# Patient Record
Sex: FEMALE | Race: WHITE | NOT HISPANIC OR LATINO | URBAN - METROPOLITAN AREA
[De-identification: names, ages, dates, MRNs, and addresses within clinical notes are randomized per-mention and may not be internally consistent; named-entity substitution may affect disease eponyms.]

---

## 2017-11-23 ENCOUNTER — EMERGENCY (EMERGENCY)
Facility: HOSPITAL | Age: 62
LOS: 0 days | Discharge: HOME | End: 2017-11-23
Attending: EMERGENCY MEDICINE | Admitting: EMERGENCY MEDICINE
Payer: COMMERCIAL

## 2017-11-23 VITALS
TEMPERATURE: 98 F | OXYGEN SATURATION: 100 % | HEART RATE: 60 BPM | DIASTOLIC BLOOD PRESSURE: 50 MMHG | RESPIRATION RATE: 16 BRPM | SYSTOLIC BLOOD PRESSURE: 99 MMHG

## 2017-11-23 VITALS — WEIGHT: 160.06 LBS | HEIGHT: 67 IN

## 2017-11-23 DIAGNOSIS — R10.31 RIGHT LOWER QUADRANT PAIN: ICD-10-CM

## 2017-11-23 DIAGNOSIS — M54.9 DORSALGIA, UNSPECIFIED: ICD-10-CM

## 2017-11-23 DIAGNOSIS — R11.2 NAUSEA WITH VOMITING, UNSPECIFIED: ICD-10-CM

## 2017-11-23 LAB
ALBUMIN SERPL ELPH-MCNC: 3 G/DL — LOW (ref 3.3–5)
ALP SERPL-CCNC: 208 U/L — HIGH (ref 40–120)
ALT FLD-CCNC: 47 U/L — SIGNIFICANT CHANGE UP (ref 12–78)
ANION GAP SERPL CALC-SCNC: 7 MMOL/L — SIGNIFICANT CHANGE UP (ref 5–17)
APPEARANCE UR: CLEAR — SIGNIFICANT CHANGE UP
APTT BLD: 32.8 SEC — SIGNIFICANT CHANGE UP (ref 27.5–37.4)
AST SERPL-CCNC: 74 U/L — HIGH (ref 15–37)
BACTERIA # UR AUTO: (no result)
BASOPHILS # BLD AUTO: 0.1 K/UL — SIGNIFICANT CHANGE UP (ref 0–0.2)
BASOPHILS NFR BLD AUTO: 1.2 % — SIGNIFICANT CHANGE UP (ref 0–2)
BILIRUB SERPL-MCNC: 1.7 MG/DL — HIGH (ref 0.2–1.2)
BILIRUB UR-MCNC: (no result)
BLD GP AB SCN SERPL QL: SIGNIFICANT CHANGE UP
BUN SERPL-MCNC: 14 MG/DL — SIGNIFICANT CHANGE UP (ref 7–23)
CALCIUM SERPL-MCNC: 8.5 MG/DL — SIGNIFICANT CHANGE UP (ref 8.5–10.1)
CHLORIDE SERPL-SCNC: 105 MMOL/L — SIGNIFICANT CHANGE UP (ref 96–108)
CO2 SERPL-SCNC: 27 MMOL/L — SIGNIFICANT CHANGE UP (ref 22–31)
COLOR SPEC: (no result)
COMMENT - URINE: SIGNIFICANT CHANGE UP
CREAT SERPL-MCNC: 0.8 MG/DL — SIGNIFICANT CHANGE UP (ref 0.5–1.3)
DIFF PNL FLD: (no result)
EOSINOPHIL # BLD AUTO: 0.2 K/UL — SIGNIFICANT CHANGE UP (ref 0–0.5)
EOSINOPHIL NFR BLD AUTO: 3.9 % — SIGNIFICANT CHANGE UP (ref 0–6)
EPI CELLS # UR: SIGNIFICANT CHANGE UP
GLUCOSE SERPL-MCNC: 79 MG/DL — SIGNIFICANT CHANGE UP (ref 70–99)
GLUCOSE UR QL: NEGATIVE MG/DL — SIGNIFICANT CHANGE UP
HCT VFR BLD CALC: 41.2 % — SIGNIFICANT CHANGE UP (ref 34.5–45)
HGB BLD-MCNC: 14.1 G/DL — SIGNIFICANT CHANGE UP (ref 11.5–15.5)
INR BLD: 1.2 RATIO — HIGH (ref 0.88–1.16)
KETONES UR-MCNC: (no result)
LACTATE SERPL-SCNC: 0.6 MMOL/L — LOW (ref 0.7–2)
LEUKOCYTE ESTERASE UR-ACNC: (no result)
LIDOCAIN IGE QN: 182 U/L — SIGNIFICANT CHANGE UP (ref 73–393)
LYMPHOCYTES # BLD AUTO: 1.4 K/UL — SIGNIFICANT CHANGE UP (ref 1–3.3)
LYMPHOCYTES # BLD AUTO: 23.3 % — SIGNIFICANT CHANGE UP (ref 13–44)
MCHC RBC-ENTMCNC: 34.2 GM/DL — SIGNIFICANT CHANGE UP (ref 32–36)
MCHC RBC-ENTMCNC: 34.8 PG — HIGH (ref 27–34)
MCV RBC AUTO: 101.8 FL — HIGH (ref 80–100)
MONOCYTES # BLD AUTO: 0.7 K/UL — SIGNIFICANT CHANGE UP (ref 0–0.9)
MONOCYTES NFR BLD AUTO: 11.9 % — SIGNIFICANT CHANGE UP (ref 2–14)
NEUTROPHILS # BLD AUTO: 3.5 K/UL — SIGNIFICANT CHANGE UP (ref 1.8–7.4)
NEUTROPHILS NFR BLD AUTO: 59.7 % — SIGNIFICANT CHANGE UP (ref 43–77)
NITRITE UR-MCNC: NEGATIVE — SIGNIFICANT CHANGE UP
PH UR: 7 — SIGNIFICANT CHANGE UP (ref 5–8)
PLATELET # BLD AUTO: 108 K/UL — LOW (ref 150–400)
POTASSIUM SERPL-MCNC: 3.7 MMOL/L — SIGNIFICANT CHANGE UP (ref 3.5–5.3)
POTASSIUM SERPL-SCNC: 3.7 MMOL/L — SIGNIFICANT CHANGE UP (ref 3.5–5.3)
PROT SERPL-MCNC: 7.3 GM/DL — SIGNIFICANT CHANGE UP (ref 6–8.3)
PROT UR-MCNC: 30 MG/DL
PROTHROM AB SERPL-ACNC: 13 SEC — HIGH (ref 9.8–12.7)
RBC # BLD: 4.05 M/UL — SIGNIFICANT CHANGE UP (ref 3.8–5.2)
RBC # FLD: 13.7 % — SIGNIFICANT CHANGE UP (ref 10.3–14.5)
RBC CASTS # UR COMP ASSIST: (no result) /HPF (ref 0–4)
SODIUM SERPL-SCNC: 139 MMOL/L — SIGNIFICANT CHANGE UP (ref 135–145)
SP GR SPEC: 1.01 — SIGNIFICANT CHANGE UP (ref 1.01–1.02)
TROPONIN I SERPL-MCNC: <0.015 NG/ML — SIGNIFICANT CHANGE UP (ref 0.01–0.04)
TYPE + AB SCN PNL BLD: SIGNIFICANT CHANGE UP
UROBILINOGEN FLD QL: 8 MG/DL
WBC # BLD: 5.8 K/UL — SIGNIFICANT CHANGE UP (ref 3.8–10.5)
WBC # FLD AUTO: 5.8 K/UL — SIGNIFICANT CHANGE UP (ref 3.8–10.5)
WBC UR QL: SIGNIFICANT CHANGE UP

## 2017-11-23 PROCEDURE — 76856 US EXAM PELVIC COMPLETE: CPT | Mod: 26

## 2017-11-23 PROCEDURE — 93010 ELECTROCARDIOGRAM REPORT: CPT

## 2017-11-23 PROCEDURE — 99284 EMERGENCY DEPT VISIT MOD MDM: CPT

## 2017-11-23 PROCEDURE — 74177 CT ABD & PELVIS W/CONTRAST: CPT | Mod: 26

## 2017-11-23 RX ORDER — MORPHINE SULFATE 50 MG/1
4 CAPSULE, EXTENDED RELEASE ORAL ONCE
Qty: 0 | Refills: 0 | Status: DISCONTINUED | OUTPATIENT
Start: 2017-11-23 | End: 2017-11-23

## 2017-11-23 RX ORDER — ONDANSETRON 8 MG/1
1 TABLET, FILM COATED ORAL
Qty: 28 | Refills: 0
Start: 2017-11-23 | End: 2017-11-30

## 2017-11-23 RX ORDER — ONDANSETRON 8 MG/1
4 TABLET, FILM COATED ORAL ONCE
Qty: 0 | Refills: 0 | Status: COMPLETED | OUTPATIENT
Start: 2017-11-23 | End: 2017-11-23

## 2017-11-23 RX ORDER — SODIUM CHLORIDE 9 MG/ML
1000 INJECTION INTRAMUSCULAR; INTRAVENOUS; SUBCUTANEOUS ONCE
Qty: 0 | Refills: 0 | Status: COMPLETED | OUTPATIENT
Start: 2017-11-23 | End: 2017-11-23

## 2017-11-23 RX ADMIN — ONDANSETRON 4 MILLIGRAM(S): 8 TABLET, FILM COATED ORAL at 17:35

## 2017-11-23 RX ADMIN — MORPHINE SULFATE 4 MILLIGRAM(S): 50 CAPSULE, EXTENDED RELEASE ORAL at 18:11

## 2017-11-23 RX ADMIN — MORPHINE SULFATE 4 MILLIGRAM(S): 50 CAPSULE, EXTENDED RELEASE ORAL at 17:35

## 2017-11-23 RX ADMIN — SODIUM CHLORIDE 2000 MILLILITER(S): 9 INJECTION INTRAMUSCULAR; INTRAVENOUS; SUBCUTANEOUS at 19:30

## 2017-11-23 NOTE — ED ADULT NURSE NOTE - OBJECTIVE STATEMENT
Pt reports RLQ pain x hours that started after eating hors d'oeuvres.  Pt reports nausea and dry heaving at home and one episode of small emesis en route.  Pt denies bloody emesis, dysuria, or change in bm.  Denies hematuria or bloody stool. Denies f/c.

## 2017-11-23 NOTE — ED PROVIDER NOTE - MEDICAL DECISION MAKING DETAILS
Labs WNL, with exception of LFTs likely 2/2 hx of cirrhosis.  CT abdomen WNL with exception of cirrhosis.  Pelvic US WNL.  Pt feeling better on reexam.  Okay for d/c home.  Rx Zofran.  F/u with PCP.  Return precautions given.

## 2017-11-23 NOTE — ED STATDOCS - PROGRESS NOTE DETAILS
Elvira SP: 61 y/o female with hx of HTN, HLD, inguinal and umbilical hernia presents to the ED c/o abd pain starting today. Pt has hernias, however doesn't usually experience pain. Pt felt pain after eating two shrimps for lunch. + NV +Back pain +Chills +lightheaded. Denies SP, SOB. Pt's surgeon chose not to operate on her hernias due to her cirrhosis. Dr. Elmore - surgeon. Will send pt to main ED for further evaluation.

## 2017-11-23 NOTE — ED PROVIDER NOTE - GASTROINTESTINAL, MLM
(+) periumbilical, suprapubic, RLQ tenderness to palpating, no rebound, no guarding.  Small reducible hernia.

## 2017-11-23 NOTE — ED ADULT NURSE REASSESSMENT NOTE - NS ED NURSE REASSESS COMMENT FT1
Pt A&O x4, recvd pt from RN Dottie in bed with rails up. PT hypotensive and asymptomatic,  Dr. Nathan made aware and IV fluids administered as ordered, cardiac monitor in place. safety maintained, will monitor.

## 2017-11-25 LAB
CULTURE RESULTS: SIGNIFICANT CHANGE UP
SPECIMEN SOURCE: SIGNIFICANT CHANGE UP

## 2018-06-04 ENCOUNTER — LABORATORY RESULT (OUTPATIENT)
Age: 63
End: 2018-06-04

## 2018-06-04 ENCOUNTER — APPOINTMENT (OUTPATIENT)
Dept: GASTROENTEROLOGY | Facility: CLINIC | Age: 63
End: 2018-06-04
Payer: COMMERCIAL

## 2018-06-04 VITALS
TEMPERATURE: 98.1 F | BODY MASS INDEX: 27.32 KG/M2 | WEIGHT: 170 LBS | OXYGEN SATURATION: 97 % | SYSTOLIC BLOOD PRESSURE: 110 MMHG | HEART RATE: 55 BPM | DIASTOLIC BLOOD PRESSURE: 70 MMHG | HEIGHT: 66 IN

## 2018-06-04 DIAGNOSIS — B18.2 CHRONIC VIRAL HEPATITIS C: ICD-10-CM

## 2018-06-04 DIAGNOSIS — R13.10 DYSPHAGIA, UNSPECIFIED: ICD-10-CM

## 2018-06-04 PROCEDURE — 99203 OFFICE O/P NEW LOW 30 MIN: CPT

## 2018-06-04 RX ORDER — PROPRANOLOL HYDROCHLORIDE 60 MG/1
60 TABLET ORAL
Refills: 0 | Status: ACTIVE | COMMUNITY

## 2018-06-06 ENCOUNTER — OTHER (OUTPATIENT)
Age: 63
End: 2018-06-06

## 2018-06-06 LAB
AFP-TM SERPL-MCNC: 4.5 NG/ML
ALBUMIN SERPL ELPH-MCNC: 3.4 G/DL
ALP BLD-CCNC: 131 U/L
ALT SERPL-CCNC: 25 U/L
ANION GAP SERPL CALC-SCNC: 10 MMOL/L
AST SERPL-CCNC: 51 U/L
BASOPHILS # BLD AUTO: 0.03 K/UL
BASOPHILS NFR BLD AUTO: 0.6 %
BILIRUB SERPL-MCNC: 1.9 MG/DL
BUN SERPL-MCNC: 10 MG/DL
CALCIUM SERPL-MCNC: 8.9 MG/DL
CHLORIDE SERPL-SCNC: 108 MMOL/L
CO2 SERPL-SCNC: 23 MMOL/L
CREAT SERPL-MCNC: 0.77 MG/DL
EOSINOPHIL # BLD AUTO: 0.23 K/UL
EOSINOPHIL NFR BLD AUTO: 4.8 %
GLUCOSE SERPL-MCNC: 85 MG/DL
HCT VFR BLD CALC: 39.7 %
HCV RNA SERPL NAA DL=5-ACNC: NOT DETECTED
HCV RNA SERPL NAA+PROBE-LOG IU: NOT DETECTED LOGIU/ML
HGB BLD-MCNC: 13.1 G/DL
IMM GRANULOCYTES NFR BLD AUTO: 0.2 %
INR PPP: 1.13 RATIO
LYMPHOCYTES # BLD AUTO: 1.28 K/UL
LYMPHOCYTES NFR BLD AUTO: 26.7 %
MAN DIFF?: NORMAL
MCHC RBC-ENTMCNC: 33 GM/DL
MCHC RBC-ENTMCNC: 34 PG
MCV RBC AUTO: 103.1 FL
MONOCYTES # BLD AUTO: 0.66 K/UL
MONOCYTES NFR BLD AUTO: 13.8 %
NEUTROPHILS # BLD AUTO: 2.59 K/UL
NEUTROPHILS NFR BLD AUTO: 53.9 %
PLATELET # BLD AUTO: 95 K/UL
POTASSIUM SERPL-SCNC: 4.4 MMOL/L
PROT SERPL-MCNC: 6.7 G/DL
PT BLD: 12.8 SEC
RBC # BLD: 3.85 M/UL
RBC # FLD: 15 %
SODIUM SERPL-SCNC: 141 MMOL/L
WBC # FLD AUTO: 4.8 K/UL

## 2018-07-11 ENCOUNTER — RX RENEWAL (OUTPATIENT)
Age: 63
End: 2018-07-11

## 2018-07-11 RX ORDER — POLYETHYLENE GLYCOL-3350 AND ELECTROLYTES 236; 6.74; 5.86; 2.97; 22.74 G/274.31G; G/274.31G; G/274.31G; G/274.31G; G/274.31G
236 POWDER, FOR SOLUTION ORAL
Qty: 4000 | Refills: 0 | Status: ACTIVE | COMMUNITY
Start: 2018-06-04 | End: 1900-01-01

## 2018-07-27 ENCOUNTER — OTHER (OUTPATIENT)
Age: 63
End: 2018-07-27

## 2018-07-27 DIAGNOSIS — R10.2 PELVIC AND PERINEAL PAIN: ICD-10-CM

## 2018-08-06 PROBLEM — K40.90 UNILATERAL INGUINAL HERNIA, WITHOUT OBSTRUCTION OR GANGRENE, NOT SPECIFIED AS RECURRENT: Chronic | Status: ACTIVE | Noted: 2017-11-23

## 2018-08-06 PROBLEM — E78.5 HYPERLIPIDEMIA, UNSPECIFIED: Chronic | Status: ACTIVE | Noted: 2017-11-23

## 2018-08-06 PROBLEM — K42.9 UMBILICAL HERNIA WITHOUT OBSTRUCTION OR GANGRENE: Chronic | Status: ACTIVE | Noted: 2017-11-23

## 2018-08-06 PROBLEM — I10 ESSENTIAL (PRIMARY) HYPERTENSION: Chronic | Status: ACTIVE | Noted: 2017-11-23

## 2018-08-20 ENCOUNTER — FORM ENCOUNTER (OUTPATIENT)
Age: 63
End: 2018-08-20

## 2018-08-20 ENCOUNTER — OTHER (OUTPATIENT)
Age: 63
End: 2018-08-20

## 2018-08-21 ENCOUNTER — APPOINTMENT (OUTPATIENT)
Dept: CT IMAGING | Facility: CLINIC | Age: 63
End: 2018-08-21
Payer: COMMERCIAL

## 2018-08-21 ENCOUNTER — OUTPATIENT (OUTPATIENT)
Dept: OUTPATIENT SERVICES | Facility: HOSPITAL | Age: 63
LOS: 1 days | End: 2018-08-21
Payer: COMMERCIAL

## 2018-08-21 DIAGNOSIS — Z00.8 ENCOUNTER FOR OTHER GENERAL EXAMINATION: ICD-10-CM

## 2018-08-21 PROCEDURE — 72193 CT PELVIS W/DYE: CPT | Mod: 26

## 2018-08-21 PROCEDURE — 82565 ASSAY OF CREATININE: CPT

## 2018-08-21 PROCEDURE — 72193 CT PELVIS W/DYE: CPT

## 2018-08-22 ENCOUNTER — OTHER (OUTPATIENT)
Age: 63
End: 2018-08-22

## 2018-08-29 ENCOUNTER — OTHER (OUTPATIENT)
Age: 63
End: 2018-08-29

## 2018-09-24 ENCOUNTER — OTHER (OUTPATIENT)
Age: 63
End: 2018-09-24

## 2018-09-24 DIAGNOSIS — B18.2 CHRONIC VIRAL HEPATITIS C: ICD-10-CM

## 2018-10-16 ENCOUNTER — OTHER (OUTPATIENT)
Age: 63
End: 2018-10-16

## 2018-10-16 ENCOUNTER — APPOINTMENT (OUTPATIENT)
Dept: GASTROENTEROLOGY | Facility: HOSPITAL | Age: 63
End: 2018-10-16

## 2018-10-29 ENCOUNTER — RESULT REVIEW (OUTPATIENT)
Age: 63
End: 2018-10-29

## 2018-12-03 ENCOUNTER — APPOINTMENT (OUTPATIENT)
Dept: GASTROENTEROLOGY | Facility: CLINIC | Age: 63
End: 2018-12-03
Payer: COMMERCIAL

## 2018-12-03 ENCOUNTER — OTHER (OUTPATIENT)
Age: 63
End: 2018-12-03

## 2018-12-03 VITALS
WEIGHT: 161 LBS | HEART RATE: 72 BPM | SYSTOLIC BLOOD PRESSURE: 134 MMHG | BODY MASS INDEX: 25.88 KG/M2 | DIASTOLIC BLOOD PRESSURE: 90 MMHG | HEIGHT: 66 IN | TEMPERATURE: 98.5 F | OXYGEN SATURATION: 98 %

## 2018-12-03 DIAGNOSIS — K42.9 UMBILICAL HERNIA W/OUT OBSTRUCTION OR GANGRENE: ICD-10-CM

## 2018-12-03 PROCEDURE — 99215 OFFICE O/P EST HI 40 MIN: CPT

## 2018-12-03 RX ORDER — FUROSEMIDE 20 MG/1
20 TABLET ORAL DAILY
Qty: 30 | Refills: 1 | Status: ACTIVE | COMMUNITY
Start: 2018-12-03 | End: 1900-01-01

## 2018-12-03 RX ORDER — SPIRONOLACTONE 25 MG/1
25 TABLET ORAL TWICE DAILY
Qty: 60 | Refills: 1 | Status: ACTIVE | COMMUNITY
Start: 2018-12-03 | End: 1900-01-01

## 2019-01-04 ENCOUNTER — APPOINTMENT (OUTPATIENT)
Dept: GASTROENTEROLOGY | Facility: CLINIC | Age: 64
End: 2019-01-04
Payer: COMMERCIAL

## 2019-01-04 DIAGNOSIS — Z86.19 PERSONAL HISTORY OF OTHER INFECTIOUS AND PARASITIC DISEASES: ICD-10-CM

## 2019-01-04 DIAGNOSIS — Z12.11 ENCOUNTER FOR SCREENING FOR MALIGNANT NEOPLASM OF COLON: ICD-10-CM

## 2019-01-04 DIAGNOSIS — K74.60 UNSPECIFIED CIRRHOSIS OF LIVER: ICD-10-CM

## 2019-01-04 DIAGNOSIS — K40.90 UNILATERAL INGUINAL HERNIA, W/OUT OBSTRUCTION OR GANGRENE, NOT SPECIFIED AS RECURRENT: ICD-10-CM

## 2019-01-04 DIAGNOSIS — K76.6 PORTAL HYPERTENSION: ICD-10-CM

## 2019-01-04 DIAGNOSIS — Z86.010 PERSONAL HISTORY OF COLONIC POLYPS: ICD-10-CM

## 2019-01-04 DIAGNOSIS — R60.0 LOCALIZED EDEMA: ICD-10-CM

## 2019-01-04 PROCEDURE — 99215 OFFICE O/P EST HI 40 MIN: CPT

## 2019-01-04 RX ORDER — POLYETHYLENE GLYCOL-3350 AND ELECTROLYTES 236; 6.74; 5.86; 2.97; 22.74 G/274.31G; G/274.31G; G/274.31G; G/274.31G; G/274.31G
236 POWDER, FOR SOLUTION ORAL
Qty: 1 | Refills: 0 | Status: ACTIVE | COMMUNITY
Start: 2019-01-04 | End: 1900-01-01

## 2019-01-04 NOTE — REASON FOR VISIT
[FreeTextEntry1] : History of chronic hepatitis C that was treated and resolved, cirrhosis of the liver, portal hypertension, small esophageal varices, improving bilateral leg edema, request for upper endoscopy for evaluation of portal hypertension and esophageal varices and colonoscopy for colon cancer screening and possible history of colon polyp

## 2019-01-04 NOTE — REVIEW OF SYSTEMS
[Feeling Poorly] : feeling poorly [Feeling Tired] : feeling tired [As Noted in HPI] : as noted in HPI [Negative] : Heme/Lymph

## 2019-01-04 NOTE — ASSESSMENT
[FreeTextEntry1] : Impression\par \par History of chronic hepatitis C treated in remission\par \par Cirrhosis of the liver with portal hypertension\par \par Prior history of small esophageal varices without bleeding and doing well on propanolol\par \par Bilateral mild ankle edema improved with Aldactone for a somewhat\par \par Possible history of colon polyp\par \par Request for colon cancer screening and repeat upper endoscopy to reevaluate esophageal varices and portal hypertension although pathology\par \par Suggest\par \par Continue furosemide 20 mg a day together with Aldactone 25 mg p.o. b.i.d.\par \par Blood work here today to look electrolytes, CBC, platelet count, PT INR\par \par Low-sodium diet\par \par No alcohol or hepatotoxic drugs\par \par Follow up at [facility] for routine annual evaluation for possible liver transplant\par \par Upper endoscopy to reevaluate esophageal varices and other pathology\par \par Colonoscopy to look for polyps or other pathology\par \par Both upper endoscopy and colonoscopy to be done in a hospital setting\par \par Gavlyte\par \par Risks/benefits:\par The procedure, the risks and benefits and alternatives have been reviewed in great detail with the patient.  Risks including, but not limited to sedation such as cardiac and pulmonary compromise, the procedure itself such as bleeding requiring hospitalization, transfusion, surgery, temporary or permanent colostomy.  Perforation or puncture of the requiring hospitalization, surgery, temporary colostomy.\par It has been explained to the patient that though colonoscopy is thought to be the best screening exam for colon cancer and polyps, no screening exam can find all colon polyps or cancers.  \par The patient expresses understanding of the procedure and consents to undergoing the procedure.\par \par

## 2019-01-04 NOTE — HISTORY OF PRESENT ILLNESS
[de-identified] : Dr. Luna Takes care of this very pleasant 63-year-old female is here with her . She was treated and did well with viral hepatitis C number of years ago. Hepatitis C RNA has been undetectable. Unfortunately she developed cirrhosis of the liver. She is followed at [facility] program. She has a follow up this spring. No evidence of hepatoma on scans. She has portal hypertension based on CAT scan a previous upper endoscopy. She's had some mild bilateral leg edema this responded to furosemide 40 mg a day and Aldactone 25 mg p.o. b.i.d. Is no hematemesis or coffee-ground emesis. Weight is stable. Appetite is good. No change in bowel movements. No blood per rectum. Possible history of colon polyps in the past and she is due for routine screening colonoscopy.

## 2019-01-04 NOTE — CONSULT LETTER
[Dear  ___] : Dear  [unfilled], [Consult Letter:] : I had the pleasure of evaluating your patient, [unfilled]. [Please see my note below.] : Please see my note below. [Consult Closing:] : Thank you very much for allowing me to participate in the care of this patient.  If you have any questions, please do not hesitate to contact me. [Sincerely,] : Sincerely, [FreeTextEntry2] : Brook Luna MD\par [address] [facility]\[place] [address]\par  [FreeTextEntry3] : Octavio Otto MD\par

## 2019-01-04 NOTE — PHYSICAL EXAM
[General Appearance - Alert] : alert [General Appearance - In No Acute Distress] : in no acute distress [General Appearance - Well Nourished] : well nourished [General Appearance - Well Developed] : well developed [General Appearance - Well-Appearing] : healthy appearing [Sclera] : the sclera and conjunctiva were normal [Neck Appearance] : the appearance of the neck was normal [Neck Cervical Mass (___cm)] : no neck mass was observed [Jugular Venous Distention Increased] : there was no jugular-venous distention [Auscultation Breath Sounds / Voice Sounds] : lungs were clear to auscultation bilaterally [Heart Rate And Rhythm] : heart rate was normal and rhythm regular [Heart Sounds] : normal S1 and S2 [Heart Sounds Gallop] : no gallops [Murmurs] : no murmurs [Heart Sounds Pericardial Friction Rub] : no pericardial rub [Full Pulse] : the pedal pulses are present [Edema] : there was no peripheral edema [Bowel Sounds] : normal bowel sounds [Abdomen Soft] : soft [Abdomen Tenderness] : non-tender [Abdomen Mass (___ Cm)] : no abdominal mass palpated [Cervical Lymph Nodes Enlarged Posterior Bilaterally] : posterior cervical [Cervical Lymph Nodes Enlarged Anterior Bilaterally] : anterior cervical [Supraclavicular Lymph Nodes Enlarged Bilaterally] : supraclavicular [Axillary Lymph Nodes Enlarged Bilaterally] : axillary [Femoral Lymph Nodes Enlarged Bilaterally] : femoral [Inguinal Lymph Nodes Enlarged Bilaterally] : inguinal [Skin Color & Pigmentation] : normal skin color and pigmentation [Skin Turgor] : normal skin turgor [] : no rash [Deep Tendon Reflexes (DTR)] : deep tendon reflexes were 2+ and symmetric [Sensation] : the sensory exam was normal to light touch and pinprick [No Focal Deficits] : no focal deficits [Oriented To Time, Place, And Person] : oriented to person, place, and time [Impaired Insight] : insight and judgment were intact [Affect] : the affect was normal [FreeTextEntry1] : Improve bilateral ankle edema, 1+ pitting at the

## 2019-01-05 ENCOUNTER — OTHER (OUTPATIENT)
Age: 64
End: 2019-01-05

## 2019-01-05 LAB
ALBUMIN SERPL ELPH-MCNC: 3.5 G/DL
ALP BLD-CCNC: 150 U/L
ALT SERPL-CCNC: 42 U/L
ANION GAP SERPL CALC-SCNC: 11 MMOL/L
AST SERPL-CCNC: 74 U/L
BASOPHILS # BLD AUTO: 0.01 K/UL
BASOPHILS NFR BLD AUTO: 0.3 %
BILIRUB SERPL-MCNC: 1.6 MG/DL
BUN SERPL-MCNC: 12 MG/DL
CALCIUM SERPL-MCNC: 9.2 MG/DL
CHLORIDE SERPL-SCNC: 106 MMOL/L
CO2 SERPL-SCNC: 25 MMOL/L
CREAT SERPL-MCNC: 0.48 MG/DL
EOSINOPHIL # BLD AUTO: 0.16 K/UL
EOSINOPHIL NFR BLD AUTO: 4.3 %
GLUCOSE SERPL-MCNC: 89 MG/DL
HCT VFR BLD CALC: 41.2 %
HGB BLD-MCNC: 13.5 G/DL
IMM GRANULOCYTES NFR BLD AUTO: 0 %
INR PPP: 1.16 RATIO
LYMPHOCYTES # BLD AUTO: 0.89 K/UL
LYMPHOCYTES NFR BLD AUTO: 23.9 %
MAN DIFF?: NORMAL
MCHC RBC-ENTMCNC: 32.8 GM/DL
MCHC RBC-ENTMCNC: 33.6 PG
MCV RBC AUTO: 102.5 FL
MONOCYTES # BLD AUTO: 0.38 K/UL
MONOCYTES NFR BLD AUTO: 10.2 %
NEUTROPHILS # BLD AUTO: 2.29 K/UL
NEUTROPHILS NFR BLD AUTO: 61.3 %
PLATELET # BLD AUTO: 87 K/UL
POTASSIUM SERPL-SCNC: 4.3 MMOL/L
PROT SERPL-MCNC: 7 G/DL
PT BLD: 13.1 SEC
RBC # BLD: 4.02 M/UL
RBC # FLD: 15.5 %
SODIUM SERPL-SCNC: 142 MMOL/L
WBC # FLD AUTO: 3.73 K/UL

## 2019-02-18 ENCOUNTER — TRANSCRIPTION ENCOUNTER (OUTPATIENT)
Age: 64
End: 2019-02-18

## 2019-02-18 ENCOUNTER — OTHER (OUTPATIENT)
Age: 64
End: 2019-02-18

## 2019-02-19 ENCOUNTER — OUTPATIENT (OUTPATIENT)
Dept: OUTPATIENT SERVICES | Facility: HOSPITAL | Age: 64
LOS: 1 days | End: 2019-02-19
Payer: COMMERCIAL

## 2019-02-19 ENCOUNTER — APPOINTMENT (OUTPATIENT)
Dept: GASTROENTEROLOGY | Facility: HOSPITAL | Age: 64
End: 2019-02-19
Payer: COMMERCIAL

## 2019-02-19 ENCOUNTER — RESULT REVIEW (OUTPATIENT)
Age: 64
End: 2019-02-19

## 2019-02-19 DIAGNOSIS — K29.70 GASTRITIS, UNSPECIFIED, W/OUT BLEEDING: ICD-10-CM

## 2019-02-19 DIAGNOSIS — R13.10 DYSPHAGIA, UNSPECIFIED: ICD-10-CM

## 2019-02-19 DIAGNOSIS — Z86.010 PERSONAL HISTORY OF COLONIC POLYPS: ICD-10-CM

## 2019-02-19 DIAGNOSIS — Z12.11 ENCOUNTER FOR SCREENING FOR MALIGNANT NEOPLASM OF COLON: ICD-10-CM

## 2019-02-19 DIAGNOSIS — I85.00 ESOPHAGEAL VARICES W/OUT BLEEDING: ICD-10-CM

## 2019-02-19 PROCEDURE — 43235 EGD DIAGNOSTIC BRUSH WASH: CPT

## 2019-02-19 PROCEDURE — 45380 COLONOSCOPY AND BIOPSY: CPT

## 2019-02-19 PROCEDURE — 43239 EGD BIOPSY SINGLE/MULTIPLE: CPT

## 2019-02-19 PROCEDURE — 88305 TISSUE EXAM BY PATHOLOGIST: CPT | Mod: 26

## 2019-02-19 PROCEDURE — 88305 TISSUE EXAM BY PATHOLOGIST: CPT

## 2019-02-19 PROCEDURE — 45380 COLONOSCOPY AND BIOPSY: CPT | Mod: 74,PT

## 2019-02-19 RX ORDER — RANITIDINE 300 MG/1
300 TABLET ORAL
Qty: 90 | Refills: 0 | Status: ACTIVE | COMMUNITY
Start: 2019-02-19 | End: 1900-01-01

## 2019-02-21 ENCOUNTER — OTHER (OUTPATIENT)
Age: 64
End: 2019-02-21

## 2019-02-21 LAB — SURGICAL PATHOLOGY STUDY: SIGNIFICANT CHANGE UP

## 2020-01-21 ENCOUNTER — INPATIENT (INPATIENT)
Facility: HOSPITAL | Age: 65
LOS: 1 days | Discharge: HOME | DRG: 378 | End: 2020-01-23
Attending: INTERNAL MEDICINE | Admitting: INTERNAL MEDICINE
Payer: COMMERCIAL

## 2020-01-21 ENCOUNTER — TRANSCRIPTION ENCOUNTER (OUTPATIENT)
Age: 65
End: 2020-01-21

## 2020-01-21 VITALS
SYSTOLIC BLOOD PRESSURE: 130 MMHG | WEIGHT: 160.06 LBS | RESPIRATION RATE: 18 BRPM | OXYGEN SATURATION: 96 % | HEART RATE: 85 BPM | DIASTOLIC BLOOD PRESSURE: 81 MMHG | TEMPERATURE: 98 F

## 2020-01-21 DIAGNOSIS — K40.90 UNILATERAL INGUINAL HERNIA, WITHOUT OBSTRUCTION OR GANGRENE, NOT SPECIFIED AS RECURRENT: ICD-10-CM

## 2020-01-21 DIAGNOSIS — K92.0 HEMATEMESIS: ICD-10-CM

## 2020-01-21 DIAGNOSIS — K42.9 UMBILICAL HERNIA WITHOUT OBSTRUCTION OR GANGRENE: ICD-10-CM

## 2020-01-21 DIAGNOSIS — Z29.9 ENCOUNTER FOR PROPHYLACTIC MEASURES, UNSPECIFIED: ICD-10-CM

## 2020-01-21 DIAGNOSIS — I10 ESSENTIAL (PRIMARY) HYPERTENSION: ICD-10-CM

## 2020-01-21 DIAGNOSIS — R94.31 ABNORMAL ELECTROCARDIOGRAM [ECG] [EKG]: ICD-10-CM

## 2020-01-21 DIAGNOSIS — K74.60 UNSPECIFIED CIRRHOSIS OF LIVER: ICD-10-CM

## 2020-01-21 DIAGNOSIS — D69.6 THROMBOCYTOPENIA, UNSPECIFIED: ICD-10-CM

## 2020-01-21 DIAGNOSIS — K92.2 GASTROINTESTINAL HEMORRHAGE, UNSPECIFIED: ICD-10-CM

## 2020-01-21 DIAGNOSIS — E78.5 HYPERLIPIDEMIA, UNSPECIFIED: ICD-10-CM

## 2020-01-21 LAB
ALBUMIN SERPL ELPH-MCNC: 2.6 G/DL — LOW (ref 3.3–5)
ALP SERPL-CCNC: 161 U/L — HIGH (ref 40–120)
ALT FLD-CCNC: 36 U/L — SIGNIFICANT CHANGE UP (ref 12–78)
ANION GAP SERPL CALC-SCNC: 8 MMOL/L — SIGNIFICANT CHANGE UP (ref 5–17)
APPEARANCE UR: CLEAR — SIGNIFICANT CHANGE UP
APTT BLD: 30.2 SEC — SIGNIFICANT CHANGE UP (ref 28.5–37)
AST SERPL-CCNC: 59 U/L — HIGH (ref 15–37)
BASOPHILS # BLD AUTO: 0 K/UL — SIGNIFICANT CHANGE UP (ref 0–0.2)
BASOPHILS NFR BLD AUTO: 0 % — SIGNIFICANT CHANGE UP (ref 0–2)
BILIRUB SERPL-MCNC: 4.1 MG/DL — HIGH (ref 0.2–1.2)
BILIRUB UR-MCNC: ABNORMAL
BUN SERPL-MCNC: 21 MG/DL — SIGNIFICANT CHANGE UP (ref 7–23)
CALCIUM SERPL-MCNC: 8.2 MG/DL — LOW (ref 8.5–10.1)
CHLORIDE SERPL-SCNC: 104 MMOL/L — SIGNIFICANT CHANGE UP (ref 96–108)
CO2 SERPL-SCNC: 26 MMOL/L — SIGNIFICANT CHANGE UP (ref 22–31)
COLOR SPEC: ABNORMAL
CREAT SERPL-MCNC: 0.9 MG/DL — SIGNIFICANT CHANGE UP (ref 0.5–1.3)
DIFF PNL FLD: ABNORMAL
EOSINOPHIL # BLD AUTO: 0.1 K/UL — SIGNIFICANT CHANGE UP (ref 0–0.5)
EOSINOPHIL NFR BLD AUTO: 1 % — SIGNIFICANT CHANGE UP (ref 0–6)
GLUCOSE SERPL-MCNC: 112 MG/DL — HIGH (ref 70–99)
GLUCOSE UR QL: NEGATIVE — SIGNIFICANT CHANGE UP
HCT VFR BLD CALC: 42.5 % — SIGNIFICANT CHANGE UP (ref 34.5–45)
HGB BLD-MCNC: 15.1 G/DL — SIGNIFICANT CHANGE UP (ref 11.5–15.5)
INR BLD: 1.44 RATIO — HIGH (ref 0.88–1.16)
KETONES UR-MCNC: ABNORMAL
LEUKOCYTE ESTERASE UR-ACNC: NEGATIVE — SIGNIFICANT CHANGE UP
LIDOCAIN IGE QN: 142 U/L — SIGNIFICANT CHANGE UP (ref 73–393)
LYMPHOCYTES # BLD AUTO: 0.7 K/UL — LOW (ref 1–3.3)
LYMPHOCYTES # BLD AUTO: 7 % — LOW (ref 13–44)
MCHC RBC-ENTMCNC: 35.5 GM/DL — SIGNIFICANT CHANGE UP (ref 32–36)
MCHC RBC-ENTMCNC: 35.8 PG — HIGH (ref 27–34)
MCV RBC AUTO: 100.7 FL — HIGH (ref 80–100)
MONOCYTES # BLD AUTO: 0.9 K/UL — SIGNIFICANT CHANGE UP (ref 0–0.9)
MONOCYTES NFR BLD AUTO: 9 % — SIGNIFICANT CHANGE UP (ref 2–14)
NEUTROPHILS # BLD AUTO: 7.86 K/UL — HIGH (ref 1.8–7.4)
NEUTROPHILS NFR BLD AUTO: 72 % — SIGNIFICANT CHANGE UP (ref 43–77)
NITRITE UR-MCNC: NEGATIVE — SIGNIFICANT CHANGE UP
NRBC # BLD: SIGNIFICANT CHANGE UP /100 WBCS (ref 0–0)
OB PNL STL: NEGATIVE — SIGNIFICANT CHANGE UP
PH UR: 5 — SIGNIFICANT CHANGE UP (ref 5–8)
PLATELET # BLD AUTO: 99 K/UL — LOW (ref 150–400)
POTASSIUM SERPL-MCNC: 3.5 MMOL/L — SIGNIFICANT CHANGE UP (ref 3.5–5.3)
POTASSIUM SERPL-SCNC: 3.5 MMOL/L — SIGNIFICANT CHANGE UP (ref 3.5–5.3)
PROT SERPL-MCNC: 7.1 G/DL — SIGNIFICANT CHANGE UP (ref 6–8.3)
PROT UR-MCNC: 30 MG/DL
PROTHROM AB SERPL-ACNC: 16.5 SEC — HIGH (ref 10–12.9)
RBC # BLD: 4.22 M/UL — SIGNIFICANT CHANGE UP (ref 3.8–5.2)
RBC # FLD: 15.1 % — HIGH (ref 10.3–14.5)
SODIUM SERPL-SCNC: 138 MMOL/L — SIGNIFICANT CHANGE UP (ref 135–145)
SP GR SPEC: 1.02 — SIGNIFICANT CHANGE UP (ref 1.01–1.02)
UROBILINOGEN FLD QL: 8
WBC # BLD: 9.95 K/UL — SIGNIFICANT CHANGE UP (ref 3.8–10.5)
WBC # FLD AUTO: 9.95 K/UL — SIGNIFICANT CHANGE UP (ref 3.8–10.5)

## 2020-01-21 PROCEDURE — 99285 EMERGENCY DEPT VISIT HI MDM: CPT

## 2020-01-21 PROCEDURE — 99223 1ST HOSP IP/OBS HIGH 75: CPT

## 2020-01-21 PROCEDURE — 74177 CT ABD & PELVIS W/CONTRAST: CPT | Mod: 26

## 2020-01-21 PROCEDURE — 93010 ELECTROCARDIOGRAM REPORT: CPT

## 2020-01-21 PROCEDURE — 99223 1ST HOSP IP/OBS HIGH 75: CPT | Mod: GC

## 2020-01-21 RX ORDER — NICOTINE POLACRILEX 2 MG
1 GUM BUCCAL DAILY
Refills: 0 | Status: DISCONTINUED | OUTPATIENT
Start: 2020-01-21 | End: 2020-01-23

## 2020-01-21 RX ORDER — MORPHINE SULFATE 50 MG/1
2 CAPSULE, EXTENDED RELEASE ORAL ONCE
Refills: 0 | Status: DISCONTINUED | OUTPATIENT
Start: 2020-01-21 | End: 2020-01-21

## 2020-01-21 RX ORDER — ONDANSETRON 8 MG/1
4 TABLET, FILM COATED ORAL ONCE
Refills: 0 | Status: COMPLETED | OUTPATIENT
Start: 2020-01-21 | End: 2020-01-21

## 2020-01-21 RX ORDER — MORPHINE SULFATE 50 MG/1
4 CAPSULE, EXTENDED RELEASE ORAL ONCE
Refills: 0 | Status: DISCONTINUED | OUTPATIENT
Start: 2020-01-21 | End: 2020-01-21

## 2020-01-21 RX ORDER — TRAMADOL HYDROCHLORIDE 50 MG/1
75 TABLET ORAL EVERY 6 HOURS
Refills: 0 | Status: DISCONTINUED | OUTPATIENT
Start: 2020-01-21 | End: 2020-01-23

## 2020-01-21 RX ORDER — IOHEXOL 300 MG/ML
30 INJECTION, SOLUTION INTRAVENOUS ONCE
Refills: 0 | Status: COMPLETED | OUTPATIENT
Start: 2020-01-21 | End: 2020-01-21

## 2020-01-21 RX ORDER — PANTOPRAZOLE SODIUM 20 MG/1
40 TABLET, DELAYED RELEASE ORAL
Refills: 0 | Status: DISCONTINUED | OUTPATIENT
Start: 2020-01-21 | End: 2020-01-22

## 2020-01-21 RX ORDER — PANTOPRAZOLE SODIUM 20 MG/1
40 TABLET, DELAYED RELEASE ORAL ONCE
Refills: 0 | Status: COMPLETED | OUTPATIENT
Start: 2020-01-21 | End: 2020-01-21

## 2020-01-21 RX ORDER — ZOLPIDEM TARTRATE 10 MG/1
5 TABLET ORAL AT BEDTIME
Refills: 0 | Status: DISCONTINUED | OUTPATIENT
Start: 2020-01-21 | End: 2020-01-23

## 2020-01-21 RX ORDER — SODIUM CHLORIDE 9 MG/ML
1000 INJECTION INTRAMUSCULAR; INTRAVENOUS; SUBCUTANEOUS ONCE
Refills: 0 | Status: COMPLETED | OUTPATIENT
Start: 2020-01-21 | End: 2020-01-21

## 2020-01-21 RX ORDER — TRAMADOL HYDROCHLORIDE 50 MG/1
50 TABLET ORAL EVERY 6 HOURS
Refills: 0 | Status: DISCONTINUED | OUTPATIENT
Start: 2020-01-21 | End: 2020-01-23

## 2020-01-21 RX ORDER — FUROSEMIDE 40 MG
40 TABLET ORAL ONCE
Refills: 0 | Status: DISCONTINUED | OUTPATIENT
Start: 2020-01-21 | End: 2020-01-21

## 2020-01-21 RX ORDER — PROPRANOLOL HCL 160 MG
60 CAPSULE, EXTENDED RELEASE 24HR ORAL DAILY
Refills: 0 | Status: DISCONTINUED | OUTPATIENT
Start: 2020-01-21 | End: 2020-01-23

## 2020-01-21 RX ADMIN — SODIUM CHLORIDE 1000 MILLILITER(S): 9 INJECTION INTRAMUSCULAR; INTRAVENOUS; SUBCUTANEOUS at 11:50

## 2020-01-21 RX ADMIN — MORPHINE SULFATE 4 MILLIGRAM(S): 50 CAPSULE, EXTENDED RELEASE ORAL at 11:49

## 2020-01-21 RX ADMIN — MORPHINE SULFATE 2 MILLIGRAM(S): 50 CAPSULE, EXTENDED RELEASE ORAL at 16:10

## 2020-01-21 RX ADMIN — MORPHINE SULFATE 4 MILLIGRAM(S): 50 CAPSULE, EXTENDED RELEASE ORAL at 12:15

## 2020-01-21 RX ADMIN — ONDANSETRON 4 MILLIGRAM(S): 8 TABLET, FILM COATED ORAL at 11:49

## 2020-01-21 RX ADMIN — Medication 100 MILLIGRAM(S): at 21:35

## 2020-01-21 RX ADMIN — PANTOPRAZOLE SODIUM 40 MILLIGRAM(S): 20 TABLET, DELAYED RELEASE ORAL at 13:36

## 2020-01-21 RX ADMIN — IOHEXOL 30 MILLILITER(S): 300 INJECTION, SOLUTION INTRAVENOUS at 11:50

## 2020-01-21 RX ADMIN — MORPHINE SULFATE 2 MILLIGRAM(S): 50 CAPSULE, EXTENDED RELEASE ORAL at 15:19

## 2020-01-21 NOTE — H&P ADULT - PROBLEM SELECTOR PLAN 4
chronic, stable  - seen on CT scan, continue to monitor, is not causing significant distress at this time

## 2020-01-21 NOTE — CONSULT NOTE ADULT - SUBJECTIVE AND OBJECTIVE BOX
HPI:  CHARTING IN PROGRESS  64 year old female with PMHx cirrhosis presenting to [facility] ED on 20 with coffee ground emesis.     In the Ed, VS: T 97.9, HR 85, /81, RR 18, SpO2 96 on RA.  Labs significant for WBC 9.96, Hgb 15.5, Hct 42.5, Plt 99, BMP wnl, Tbili 4.1, Alp 161, AST 59, ALT 36. Imaging:  CT abd/pelvis: Cirrhosis with splenomegaly ascites and varices as described. Mild diffuse small and large bowel thickening likely a portal enterocolopathy.  Diverticulosis coli, no diverticulitis. Periumbilical hernia containing venous collaterals. Right inguinal hernia containing a knuckle of nonobstructive small bowel. (2020 15:03)      PAST MEDICAL & SURGICAL HISTORY:  Cirrhosis  HLD (hyperlipidemia)  HTN (hypertension)  Umbilical hernia  Inguinal hernia  No significant past surgical history    Home Medications:  furosemide 20 mg oral tablet:  (2020 15:06)    Allergies:    No Known Allergies    SOCIAL HISTORY:  Denies ETOH    FAMILY HISTORY:  Non contributory      Vital Signs Last 24 Hrs  T(C): 37.1 (2020 15:20), Max: 37.2 (2020 11:53)  T(F): 98.7 (2020 15:20), Max: 98.9 (2020 11:53)  HR: 88 (2020 15:20) (83 - 88)  BP: 134/78 (2020 15:20) (118/76 - 134/78)  BP(mean): --  RR: 16 (2020 15:20) (16 - 18)  SpO2: 98% (2020 15:20) (96% - 98%)    LABS:                        15.1   9.95  )-----------( 99       ( 2020 11:41 )             42.5     -    138  |  104  |  21  ----------------------------<  112<H>  3.5   |  26  |  0.90    Ca    8.2<L>      2020 11:41    TPro  7.1  /  Alb  2.6<L>  /  TBili  4.1<H>  /  DBili  x   /  AST  59<H>  /  ALT  36  /  AlkPhos  161<H>        Urinalysis Basic - ( 2020 11:41 )    Color: Milad[place] / Appearance: Clear / S.020 / pH: x  Gluc: x / Ketone: Trace  / Bili: Moderate / Urobili: 8   Blood: x / Protein: 30 mg/dL / Nitrite: Negative   Leuk Esterase: Negative / RBC: 0-2 /HPF / WBC Negative   Sq Epi: x / Non Sq Epi: Moderate / Bacteria: Many        RADIOLOGY & ADDITIONAL STUDIES:  EXAM:  CT ABDOMEN AND PELVIS OC IC                            PROCEDURE DATE:  2020          INTERPRETATION:  CLINICAL INFORMATION: Epigastric pain    COMPARISON: 2017    PROCEDURE:   CT of the Abdomen and Pelvis was performed with intravenous contrast.   Intravenous contrast: 90 ml Omnipaque 350. 10 ml discarded.  Oral contrast: positive contrast was administered.  Sagittal and coronal reformats were performed.    FINDINGS:    LOWER CHEST: Moderate hiatal hernia. Prominent periesophageal varices.    LIVER: Advanced cirrhotic hepatic morphology similar to prior. Recommend MRI to exclude occult hepatoma  BILE DUCTS: Normal caliber.  GALLBLADDER: Within normal limits.  SPLEEN: Moderately enlarged  PANCREAS: Within normal limits.  ADRENALS: Within normal limits.  KIDNEYS/URETERS: Within normal limits.    BLADDER: Within normal limits.  REPRODUCTIVE ORGANS: No gynecologic mass    BOWEL: No bowel obstruction. Diffuse large and small bowel bowel thickening, nonspecific in this setting likely related reflects a portal enterocolopathy. Appendix no appendicitis  PERITONEUM: Small ascites  VESSELS: Nonaneurysmal.  RETROPERITONEUM/LYMPH NODES: No lymphadenopathy.    ABDOMINAL WALL: Venous collaterals. Periumbilical hernia containing venous collaterals. Right inguinal hernia containing a knuckle of nonobstructive small bowel.  BONES: Stable.    IMPRESSION:     Cirrhosis with splenomegaly ascites and varices as described. Mild diffuse small and large bowel thickening likely a portal enterocolopathy  Diverticulosis coli, no diverticulitis.  Additional findings as discussed                    SAMANTHA WHITMAN M.D., ATTENDING RADIOLOGIST  This document has been electronically signed. 2020  2:28PM

## 2020-01-21 NOTE — H&P ADULT - PROBLEM SELECTOR PLAN 6
chronic, stable  - continue propanolol ER 60mg daily chronic, stable  - not on any statin at home, at this time. Thrombocytopenia, 99 on admission  - continue to monitor  - not on any antiplatelets, SCDS ordered.

## 2020-01-21 NOTE — H&P ADULT - NSHPPHYSICALEXAM_GEN_ALL_CORE
T(C): 37.1 (01-21-20 @ 15:20), Max: 37.2 (01-21-20 @ 11:53)  HR: 88 (01-21-20 @ 15:20) (83 - 88)  BP: 134/78 (01-21-20 @ 15:20) (118/76 - 134/78)  RR: 16 (01-21-20 @ 15:20) (16 - 18)  SpO2: 98% (01-21-20 @ 15:20) (96% - 98%)    GENERAL: patient appears well, no acute distress, appropriate, pleasant  EYES: sclera clear, no exudates, nonicteric   ENMT: oropharynx clear without erythema, no exudates, dry mucous membranes  NECK: supple, soft  LUNGS: good air entry bilaterally, clear to auscultation, symmetric breath sounds, no wheezing or rhonchi appreciated  HEART: soft S1/S2, regular rate and rhythm, no murmurs noted, no lower extremity edema  GASTROINTESTINAL: abdomen is soft, tender to palpation suprapubic, distended, umbilical hernia visible, inguinal hernia palpated very tender to palpation.  INTEGUMENT: good skin turgor, no lesions noted, mildly jaundiced skin  MUSCULOSKELETAL: no clubbing or cyanosis, no obvious deformity  NEUROLOGIC: awake, alert, oriented x3, good muscle tone in 4 extremities, no obvious sensory deficits  HEME/LYMPH: no palpable supraclavicular nodules, no obvious ecchymosis or petechiae T(C): 37.1 (01-21-20 @ 15:20), Max: 37.2 (01-21-20 @ 11:53)  HR: 88 (01-21-20 @ 15:20) (83 - 88)  BP: 134/78 (01-21-20 @ 15:20) (118/76 - 134/78)  RR: 16 (01-21-20 @ 15:20) (16 - 18)  SpO2: 98% (01-21-20 @ 15:20) (96% - 98%)    GENERAL: patient appears well, no acute distress, appropriate, pleasant  EYES: sclera clear, no exudates, nonicteric   ENMT: oropharynx clear without erythema, no exudates, dry mucous membranes  NECK: supple, soft  LUNGS: good air entry bilaterally, clear to auscultation, symmetric breath sounds, no wheezing or rhonchi appreciated  HEART: soft S1/S2, regular rate and rhythm, no murmurs noted, LE non pitting edema b/l  GASTROINTESTINAL: abdomen is soft, tender to palpation suprapubic, distended, umbilical hernia visible, inguinal hernia palpated very tender to palpation.  INTEGUMENT: good skin turgor, no lesions noted, mildly jaundiced skin  MUSCULOSKELETAL: no clubbing or cyanosis, no obvious deformity  NEUROLOGIC: awake, alert, oriented x3, good muscle tone in 4 extremities, no obvious sensory deficits  HEME/LYMPH: no palpable supraclavicular nodules, no obvious ecchymosis or petechiae

## 2020-01-21 NOTE — H&P ADULT - PROBLEM SELECTOR PLAN 7
DVT ppx:  will treat with mechanical DVT ppx - SCDs, patient is actively bleeding at this time. chronic, stable  - continue propanolol ER 60mg daily chronic, stable  - not on any statin at home, at this time.

## 2020-01-21 NOTE — H&P ADULT - NSHPSOCIALHISTORY_GEN_ALL_CORE
Lives at home with .  Currently uses 7mg nicotine patch.  Last cigarette 1 week ago.  Roughly 25ppy history smoking.  Denies any IVDA, previous drug use.  Denies any etoh use since diagnosis of cirrhosis.

## 2020-01-21 NOTE — H&P ADULT - NSICDXPASTMEDICALHX_GEN_ALL_CORE_FT
PAST MEDICAL HISTORY:  Cirrhosis     HLD (hyperlipidemia)     HTN (hypertension)     Inguinal hernia     Umbilical hernia

## 2020-01-21 NOTE — H&P ADULT - PROBLEM SELECTOR PLAN 5
chronic, stable  - not on any statin at home, at this time. Thrombocytopenia, 99 on admission  - continue to monitor  - not on any antiplatelets, SCDS ordered. EKG with flipped t waves in anterior leads no prior ekg available for comparison  - no acute chest pain, asymptomatic  - QTC prolonged, will not order zofran/ QTC prolonging agents.

## 2020-01-21 NOTE — ED PROVIDER NOTE - CLINICAL SUMMARY MEDICAL DECISION MAKING FREE TEXT BOX
pt with abd pain, n/v coffee pt with abd pain, n/v coffee ground emesis. labs and ct reviewed. gi consulted dr hartman who advised admit for gi evaluation due to hx of varices, dr menjivar consulted to eval hernias. will admit to medicine.

## 2020-01-21 NOTE — H&P ADULT - PROBLEM SELECTOR PLAN 3
chronic, however causing significant pain with cough  - surgery, Dr. Freeman consulted, not recommending surgery at this time  - pain control s/p morphine IV in the ED.  Will continue with oral pain medications, antinausea, cough suppressant. chronic, however causing significant pain with cough  - surgery, Dr. Freeman consulted, not recommending surgery at this time  - pain control s/p morphine IV in the ED.  Will continue with oral pain medications, antinausea, cough suppressant. Will avoid tylenol and NSAIds due to her cirrhosis and GIB

## 2020-01-21 NOTE — ED PROVIDER NOTE - ABDOMINAL TENDER
suprapubic/left lower quadrant/periumbilical/umbilical/+reducible umbilical hernia. nonreducible right inguinal hernia/epigastric/right lower quadrant

## 2020-01-21 NOTE — ED ADULT NURSE NOTE - OBJECTIVE STATEMENT
patient came in ED with c/o generalized abdominal pain radiating to the back X 3 days with feeling of being bloated, coffee ground vomitus and liquidy diarrhea. afebrile. alert and oriented x 4. non-labored respiration noted. abdomen tender and distended with protrusion of umbilical hernia and inguinal hernia. ANA Tucker s/e patient at the bedside.

## 2020-01-21 NOTE — H&P ADULT - PROBLEM SELECTOR PLAN 2
Cirrhosis with portal HTN 2/2 HCV with esophageal varices  - CT abd pelvis with esophageal varices, ascites, will diuresis with 40mg IV furosemide.   - home medication furosemide 20mg daily, patient admits to being noncompliant with medication and having about 15 lb weight gain in past 2 weeks.   - daily weights Cirrhosis with portal HTN 2/2 HCV with esophageal varices  - CT abd pelvis with esophageal varices, ascites, will diuresis with 40mg IV furosemide.   - home medication furosemide 20mg daily, patient admits to being noncompliant with medication and having about 15 lb weight gain in past 2 weeks.   - daily weights  -have increased lasix to 40mg daily given her weight gain Cirrhosis with portal HTN 2/2 HCV with esophageal varices  - CT abd pelvis with esophageal varices, ascites, will hold off on diuresis until after NPO  - home medication furosemide 20mg daily, patient admits to being noncompliant with medication and having about 15 lb weight gain in past 2 weeks.   - daily weights  -have increased lasix to 40mg daily given her weight gain Cirrhosis with portal HTN 2/2 HCV with esophageal varices  - CT abd pelvis with esophageal varices, ascites, will hold off on diuresis until after NPO  - home medication furosemide 20mg daily, patient admits to being noncompliant with medication and having about 15 lb weight gain in past 2 weeks.   - daily weights, NPO would restart furosemide after procedure.

## 2020-01-21 NOTE — H&P ADULT - ATTENDING COMMENTS
Agree with the above.     T(C): 37.1 (01-21-20 @ 15:20), Max: 37.2 (01-21-20 @ 11:53)  HR: 88 (01-21-20 @ 15:20) (83 - 88)  BP: 134/78 (01-21-20 @ 15:20) (118/76 - 134/78)  RR: 16 (01-21-20 @ 15:20) (16 - 18)  SpO2: 98% (01-21-20 @ 15:20) (96% - 98%)  Wt(kg): --    Physical Exam:   GENERAL: well-groomed, well-developed, NAD  HEENT: head NC/AT; EOM intact, conjunctiva & sclera clear; hearing grossly intact, moist mucous membranes  NECK: supple, no JVD  RESPIRATORY: CTA B/L, no wheezing, rales, rhonchi or rubs  CARDIOVASCULAR: S1&S2, RRR, no murmurs or gallops  ABDOMEN: soft, non-tender, non-distended, + Bowel sounds x4 quadrants, no guarding, rebound or rigidity  MUSCULOSKELETAL:  b/l LE non pitting edema  LYMPH: no cervical lymphadenopathy  VASCULAR: Radial pulses 2+ bilaterally, no varicose veins   SKIN: warm and dry, color normal  NEUROLOGIC: AA&O X3, CN2-12 intact w/ no focal deficits, no sensory loss, motor Strength 5/5 in UE & LE B/L  Psych: Normal mood and affect, normal behavior    Plan:   GI consult, NPO, trend Hgb, monitor lytes in setting of diuresis.   Will need outpatient MRI to further evaluate her liver.   Thrombocytopenia: monitor platelet count, this is low likely due to her cirrhosis.     Remainder as above.

## 2020-01-21 NOTE — ED PROVIDER NOTE - ATTENDING CONTRIBUTION TO CARE
65yo F with portal HTN, cirrhosis sec Hep C which was treated successfully c/o vomiting  dark material and abdominal pain and diarrhea. pt st abd distended. pt st improved this morning while in ED. pt denies fever, chills, cp, sob, trauma, back pain, ha, numbness, weakness, tingling, bladder changes.  pmd= dr evelin anna [facility] care  GI= Dr roach  pt may be receiving TIPS at [facility] in near future.  pt st has known umbilical hernia and left inguinal hernia which was large yesterday but improved now.    Gen: Awake, Alert, WD, WN, NAD  Head:  NC/AT  Eyes:  PERRL, EOMI, Conjunctiva pink, lids normal, no scleral icterus  ENT: OP clear, no exudates, no erythema, uvula midline, TMs clear bilaterally, moist mucus membranes  Neck: supple, nontender, no meningismus, no JVD, trachea midline  Cardiac/CV:  S1 S2, RRR, no M/G/R  Respiratory/Pulm:  CTAB, good air movement, normal resp effort, no wheezes/stridor/retractions/rales/rhonchi  Gastrointestinal/Abdomen:  Soft, nontender, nondistended, +BS, no rebound/guarding, + umbilical hernia soft non tender, no inguinal hernia palpated.  Back:  no CVAT, no MLT  Ext:  warm, well perfused, moving all extremities spontaneously, no peripheral edema, distal pulses intact  Skin: intact, no rash  Neuro:  AAOx3, sensation intact, motor 5/5 x 4 extremities, speech clear   labs, protonix, guaiac, CT Abd/Pelvis, suspect gastric ulcer.

## 2020-01-21 NOTE — H&P ADULT - NSHPREVIEWOFSYSTEMS_GEN_ALL_CORE
CONSTITUTIONAL: admits to chills, denies fever, fatigue, weakness  HEENT: denies blurred vision, sore throat  SKIN: denies new lesions, rash  CARDIOVASCULAR: denies chest pain, chest pressure, palpitations  RESPIRATORY: denies shortness of breath, sputum production  GASTROINTESTINAL: denies nausea, vomiting, diarrhea, abdominal pain  GENITOURINARY: denies dysuria, discharge  NEUROLOGICAL: denies numbness, headache, focal weakness  MUSCULOSKELETAL: denies new joint pain, muscle aches  HEMATOLOGIC: denies gross bleeding, bruising  LYMPHATICS: denies enlarged lymph nodes, extremity swelling  PSYCHIATRIC: denies recent changes in anxiety, depression  ENDOCRINOLOGIC: denies sweating, cold or heat intolerance CONSTITUTIONAL: admits to chills, denies fever, fatigue, weakness  HEENT: denies blurred vision, sore throat  SKIN: denies new lesions, rash  CARDIOVASCULAR: admits to palpitations, denies chest pain, chest pressure  RESPIRATORY: denies shortness of breath, sputum production  GASTROINTESTINAL: admits to nausea, vomiting, diarrhea, abdominal pain  GENITOURINARY: denies dysuria, discharge  NEUROLOGICAL: denies numbness, headache, focal weakness  MUSCULOSKELETAL: denies new joint pain, muscle aches  HEMATOLOGIC: denies gross bleeding, bruising  LYMPHATICS: denies enlarged lymph nodes, extremity swelling  ENDOCRINOLOGIC: denies sweating, cold or heat intolerance

## 2020-01-21 NOTE — CONSULT NOTE ADULT - GASTROINTESTINAL COMMENTS
Umbilical hernia soft and reducible.  Right inguinal hernia positive cough impulse.  Soft and reducible

## 2020-01-21 NOTE — H&P ADULT - PROBLEM SELECTOR PLAN 1
acute GIB with coffee ground emesis, abdominal pain, dark tarry stool  - hemodynamically stable, H/H stable  - patient medical history cirrhosis (last scope within 1 year ago neg) with prominent esophageal varices seen on CT scan, recent URI treated with amoxicillin, cefdinir and methylprednisolone dose pack, patient also with inguinal and umbilical hernia seen on CT scan unlikely the cause of the bleeding.  - UGIB could be 2/2 esophageal varices vs recent steroid use, patient denies any NSAID other OTC medications.  - admit to [facility] with remote tele  - clear liquid, NPO after midnight, IV PPI BID  - GI consulted, Dr. Mcdowell acute GIB with coffee ground emesis, abdominal pain, dark tarry stool  - hemodynamically stable, H/H stable  - patient medical history cirrhosis (last scope within 1 year ago neg) with prominent esophageal varices seen on CT scan, recent URI treated with amoxicillin, cefdinir and methylprednisolone dose pack, patient also with inguinal and umbilical hernia seen on CT scan unlikely the cause of the bleeding.  - UGIB unlikely to be variceal, other etiology such as esophagitis can be contributing, has had recent steroid use, patient denies any NSAID other OTC medications.  - admit to [facility] with remote tele  - clear liquid, NPO after midnight, IV PPI BID  - GI consulted, Dr. Mcdowell acute GIB with coffee ground emesis, abdominal pain, dark tarry stool  - hemodynamically stable, H/H stable  - patient medical history cirrhosis (last scope within 1 year ago neg) with prominent esophageal varices seen on CT scan, recent URI treated with amoxicillin, cefdinir and methylprednisolone dose pack, patient also with inguinal and umbilical hernia seen on CT scan unlikely the cause of the bleeding.  - UGIB unlikely to be variceal, other etiology such as esophagitis can be contributing, has had recent steroid use, patient denies any NSAID other OTC medications.  - admit to [facility] with remote tele  - NPO , IV PPI BID  - GI consulted, Dr. Mcdowell acute GIB with coffee ground emesis, abdominal pain, dark tarry stool  - hemodynamically stable, H/H stable  - patient medical history cirrhosis (last scope within 1 year ago neg) with prominent esophageal varices seen on CT scan, recent URI treated with amoxicillin, cefdinir and methylprednisolone dose pack, patient also with inguinal and umbilical hernia seen on CT scan unlikely the cause of the bleeding.  - UGIB unlikely to be variceal, other etiology such as esophagitis can be contributing, has had recent steroid use, patient denies any NSAID other OTC medications.  - admit to [facility] with remote tele  - NPO , IV PPI BID, will hold diuresis until after NPO  - GI consulted, Dr. Mcdowell

## 2020-01-21 NOTE — H&P ADULT - HISTORY OF PRESENT ILLNESS
CHARTING IN PROGRESS  64 year old female with PMHx cirrhosis presenting to [facility] ED on 1/21/20 with coffee ground emesis.     In the Ed, VS: T 97.9, HR 85, /81, RR 18, SpO2 96 on RA.  Labs significant for WBC 9.96, Hgb 15.5, Hct 42.5, Plt 99, BMP wnl, Tbili 4.1, Alp 161, AST 59, ALT 36. Imaging:  CT abd/pelvis: Cirrhosis with splenomegaly ascites and varices as described. Mild diffuse small and large bowel thickening likely a portal enterocolopathy.  Diverticulosis coli, no diverticulitis. Periumbilical hernia containing venous collaterals. Right inguinal hernia containing a knuckle of nonobstructive small bowel. 64 year old female with PMHx cirrhosis presenting to [facility] ED on 1/21/20 with coffee ground emesis.     In the Ed, VS: T 97.9, HR 85, /81, RR 18, SpO2 96 on RA.  Labs significant for WBC 9.96, Hgb 15.5, Hct 42.5, Plt 99, BMP wnl, Tbili 4.1, Alp 161, AST 59, ALT 36. Received 1L NS bolus, IV morphine, IV Zofran   Imaging:  CT abd/pelvis: Cirrhosis with splenomegaly ascites and varices as described. Mild diffuse small and large bowel thickening likely a portal enterocolopathy.  Diverticulosis coli, no diverticulitis. Periumbilical hernia containing venous collaterals. Right inguinal hernia containing a knuckle of nonobstructive small bowel.  EKG: 64 year old female with PMHx cirrhosis with portal HTN 2/2 HCV, HTN, HLD, umbilical hernia, inguinal hernia presenting to [facility] ED on 1/21/20 with coffee ground emesis for 3 days.  Patient reports 3-4 episodes of coffee ground emesis per day for 3 days associated with nausea, chills, epigastric pain, abdominal pain, dark tarry stools, dark urine.  Patient reports generalized abdominal pain, suprapubic pain and R flank pain 10/10 increased with cough.  Patient has 2 known hernias (umbilical and inguinal) which she was deemed not a surgical candidate due to her portal hypertension 2/2 HCV (cured) followed by hepatologist at [facility] Dr. Chung.  Denies any bright red blood, any history of esophageal varices (last colonoscopy/endoscopy within 1 year negative).  Patient denies taking any NSAIDS only admits to taking Tylenol supplement.  States she does not always take her furosemide (last dose about a week ago).  Of note, patient reports she has had a cough since the middle of December.  Reports having had the "flu" which was treated outpatient with two separate courses of antibiotics (Amoxicillin for 10 days and Cefdinir for 10 days) which was associated with a cough.  Patient was also given Promethazine and solumedrol dose pack 12 days prior to admission.     Patient reports HCV diagnosis >10 years ago and treated and cured.  Patient denies any previous IVDA, patient and  both with diagnosis but unknown etiology.      In the Ed, VS: T 97.9, HR 85, /81, RR 18, SpO2 96 on RA.  Labs significant for WBC 9.96, Hgb 15.5, Hct 42.5, Plt 99, BMP wnl, Tbili 4.1, Alp 161, AST 59, ALT 36. Received 1L NS bolus, IV morphine, IV Zofran   Imaging:  CT abd/pelvis: Prominent periesophageal varices. Cirrhosis with splenomegaly ascites and varices as described. Mild diffuse small and large bowel thickening likely a portal enterocolopathy.  Diverticulosis coli, no diverticulitis. Periumbilical hernia containing venous collaterals. Right inguinal hernia containing a knuckle of nonobstructive small bowel.  EKG: 64 year old female with PMHx cirrhosis with portal HTN 2/2 HCV, HTN, HLD, umbilical hernia, inguinal hernia presenting to [facility] ED on 1/21/20 with coffee ground emesis for 3 days.  Patient reports 3-4 episodes of coffee ground emesis per day for 3 days associated with nausea, chills, epigastric pain, abdominal pain, dark tarry stools, dark urine.  Patient reports generalized abdominal pain, suprapubic pain and R flank pain 10/10 increased with cough.  Patient has 2 known hernias (umbilical and inguinal) which she was deemed not a surgical candidate due to her portal hypertension 2/2 HCV (cured) followed by hepatologist at [facility] Dr. Chung.  Denies any bright red blood, any history of esophageal varices (last colonoscopy/endoscopy within 1 year negative).  Patient denies taking any NSAIDS only admits to taking Tylenol supplement.  States she does not always take her furosemide (last dose about a week ago). has gained 15lbs in 2 weeks she states.  Of note, patient reports she has had a cough since the middle of December.  Reports having had the "flu" which was treated outpatient with two separate courses of antibiotics (Amoxicillin for 10 days and Cefdinir for 10 days) which was associated with a cough.  Patient was also given Promethazine and solumedrol dose pack 12 days prior to admission.     Patient reports HCV diagnosis >10 years ago and treated and cured.  Patient denies any previous IVDA, patient and  both with diagnosis but unknown etiology.      In the Ed, VS: T 97.9, HR 85, /81, RR 18, SpO2 96 on RA.  Labs significant for WBC 9.96, Hgb 15.5, Hct 42.5, Plt 99, BMP wnl, Tbili 4.1, Alp 161, AST 59, ALT 36. Received 1L NS bolus, IV morphine, IV Zofran   Imaging:  CT abd/pelvis: Prominent periesophageal varices. Cirrhosis with splenomegaly ascites and varices as described. Mild diffuse small and large bowel thickening likely a portal enterocolopathy.  Diverticulosis coli, no diverticulitis. Periumbilical hernia containing venous collaterals. Right inguinal hernia containing a knuckle of nonobstructive small bowel.  EKG: 64 year old female with PMHx cirrhosis with portal HTN 2/2 HCV, HTN, HLD, umbilical hernia, inguinal hernia presenting to [facility] ED on 1/21/20 with coffee ground emesis for 3 days.  Patient reports 3-4 episodes of coffee ground emesis per day for 3 days associated with nausea, chills, epigastric pain, abdominal pain, dark tarry stools, dark urine.  Patient reports generalized abdominal pain, suprapubic pain and R flank pain 10/10 increased with cough.  Patient has 2 known hernias (umbilical and inguinal) which she was deemed not a surgical candidate due to her portal hypertension 2/2 HCV (cured) followed by hepatologist at [facility] Dr. Chung.  Denies any bright red blood, any history of esophageal varices (last colonoscopy/endoscopy within 1 year negative).  Patient denies taking any NSAIDS only admits to taking Tylenol supplement.  States she does not always take her furosemide (last dose about a week ago). has gained 15lbs in 2 weeks she states.  Of note, patient reports she has had a cough since the middle of December.  Reports having had the "flu" which was treated outpatient with two separate courses of antibiotics (Amoxicillin for 10 days and Cefdinir for 10 days) which was associated with a cough.  Patient was also given Promethazine and solumedrol dose pack 12 days prior to admission.     Patient reports HCV diagnosis >10 years ago and treated and cured.  Patient denies any previous IVDA, patient and  both with diagnosis but unknown etiology.      In the Ed, VS: T 97.9, HR 85, /81, RR 18, SpO2 96 on RA.  Labs significant for WBC 9.96, Hgb 15.5, Hct 42.5, Plt 99, BMP wnl, Tbili 4.1, Alp 161, AST 59, ALT 36. Received 1L NS bolus, IV morphine, IV Zofran   Imaging:  CT abd/pelvis: Prominent periesophageal varices. Cirrhosis with splenomegaly ascites and varices as described. Mild diffuse small and large bowel thickening likely a portal enterocolopathy.  Diverticulosis coli, no diverticulitis. Periumbilical hernia containing venous collaterals. Right inguinal hernia containing a knuckle of nonobstructive small bowel.  EKG:  NSR VR 75, flipped t waves in anterior leads, qtc 472

## 2020-01-21 NOTE — ED PROVIDER NOTE - OBJECTIVE STATEMENT
pt is a 63yo female with pt is a 65yo female with pmhx of hcv, cirrhosis, portal htn presents with abd painx  3 days. pt reports diffuse abd pain with n/v and diarrhea. pt reports vomit consists of coffee ground emesis, last time pta. pt denies having symptoms in the past. pt did not take anything for symptoms.

## 2020-01-21 NOTE — H&P ADULT - ASSESSMENT
64 year old female with PMHx cirrhosis with portal HTN 2/2 HCV, HTN, HLD, umbilical hernia, inguinal hernia admitted for evaluation of GIB.

## 2020-01-22 DIAGNOSIS — K92.0 HEMATEMESIS: ICD-10-CM

## 2020-01-22 LAB
ALBUMIN SERPL ELPH-MCNC: 2.2 G/DL — LOW (ref 3.3–5)
ALP SERPL-CCNC: 118 U/L — SIGNIFICANT CHANGE UP (ref 40–120)
ALT FLD-CCNC: 29 U/L — SIGNIFICANT CHANGE UP (ref 12–78)
ANION GAP SERPL CALC-SCNC: 6 MMOL/L — SIGNIFICANT CHANGE UP (ref 5–17)
APTT BLD: 30.4 SEC — SIGNIFICANT CHANGE UP (ref 28.5–37)
AST SERPL-CCNC: 42 U/L — HIGH (ref 15–37)
BASOPHILS # BLD AUTO: 0.03 K/UL — SIGNIFICANT CHANGE UP (ref 0–0.2)
BASOPHILS NFR BLD AUTO: 0.5 % — SIGNIFICANT CHANGE UP (ref 0–2)
BILIRUB SERPL-MCNC: 2.4 MG/DL — HIGH (ref 0.2–1.2)
BUN SERPL-MCNC: 13 MG/DL — SIGNIFICANT CHANGE UP (ref 7–23)
CALCIUM SERPL-MCNC: 7.9 MG/DL — LOW (ref 8.5–10.1)
CHLORIDE SERPL-SCNC: 107 MMOL/L — SIGNIFICANT CHANGE UP (ref 96–108)
CO2 SERPL-SCNC: 26 MMOL/L — SIGNIFICANT CHANGE UP (ref 22–31)
CREAT SERPL-MCNC: 0.51 MG/DL — SIGNIFICANT CHANGE UP (ref 0.5–1.3)
EOSINOPHIL # BLD AUTO: 0.18 K/UL — SIGNIFICANT CHANGE UP (ref 0–0.5)
EOSINOPHIL NFR BLD AUTO: 2.9 % — SIGNIFICANT CHANGE UP (ref 0–6)
GLUCOSE SERPL-MCNC: 85 MG/DL — SIGNIFICANT CHANGE UP (ref 70–99)
HCT VFR BLD CALC: 34.1 % — LOW (ref 34.5–45)
HCT VFR BLD CALC: 35 % — SIGNIFICANT CHANGE UP (ref 34.5–45)
HCV AB S/CO SERPL IA: 12.86 S/CO — HIGH (ref 0–0.99)
HCV AB SERPL-IMP: REACTIVE
HGB BLD-MCNC: 11.8 G/DL — SIGNIFICANT CHANGE UP (ref 11.5–15.5)
HGB BLD-MCNC: 12.2 G/DL — SIGNIFICANT CHANGE UP (ref 11.5–15.5)
IMM GRANULOCYTES NFR BLD AUTO: 0.3 % — SIGNIFICANT CHANGE UP (ref 0–1.5)
INR BLD: 1.46 RATIO — HIGH (ref 0.88–1.16)
LYMPHOCYTES # BLD AUTO: 1.06 K/UL — SIGNIFICANT CHANGE UP (ref 1–3.3)
LYMPHOCYTES # BLD AUTO: 17 % — SIGNIFICANT CHANGE UP (ref 13–44)
MCHC RBC-ENTMCNC: 34.4 PG — HIGH (ref 27–34)
MCHC RBC-ENTMCNC: 34.6 GM/DL — SIGNIFICANT CHANGE UP (ref 32–36)
MCHC RBC-ENTMCNC: 34.9 GM/DL — SIGNIFICANT CHANGE UP (ref 32–36)
MCHC RBC-ENTMCNC: 35.5 PG — HIGH (ref 27–34)
MCV RBC AUTO: 101.7 FL — HIGH (ref 80–100)
MCV RBC AUTO: 99.4 FL — SIGNIFICANT CHANGE UP (ref 80–100)
MONOCYTES # BLD AUTO: 0.85 K/UL — SIGNIFICANT CHANGE UP (ref 0–0.9)
MONOCYTES NFR BLD AUTO: 13.7 % — SIGNIFICANT CHANGE UP (ref 2–14)
NEUTROPHILS # BLD AUTO: 4.08 K/UL — SIGNIFICANT CHANGE UP (ref 1.8–7.4)
NEUTROPHILS NFR BLD AUTO: 65.6 % — SIGNIFICANT CHANGE UP (ref 43–77)
NRBC # BLD: 0 /100 WBCS — SIGNIFICANT CHANGE UP (ref 0–0)
NRBC # BLD: 0 /100 WBCS — SIGNIFICANT CHANGE UP (ref 0–0)
PLATELET # BLD AUTO: 68 K/UL — LOW (ref 150–400)
PLATELET # BLD AUTO: 73 K/UL — LOW (ref 150–400)
POTASSIUM SERPL-MCNC: 3.5 MMOL/L — SIGNIFICANT CHANGE UP (ref 3.5–5.3)
POTASSIUM SERPL-SCNC: 3.5 MMOL/L — SIGNIFICANT CHANGE UP (ref 3.5–5.3)
PROT SERPL-MCNC: 5.6 G/DL — LOW (ref 6–8.3)
PROTHROM AB SERPL-ACNC: 16.8 SEC — HIGH (ref 10–12.9)
RBC # BLD: 3.43 M/UL — LOW (ref 3.8–5.2)
RBC # BLD: 3.44 M/UL — LOW (ref 3.8–5.2)
RBC # FLD: 15.2 % — HIGH (ref 10.3–14.5)
RBC # FLD: 15.2 % — HIGH (ref 10.3–14.5)
SODIUM SERPL-SCNC: 139 MMOL/L — SIGNIFICANT CHANGE UP (ref 135–145)
WBC # BLD: 6.22 K/UL — SIGNIFICANT CHANGE UP (ref 3.8–10.5)
WBC # BLD: 6.34 K/UL — SIGNIFICANT CHANGE UP (ref 3.8–10.5)
WBC # FLD AUTO: 6.22 K/UL — SIGNIFICANT CHANGE UP (ref 3.8–10.5)
WBC # FLD AUTO: 6.34 K/UL — SIGNIFICANT CHANGE UP (ref 3.8–10.5)

## 2020-01-22 PROCEDURE — 99233 SBSQ HOSP IP/OBS HIGH 50: CPT

## 2020-01-22 RX ORDER — FUROSEMIDE 40 MG
20 TABLET ORAL DAILY
Refills: 0 | Status: DISCONTINUED | OUTPATIENT
Start: 2020-01-23 | End: 2020-01-23

## 2020-01-22 RX ORDER — POTASSIUM CHLORIDE 20 MEQ
40 PACKET (EA) ORAL ONCE
Refills: 0 | Status: COMPLETED | OUTPATIENT
Start: 2020-01-22 | End: 2020-01-22

## 2020-01-22 RX ORDER — PANTOPRAZOLE SODIUM 20 MG/1
40 TABLET, DELAYED RELEASE ORAL
Refills: 0 | Status: DISCONTINUED | OUTPATIENT
Start: 2020-01-23 | End: 2020-01-23

## 2020-01-22 RX ORDER — DEXTROSE MONOHYDRATE, SODIUM CHLORIDE, AND POTASSIUM CHLORIDE 50; .745; 4.5 G/1000ML; G/1000ML; G/1000ML
1000 INJECTION, SOLUTION INTRAVENOUS
Refills: 0 | Status: DISCONTINUED | OUTPATIENT
Start: 2020-01-22 | End: 2020-01-22

## 2020-01-22 RX ADMIN — ZOLPIDEM TARTRATE 5 MILLIGRAM(S): 10 TABLET ORAL at 21:16

## 2020-01-22 RX ADMIN — Medication 1 PATCH: at 14:35

## 2020-01-22 RX ADMIN — Medication 40 MILLIEQUIVALENT(S): at 21:16

## 2020-01-22 RX ADMIN — Medication 1 PATCH: at 19:18

## 2020-01-22 RX ADMIN — DEXTROSE MONOHYDRATE, SODIUM CHLORIDE, AND POTASSIUM CHLORIDE 60 MILLILITER(S): 50; .745; 4.5 INJECTION, SOLUTION INTRAVENOUS at 14:35

## 2020-01-22 RX ADMIN — PANTOPRAZOLE SODIUM 40 MILLIGRAM(S): 20 TABLET, DELAYED RELEASE ORAL at 05:35

## 2020-01-22 NOTE — PROGRESS NOTE ADULT - ASSESSMENT
64 year old female with PMH of cirrhosis with portal HTN 2/2 HCV, HTN, HLD, umbilical hernia, R inguinal hernia p/w abdominal pain, nausea, coffee ground emesis, admitted with suspicion of upper GI bleed.

## 2020-01-22 NOTE — PROGRESS NOTE ADULT - PROBLEM SELECTOR PLAN 5
EKG with TWI in anterior leads that was compared to a EKG in 2008 that had less pronounced TWI  - no acute chest pain, no SOB, symptomatic  - QTC prolonged, will not order zofran/ QTC prolonging agents.  - recheck EKG

## 2020-01-22 NOTE — PROGRESS NOTE ADULT - PROBLEM SELECTOR PLAN 9
DVT ppx:  will treat with mechanical DVT ppx - SCDs, pt may have had bleeding and has low platelets and coagulopathy

## 2020-01-22 NOTE — PROGRESS NOTE ADULT - PROBLEM SELECTOR PLAN 6
- down to ~70  - suspect due to cirrhosis / splenomegaly  - continue to monitor  - not on any antiplatelets, SCDS ordered.

## 2020-01-22 NOTE — PROGRESS NOTE ADULT - PROBLEM SELECTOR PLAN 1
-pt reportedly had 2-3 days of coffee ground emesis and subsequent dark stool, which resolved on day of admission.   -pt had a drop in Hgb from 15 to ~12 in last 24 hours, but suspect pt's initial value of 15 was hemoconcentrated as pt reports having poor po intake with multiple bouts of vomiting for few days.   - FOBT was negative and pt admitted on my interview today that she had taken Pepto Bismol at home to help with GI symptoms.   - GI (Mcdowell) recs appreciated -- performed EGD today which has only mild esophageal varices and mild portal hypertensive gastropathy without signs of bleeding  -will give protonix 40mg po daily and c/w propranolol as per GI recs  -pt may have had some mild blood loss at some point but doubt significant blood loss related to upper GI bleed.   -suspect pt's symptoms may have been due to a gastroenteritis or possibly due to a period of partial SBO related to inguinal hernia that resolved spontaneously prior to admission CT scan. And that the dark appearance was potentially due to pt's ingestion of Pepto Bismol.   -surgery (Lydia) recs appreciated -- no sign of hernia incarceration at this point.   -pt had plans for potential TIPS as outpatient   -hemodynamically stable, H/H stable when rechecked this afternoon   -restarted pt on a diet per GI recs this evening.   -if pt tolerating diet well, will discharge tomorrow morning

## 2020-01-22 NOTE — PROVIDER CONTACT NOTE (CRITICAL VALUE NOTIFICATION) - SITUATION
Message left for MD hemoglobin 19 hours ago 15.1 current hemoglobin 11.8 MD made aware 19 hours ago hemoglobin 15.1 current hemoglobin 11.8. No BM/vomiting

## 2020-01-22 NOTE — PROGRESS NOTE ADULT - PROBLEM SELECTOR PLAN 3
- had been more painful in past couple of days prior to admission as per pt  - surgery, Dr. Freeman consulted, not recommending surgery at this time as hernia is easily reducible and not incarcerated on imaging  - pain had improved significantly and pt did not report tenderness on my exam today  - had followed with surgery as outpt but her surgeon did not wish to operate given her cirrhosis, varices and comorbidities. -- Pt had been recommended to pursue TIPS which she is following up for with her outpt physicians.

## 2020-01-22 NOTE — CONSULT NOTE ADULT - ASSESSMENT
cirrhosis  varices  hematemesis  elevated lfts    npo  cont IV proton pump inhibitor  ct demonstrates varices  may need octreotide pending EGD  if variceal bleed, will need IV antibiotics  prognosis gaurded
64y woman with known history of Cirrhosis, portal hypertension (follows at [facility]) and is awaiting TIPS procedure.  Known that she has umbilical as well as inguinal hernia which have not been repaired by her primary surgeons given the liver failure and ascites.  She had abdominal pain radiating to the back earlier today with what was described by ED to be coffee ground emesis.  Physical exam reveals small reducible chronic umbilical hernia, neck approximately 1.5 cm.  Right groin also with positive cough impulse but easily reducible upon lying supine.  no obstructive symptoms.    No role for acute surgical intervention.  H/H stable.  Continue diuresis and maintenance of fluid status for management of ascites .  F/U with Hepatologist.    Reconsult as needed.      Apparently she is being admitted for GI evaluation and EGD given coffee ground emesis.  Pt clearly has varices on CT indicative of portal HTN.

## 2020-01-22 NOTE — CONSULT NOTE ADULT - SUBJECTIVE AND OBJECTIVE BOX
[facility]  Montez Michaels PA-C  [address] [place] [id number]  [phone]      Chief Complaint:  Patient is a 64y old  Female who presents with a chief complaint of coffee ground emesis (2020 15:22)      HPI: 64 year old female with PMHx cirrhosis with portal HTN 2/2 HCV, HTN, HLD, umbilical hernia, inguinal hernia presenting to [facility] ED on 20 with coffee ground emesis for 3 days.  Patient reports 3-4 episodes of coffee ground emesis per day for 3 days associated with nausea, chills, epigastric pain, abdominal pain, dark tarry stools, dark urine.  Patient reports generalized abdominal pain, suprapubic pain and R flank pain 10/10 increased with cough.  Patient has 2 known hernias (umbilical and inguinal) which she was deemed not a surgical candidate due to her portal hypertension 2/2 HCV (cured) followed by hepatologist at [facility] Dr. Chung.  Denies any bright red blood, any history of esophageal varices (last colonoscopy/endoscopy within 1 year negative).  Patient denies taking any NSAIDS only admits to taking Tylenol supplement.  States she does not always take her furosemide (last dose about a week ago). has gained 15lbs in 2 weeks she states.  Of note, patient reports she has had a cough since the middle of December.  Reports having had the "flu" which was treated outpatient with two separate courses of antibiotics (Amoxicillin for 10 days and Cefdinir for 10 days) which was associated with a cough.  Patient was also given Promethazine and solumedrol dose pack 12 days prior to admission.     Allergies:  No Known Allergies      Medications:  benzonatate 100 milliGRAM(s) Oral three times a day  dextrose 5% + sodium chloride 0.45% with potassium chloride 20 mEq/L 1000 milliLiter(s) IV Continuous <Continuous>  nicotine -   7 mG/24Hr(s) Patch 1 patch Transdermal daily  pantoprazole  Injectable 40 milliGRAM(s) IV Push two times a day  propranolol LA 60 milliGRAM(s) Oral daily  traMADol 50 milliGRAM(s) Oral every 6 hours PRN  traMADol 75 milliGRAM(s) Oral every 6 hours PRN  zolpidem 5 milliGRAM(s) Oral at bedtime PRN      PMHX/PSHX:  Cirrhosis  HLD (hyperlipidemia)  HTN (hypertension)  Umbilical hernia  Inguinal hernia  No significant past surgical history      Family history:  FH: diabetes mellitus      Social History:     ROS:     General:  No wt loss, fevers, chills, night sweats, + fatigue,   Eyes:  Good vision, no reported pain  ENT:  No sore throat, pain, runny nose, dysphagia  CV:  No pain, palpitations, hypo/hypertension  Resp:  No dyspnea, cough, tachypnea, wheezing  GI:  No pain, No nausea, + vomiting, No diarrhea, No constipation, No weight loss, No fever, No pruritis, No rectal bleeding, No tarry stools, No dysphagia,  :  No pain, bleeding, incontinence, nocturia  Muscle:  No pain, weakness  Neuro:  No weakness, tingling, memory problems  Psych:  No fatigue, insomnia, mood problems, depression  Endocrine:  No polyuria, polydipsia, cold/heat intolerance  Heme:  No petechiae, ecchymosis, easy bruisability  Skin:  No rash, tattoos, scars, edema      PHYSICAL EXAM:   Vital Signs:  Vital Signs Last 24 Hrs  T(C): 36.5 (2020 12:10), Max: 37.1 (2020 15:20)  T(F): 97.7 (2020 12:10), Max: 98.7 (2020 15:20)  HR: 73 (2020 12:10) (73 - 88)  BP: 107/63 (2020 12:10) (101/62 - 134/78)  BP(mean): --  RR: 14 (2020 12:10) (14 - 18)  SpO2: 93% (2020 12:10) (93% - 98%)  Daily     Daily     GENERAL:  Appears stated age, well-groomed, well-nourished, no distress  HEENT:  NC/AT,  conjunctivae clear and pink, no thyromegaly, nodules, adenopathy, no JVD, sclera -anicteric  CHEST:  Full & symmetric excursion, no increased effort, breath sounds clear  HEART:  Regular rhythm, S1, S2, no murmur/rub/S3/S4, no abdominal bruit, no edema  ABDOMEN:  Soft, non-tender, non-distended, normoactive bowel sounds,   EXTEREMITIES:  no cyanosis,clubbing or edema  SKIN:  No rash/erythema/ecchymoses/petechiae/wounds/abscess/warm/dry  NEURO:  Alert, oriented, no asterixis, no tremor, no encephalopathy    LABS:                        11.8   6.22  )-----------( 68       ( 2020 07:15 )             34.1         139  |  107  |  13  ----------------------------<  85  3.5   |  26  |  0.51    Ca    7.9<L>      2020 07:15  Phos  2.7       Mg     1.9         TPro  5.6<L>  /  Alb  2.2<L>  /  TBili  2.4<H>  /  DBili  1.00<H>  /  AST  42<H>  /  ALT  29  /  AlkPhos  118      LIVER FUNCTIONS - ( 2020 07:15 )  Alb: 2.2 g/dL / Pro: 5.6 g/dL / ALK PHOS: 118 U/L / ALT: 29 U/L / AST: 42 U/L / GGT: x           PT/INR - ( 2020 07:15 )   PT: 16.8 sec;   INR: 1.46 ratio         PTT - ( 2020 07:15 )  PTT:30.4 sec  Urinalysis Basic - ( 2020 11:41 )    Color: Miladys / Appearance: Clear / S.020 / pH: x  Gluc: x / Ketone: Trace  / Bili: Moderate / Urobili: 8   Blood: x / Protein: 30 mg/dL / Nitrite: Negative   Leuk Esterase: Negative / RBC: 0-2 /HPF / WBC Negative   Sq Epi: x / Non Sq Epi: Moderate / Bacteria: Many          Imaging:

## 2020-01-22 NOTE — PROGRESS NOTE ADULT - SUBJECTIVE AND OBJECTIVE BOX
s/p  upper gastrointestinal endoscopy    grade I varices, flattened by insufflation  mild portal htn gastropathy  small hiatal hernia  no gastric varices    rec:   reg diet  proton pump inhibitor daily  cont inderal  dc planning with outpatient follow up with her hepatologist

## 2020-01-22 NOTE — PROGRESS NOTE ADULT - SUBJECTIVE AND OBJECTIVE BOX
Patient is a 64y old  Female who presents with a chief complaint of coffee ground emesis, abd pain.      INTERVAL HPI/OVERNIGHT EVENTS: Pt states nausea and vomiting resolved yesterday. Abdominal pain has improved significantly, as well. Pt had EGD today which revealed Grade 1 esophageal varices, mild portal hypertensive gastropathy and no sign of bleeding. Pt denies fever, chills, CP, SOB.     MEDICATIONS  (STANDING):  benzonatate 100 milliGRAM(s) Oral three times a day  nicotine -   7 mG/24Hr(s) Patch 1 patch Transdermal daily  propranolol LA 60 milliGRAM(s) Oral daily    MEDICATIONS  (PRN):  traMADol 50 milliGRAM(s) Oral every 6 hours PRN Moderate Pain (4 - 6)  traMADol 75 milliGRAM(s) Oral every 6 hours PRN Severe Pain (7 - 10)  zolpidem 5 milliGRAM(s) Oral at bedtime PRN Insomnia      Allergies    No Known Allergies    Intolerances        REVIEW OF SYSTEMS:  CONSTITUTIONAL: No fever or chills  HEENT:  No headache, no sore throat  RESPIRATORY: No cough, wheezing, or shortness of breath  CARDIOVASCULAR: No chest pain, palpitations  GASTROINTESTINAL: much improved abd pain, Resolved nausea/vomiting  GENITOURINARY: No dysuria, frequency, or hematuria  NEUROLOGICAL: no focal weakness or dizziness  MUSCULOSKELETAL: no myalgias     Vital Signs Last 24 Hrs  T(C): 36.7 (2020 16:15), Max: 36.8 (2020 23:06)  T(F): 98 (2020 16:15), Max: 98.2 (2020 23:06)  HR: 78 (2020 16:15) (73 - 81)  BP: 111/70 (2020 16:15) (101/62 - 116/71)  BP(mean): --  RR: 16 (2020 16:15) (14 - 18)  SpO2: 94% (2020 16:15) (93% - 94%)    PHYSICAL EXAM:  GENERAL: NAD  HEENT:  anicteric, moist mucous membranes  CHEST/LUNG:  CTA b/l, no rales, wheezes, or rhonchi  HEART:  RRR, S1, S2  ABDOMEN:  BS+, soft, nontender, nondistended, reducible and nontender umbilical and right inguinal hernias.   EXTREMITIES: no edema, cyanosis, or calf tenderness  NERVOUS SYSTEM: answers questions and follows commands appropriately    LABS:                        12.2   6.34  )-----------( 73       ( 2020 16:15 )             35.0     CBC Full  -  ( 2020 16:15 )  WBC Count : 6.34 K/uL  Hemoglobin : 12.2 g/dL  Hematocrit : 35.0 %  Platelet Count - Automated : 73 K/uL  Mean Cell Volume : 101.7 fl  Mean Cell Hemoglobin : 35.5 pg  Mean Cell Hemoglobin Concentration : 34.9 gm/dL  Auto Neutrophil # : x  Auto Lymphocyte # : x  Auto Monocyte # : x  Auto Eosinophil # : x  Auto Basophil # : x  Auto Neutrophil % : x  Auto Lymphocyte % : x  Auto Monocyte % : x  Auto Eosinophil % : x  Auto Basophil % : x    2020 07:15    139    |  107    |  13     ----------------------------<  85     3.5     |  26     |  0.51     Ca    7.9        2020 07:15  Phos  2.7       2020 07:15  Mg     1.9       2020 07:15    TPro  5.6    /  Alb  2.2    /  TBili  2.4    /  DBili  1.00   /  AST  42     /  ALT  29     /  AlkPhos  118    2020 07:15    PT/INR - ( 2020 07:15 )   PT: 16.8 sec;   INR: 1.46 ratio         PTT - ( 2020 07:15 )  PTT:30.4 sec  Urinalysis Basic - ( 2020 11:41 )    Color: Miladys / Appearance: Clear / S.020 / pH: x  Gluc: x / Ketone: Trace  / Bili: Moderate / Urobili: 8   Blood: x / Protein: 30 mg/dL / Nitrite: Negative   Leuk Esterase: Negative / RBC: 0-2 /HPF / WBC Negative   Sq Epi: x / Non Sq Epi: Moderate / Bacteria: Many      CAPILLARY BLOOD GLUCOSE              RADIOLOGY & ADDITIONAL TESTS:    Personally reviewed.     Consultant(s) Notes Reviewed:  [x] YES  [ ] NO Patient is a 64y old  Female who presents with a chief complaint of coffee ground emesis, abd pain.      INTERVAL HPI/OVERNIGHT EVENTS: Pt states nausea and vomiting resolved yesterday. Abdominal pain has improved significantly, as well. Pt had EGD today which revealed Grade 1 esophageal varices, mild portal hypertensive gastropathy and no sign of bleeding. Pt denies fever, chills, CP, SOB.     MEDICATIONS  (STANDING):  benzonatate 100 milliGRAM(s) Oral three times a day  nicotine -   7 mG/24Hr(s) Patch 1 patch Transdermal daily  propranolol LA 60 milliGRAM(s) Oral daily    MEDICATIONS  (PRN):  traMADol 50 milliGRAM(s) Oral every 6 hours PRN Moderate Pain (4 - 6)  traMADol 75 milliGRAM(s) Oral every 6 hours PRN Severe Pain (7 - 10)  zolpidem 5 milliGRAM(s) Oral at bedtime PRN Insomnia      Allergies    No Known Allergies    Intolerances      REVIEW OF SYSTEMS:  CONSTITUTIONAL: No fever or chills  HEENT:  No headache, no sore throat  RESPIRATORY: No cough, wheezing, or shortness of breath  CARDIOVASCULAR: No chest pain, palpitations  GASTROINTESTINAL: much improved abd pain, Resolved nausea/vomiting  GENITOURINARY: No dysuria, frequency, or hematuria  NEUROLOGICAL: no focal weakness or dizziness  MUSCULOSKELETAL: no myalgias     Vital Signs Last 24 Hrs  T(C): 36.7 (2020 16:15), Max: 36.8 (2020 23:06)  T(F): 98 (2020 16:15), Max: 98.2 (2020 23:06)  HR: 78 (2020 16:15) (73 - 81)  BP: 111/70 (2020 16:15) (101/62 - 116/71)  BP(mean): --  RR: 16 (2020 16:15) (14 - 18)  SpO2: 94% (2020 16:15) (93% - 94%)    PHYSICAL EXAM:  GENERAL: NAD  HEENT:  anicteric, moist mucous membranes  CHEST/LUNG:  CTA b/l, no rales, wheezes, or rhonchi  HEART:  RRR, S1, S2  ABDOMEN:  BS+, soft, nontender, nondistended, reducible and nontender umbilical and right inguinal hernias.   EXTREMITIES: no edema, cyanosis, or calf tenderness  NERVOUS SYSTEM: answers questions and follows commands appropriately    LABS:                        12.2   6.34  )-----------( 73       ( 2020 16:15 )             35.0     CBC Full  -  ( 2020 16:15 )  WBC Count : 6.34 K/uL  Hemoglobin : 12.2 g/dL  Hematocrit : 35.0 %  Platelet Count - Automated : 73 K/uL  Mean Cell Volume : 101.7 fl  Mean Cell Hemoglobin : 35.5 pg  Mean Cell Hemoglobin Concentration : 34.9 gm/dL  Auto Neutrophil # : x  Auto Lymphocyte # : x  Auto Monocyte # : x  Auto Eosinophil # : x  Auto Basophil # : x  Auto Neutrophil % : x  Auto Lymphocyte % : x  Auto Monocyte % : x  Auto Eosinophil % : x  Auto Basophil % : x    2020 07:15    139    |  107    |  13     ----------------------------<  85     3.5     |  26     |  0.51     Ca    7.9        2020 07:15  Phos  2.7       2020 07:15  Mg     1.9       2020 07:15    TPro  5.6    /  Alb  2.2    /  TBili  2.4    /  DBili  1.00   /  AST  42     /  ALT  29     /  AlkPhos  118    2020 07:15    PT/INR - ( 2020 07:15 )   PT: 16.8 sec;   INR: 1.46 ratio         PTT - ( 2020 07:15 )  PTT:30.4 sec  Urinalysis Basic - ( 2020 11:41 )    Color: Miladys / Appearance: Clear / S.020 / pH: x  Gluc: x / Ketone: Trace  / Bili: Moderate / Urobili: 8   Blood: x / Protein: 30 mg/dL / Nitrite: Negative   Leuk Esterase: Negative / RBC: 0-2 /HPF / WBC Negative   Sq Epi: x / Non Sq Epi: Moderate / Bacteria: Many      CAPILLARY BLOOD GLUCOSE              RADIOLOGY & ADDITIONAL TESTS:    Personally reviewed.     Consultant(s) Notes Reviewed:  [x] YES  [ ] NO

## 2020-01-22 NOTE — PROGRESS NOTE ADULT - PROBLEM SELECTOR PLAN 2
-Cirrhosis with portal HTN 2/2 HCV with esophageal varices  - restart home furosemide 20mg po daily, patient admits to being noncompliant with medication and reportedly had about 15 lb weight gain in few weeks.   - only mild ascites on admission CT Abd/P   - no need for paracentesis at this time  - pt being evaluated as outpt for possible TIPS  - pt will f/up with her hepatologist as outpt.

## 2020-01-22 NOTE — PROGRESS NOTE ADULT - ATTENDING COMMENTS
Note written by attending, see above.  Time spent: 40min. More than 50% of the visit was spent counseling the patient on her condition - vomiting, abd pain, hernias, esophageal varices.

## 2020-01-23 ENCOUNTER — TRANSCRIPTION ENCOUNTER (OUTPATIENT)
Age: 65
End: 2020-01-23

## 2020-01-23 VITALS
RESPIRATION RATE: 16 BRPM | DIASTOLIC BLOOD PRESSURE: 75 MMHG | OXYGEN SATURATION: 96 % | HEART RATE: 98 BPM | TEMPERATURE: 98 F | SYSTOLIC BLOOD PRESSURE: 115 MMHG

## 2020-01-23 LAB
ALBUMIN SERPL ELPH-MCNC: 2.2 G/DL — LOW (ref 3.3–5)
ALP SERPL-CCNC: 129 U/L — HIGH (ref 40–120)
ALT FLD-CCNC: 29 U/L — SIGNIFICANT CHANGE UP (ref 12–78)
ANION GAP SERPL CALC-SCNC: 6 MMOL/L — SIGNIFICANT CHANGE UP (ref 5–17)
AST SERPL-CCNC: 45 U/L — HIGH (ref 15–37)
BILIRUB SERPL-MCNC: 1.7 MG/DL — HIGH (ref 0.2–1.2)
BUN SERPL-MCNC: 11 MG/DL — SIGNIFICANT CHANGE UP (ref 7–23)
CALCIUM SERPL-MCNC: 8 MG/DL — LOW (ref 8.5–10.1)
CHLORIDE SERPL-SCNC: 107 MMOL/L — SIGNIFICANT CHANGE UP (ref 96–108)
CO2 SERPL-SCNC: 26 MMOL/L — SIGNIFICANT CHANGE UP (ref 22–31)
CREAT SERPL-MCNC: 0.48 MG/DL — LOW (ref 0.5–1.3)
GLUCOSE SERPL-MCNC: 101 MG/DL — HIGH (ref 70–99)
HCT VFR BLD CALC: 34.2 % — LOW (ref 34.5–45)
HGB BLD-MCNC: 12 G/DL — SIGNIFICANT CHANGE UP (ref 11.5–15.5)
MCHC RBC-ENTMCNC: 35.1 GM/DL — SIGNIFICANT CHANGE UP (ref 32–36)
MCHC RBC-ENTMCNC: 35.1 PG — HIGH (ref 27–34)
MCV RBC AUTO: 100 FL — SIGNIFICANT CHANGE UP (ref 80–100)
NRBC # BLD: 0 /100 WBCS — SIGNIFICANT CHANGE UP (ref 0–0)
PLATELET # BLD AUTO: 71 K/UL — LOW (ref 150–400)
POTASSIUM SERPL-MCNC: 3.9 MMOL/L — SIGNIFICANT CHANGE UP (ref 3.5–5.3)
POTASSIUM SERPL-SCNC: 3.9 MMOL/L — SIGNIFICANT CHANGE UP (ref 3.5–5.3)
PROT SERPL-MCNC: 5.7 G/DL — LOW (ref 6–8.3)
RBC # BLD: 3.42 M/UL — LOW (ref 3.8–5.2)
RBC # FLD: 14.9 % — HIGH (ref 10.3–14.5)
SODIUM SERPL-SCNC: 139 MMOL/L — SIGNIFICANT CHANGE UP (ref 135–145)
WBC # BLD: 6.49 K/UL — SIGNIFICANT CHANGE UP (ref 3.8–10.5)
WBC # FLD AUTO: 6.49 K/UL — SIGNIFICANT CHANGE UP (ref 3.8–10.5)

## 2020-01-23 PROCEDURE — 96374 THER/PROPH/DIAG INJ IV PUSH: CPT | Mod: XU

## 2020-01-23 PROCEDURE — 80053 COMPREHEN METABOLIC PANEL: CPT

## 2020-01-23 PROCEDURE — 87521 HEPATITIS C PROBE&RVRS TRNSC: CPT

## 2020-01-23 PROCEDURE — 74177 CT ABD & PELVIS W/CONTRAST: CPT

## 2020-01-23 PROCEDURE — 85610 PROTHROMBIN TIME: CPT

## 2020-01-23 PROCEDURE — 83690 ASSAY OF LIPASE: CPT

## 2020-01-23 PROCEDURE — 85027 COMPLETE CBC AUTOMATED: CPT

## 2020-01-23 PROCEDURE — 86901 BLOOD TYPING SEROLOGIC RH(D): CPT

## 2020-01-23 PROCEDURE — 84100 ASSAY OF PHOSPHORUS: CPT

## 2020-01-23 PROCEDURE — 85730 THROMBOPLASTIN TIME PARTIAL: CPT

## 2020-01-23 PROCEDURE — 82272 OCCULT BLD FECES 1-3 TESTS: CPT

## 2020-01-23 PROCEDURE — 83010 ASSAY OF HAPTOGLOBIN QUANT: CPT

## 2020-01-23 PROCEDURE — 83735 ASSAY OF MAGNESIUM: CPT

## 2020-01-23 PROCEDURE — 81001 URINALYSIS AUTO W/SCOPE: CPT

## 2020-01-23 PROCEDURE — 82248 BILIRUBIN DIRECT: CPT

## 2020-01-23 PROCEDURE — 99239 HOSP IP/OBS DSCHRG MGMT >30: CPT

## 2020-01-23 PROCEDURE — 96375 TX/PRO/DX INJ NEW DRUG ADDON: CPT

## 2020-01-23 PROCEDURE — 99285 EMERGENCY DEPT VISIT HI MDM: CPT | Mod: 25

## 2020-01-23 PROCEDURE — 86850 RBC ANTIBODY SCREEN: CPT

## 2020-01-23 PROCEDURE — 86900 BLOOD TYPING SEROLOGIC ABO: CPT

## 2020-01-23 PROCEDURE — 93005 ELECTROCARDIOGRAM TRACING: CPT

## 2020-01-23 PROCEDURE — 86803 HEPATITIS C AB TEST: CPT

## 2020-01-23 PROCEDURE — 36415 COLL VENOUS BLD VENIPUNCTURE: CPT

## 2020-01-23 PROCEDURE — 93010 ELECTROCARDIOGRAM REPORT: CPT

## 2020-01-23 RX ORDER — PANTOPRAZOLE SODIUM 20 MG/1
1 TABLET, DELAYED RELEASE ORAL
Qty: 30 | Refills: 0
Start: 2020-01-23 | End: 2020-02-21

## 2020-01-23 RX ORDER — NICOTINE POLACRILEX 2 MG
1 GUM BUCCAL
Qty: 30 | Refills: 0
Start: 2020-01-23

## 2020-01-23 RX ADMIN — Medication 1 PATCH: at 12:21

## 2020-01-23 RX ADMIN — PANTOPRAZOLE SODIUM 40 MILLIGRAM(S): 20 TABLET, DELAYED RELEASE ORAL at 05:42

## 2020-01-23 RX ADMIN — Medication 20 MILLIGRAM(S): at 05:42

## 2020-01-23 RX ADMIN — Medication 1 PATCH: at 07:48

## 2020-01-23 NOTE — PROGRESS NOTE ADULT - ASSESSMENT
hematemesis  EV  cirrhosis  ascites  elevated lfts  hx hcv    sp egd- findings of grade 1 varices flattened by insufflation, mild phg, and small hh (no gastric varices)  hgb stable, no further hematemesis  f/u hvc pcr  cont ppi daily  cont inderal as hr/bps tolerate  diuretics per primary  trend lfts, avoid hepatotoxins  diet as tolerated  dc planning w op f/u w private hepatologist        Advanced care planning was discussed with patient and family.  Advanced care planning forms were reviewed and discussed.  Risks, benefits and alternatives of gastroenterologic procedures were discussed in detail and all questions were answered.    30 minutes spent. hematemesis  EV  cirrhosis  ascites  elevated lfts  hx hcv    sp egd- findings of grade 1 varices flattened by insufflation, mild phg, and small hh (no gastric varices)  hgb stable, no further hematemesis  f/u hcv pcr  cont ppi daily  cont inderal as hr/bps tolerate  diuretics per primary  trend lfts, avoid hepatotoxins  diet as tolerated  dc planning w op f/u w private hepatologist        Advanced care planning was discussed with patient and family.  Advanced care planning forms were reviewed and discussed.  Risks, benefits and alternatives of gastroenterologic procedures were discussed in detail and all questions were answered.    30 minutes spent.

## 2020-01-23 NOTE — DISCHARGE NOTE NURSING/CASE MANAGEMENT/SOCIAL WORK - PATIENT PORTAL LINK FT
You can access the FollowMyHealth Patient Portal offered by [facility] by registering at the following website: [email or web address]. By joining [employer]’s FollowMyHealth portal, you will also be able to view your health information using other applications (apps) compatible with our system.

## 2020-01-23 NOTE — DISCHARGE NOTE PROVIDER - NSDCMRMEDTOKEN_GEN_ALL_CORE_FT
furosemide 20 mg oral tablet:   nicotine 7 mg/24 hr transdermal film, extended release: 1 film(s) transdermal once a day  pantoprazole 40 mg oral delayed release tablet: 1 tab(s) orally once a day (before a meal)  propranolol 60 mg oral capsule, extended release: 1 cap(s) orally once a day  zolpidem 5 mg oral tablet: 1 tab(s) orally once a day (at bedtime), As Needed

## 2020-01-23 NOTE — DISCHARGE NOTE PROVIDER - NSDCCPCAREPLAN_GEN_ALL_CORE_FT
PRINCIPAL DISCHARGE DIAGNOSIS  Diagnosis: Coffee ground emesis  Assessment and Plan of Treatment: You had a EGD which did not reveal any sign of bleeding. You had very small esophageal varices that did not seem suspicious for bleeding. You also have mild portal hypertensive gastropathy due to the cirrhosis but that had no evidence of bleeding either. Please take the prescribed pantoprazole once daily. Your vomiting and stool may have been dark also bacause of your use of Pepto Bismol which causes such discoloration. Also, continue your propranolol. Follow up with your gastroenterologist in 1-2 weeks.   It's possible that you had a temporary partial obstruction in your small bowel due to the inguinal hernia that was hurting you prior to admission, but on CT in the ER there was no longer sign of obstruction. You were evaluated by a surgeon who did not recommend any surgical procedure urgently and recommends following up with your own hepatologist and surgeon and continue to pursue TIPS if they feel that is best for you.      SECONDARY DISCHARGE DIAGNOSES  Diagnosis: Inguinal hernia  Assessment and Plan of Treatment: Your inguinal hernia that was painful prior to admission has improved. You were evaluated by a surgeon who did not recommend any surgical procedure urgently and recommends following up with your own hepatologist and surgeon and continue to pursue TIPS if they feel that is best for you.    Diagnosis: Cirrhosis  Assessment and Plan of Treatment: Follow up with your hepatologist.    Diagnosis: Thrombocytopenia  Assessment and Plan of Treatment: Your platelet count is low likely due to cirrhosis and splenomegaly. Your count is 71,000. Follow up with your PMD or a hematologist.    Diagnosis: Abnormal EKG  Assessment and Plan of Treatment: You were found to have small T-wave inversions on your EKG. Please follow up with your PMD regarding a cardiology f/up as they may consider a stress test especially if you will have surgery in the future. If you develop chest pain or shortness of breath or other new concerning symptoms, come to the ER.    Diagnosis: Nicotine dependence  Assessment and Plan of Treatment: Please use the nicotine patch to help you quit smoking.

## 2020-01-23 NOTE — PROGRESS NOTE ADULT - SUBJECTIVE AND OBJECTIVE BOX
INTERVAL HPI/OVERNIGHT EVENTS:  pt seen and examined  sp egd yesterday  denies n/v/abd pain  reports dark  bm late yesterday  tolerating po    MEDICATIONS  (STANDING):  benzonatate 100 milliGRAM(s) Oral three times a day  furosemide    Tablet 20 milliGRAM(s) Oral daily  nicotine -   7 mG/24Hr(s) Patch 1 patch Transdermal daily  pantoprazole    Tablet 40 milliGRAM(s) Oral before breakfast  propranolol LA 60 milliGRAM(s) Oral daily    MEDICATIONS  (PRN):  traMADol 50 milliGRAM(s) Oral every 6 hours PRN Moderate Pain (4 - 6)  traMADol 75 milliGRAM(s) Oral every 6 hours PRN Severe Pain (7 - 10)  zolpidem 5 milliGRAM(s) Oral at bedtime PRN Insomnia      Allergies    No Known Allergies    Intolerances        Review of Systems:    General:  No wt loss, fevers, chills, night sweats, fatigue   Eyes:  Good vision, no reported pain  ENT:  No sore throat, pain, runny nose, dysphagia  CV:  No pain, palpitations, hypo/hypertension  Resp:  No dyspnea, cough, tachypnea, wheezing  GI:  No pain, No nausea, No vomiting, No diarrhea, No constipation, No weight loss, No fever, No pruritis, No rectal bleeding, +melena, No dysphagia  :  No pain, bleeding, incontinence, nocturia  Muscle:  No pain, weakness  Neuro:  No weakness, tingling, memory problems  Psych:  No fatigue, insomnia, mood problems, depression  Endocrine:  No polyuria, polydypsia, cold/heat intolerance  Heme:  No petechiae, ecchymosis, easy bruisability  Skin:  No rash, tattoos, scars, edema      Vital Signs Last 24 Hrs  T(C): 36.7 (2020 07:30), Max: 36.8 (2020 23:47)  T(F): 98.1 (2020 07:30), Max: 98.3 (2020 23:47)  HR: 98 (2020 07:30) (73 - 98)  BP: 115/75 (2020 07:30) (103/51 - 116/71)  BP(mean): --  RR: 16 (2020 07:30) (14 - 16)  SpO2: 96% (2020 07:30) (92% - 96%)    PHYSICAL EXAM:    Constitutional: NAD  HEENT: ncat  Neck: No LAD  Gastrointestinal: softly dt  nt  Extremities: No peripheral edema  Neurological: A/O x 3      LABS:                        12.0   6.49  )-----------( 71       ( 2020 06:14 )             34.2     0123    139  |  107  |  11  ----------------------------<  101<H>  3.9   |  26  |  0.48<L>    Ca    8.0<L>      2020 06:14  Phos  2.7       Mg     1.9         TPro  5.7<L>  /  Alb  2.2<L>  /  TBili  1.7<H>  /  DBili  x   /  AST  45<H>  /  ALT  29  /  AlkPhos  129<H>      PT/INR - ( 2020 07:15 )   PT: 16.8 sec;   INR: 1.46 ratio         PTT - ( 2020 07:15 )  PTT:30.4 sec  Urinalysis Basic - ( 2020 11:41 )    Color: Miladys / Appearance: Clear / S.020 / pH: x  Gluc: x / Ketone: Trace  / Bili: Moderate / Urobili: 8   Blood: x / Protein: 30 mg/dL / Nitrite: Negative   Leuk Esterase: Negative / RBC: 0-2 /HPF / WBC Negative   Sq Epi: x / Non Sq Epi: Moderate / Bacteria: Many        RADIOLOGY & ADDITIONAL TESTS:

## 2020-01-23 NOTE — DISCHARGE NOTE PROVIDER - CARE PROVIDER_API CALL
Octavio Otto)  Gastroenterology  [address]  [facility], [address]  [place] [address]  Phone: [phone]  Fax: [phone]  Follow Up Time:

## 2020-01-23 NOTE — DISCHARGE NOTE PROVIDER - HOSPITAL COURSE
64 year old female with PMH of cirrhosis with portal HTN 2/2 HCV, HTN, HLD, umbilical hernia, R inguinal hernia p/w abdominal pain, nausea, coffee ground emesis, admitted with suspicion of upper GI bleed.     Pt had a drop in Hgb from 15 to ~12 in last 24 hours, but suspect pt's initial value of 15 was hemoconcentrated as pt reports having poor po intake with multiple bouts of vomiting for few days. FOBT was negative and pt admitted that she had taken Pepto Bismol at home to help with GI symptoms. GI () performed EGD, which showed only mild esophageal varices and mild portal hypertensive gastropathy without signs of bleeding. GI recommended protonix 40mg po daily and to c/w home propranolol. Pt may have had some mild blood loss at some point, but doubt significant blood loss related to upper GI bleed. Suspect pt's symptoms may have been due to a gastroenteritis or possibly due to a period of partial SBO related to inguinal hernia that resolved spontaneously prior to admission CT scan. And that the dark appearance was potentially due to pt's ingestion of Pepto Bismol. Surgery (Lydia) evaluated the pt and deemed no sign of hernia incarceration at this point and no need for any surgical intervention on this admission. Pt had plans for potential TIPS as outpatient     -hemodynamically stable, H/H stable     Pt tolerated diet well and was discharged to home. 64 year old female with PMH of cirrhosis with portal HTN 2/2 HCV, HTN, HLD, umbilical hernia, R inguinal hernia p/w abdominal pain, nausea, coffee ground emesis, admitted with suspicion of upper GI bleed.     CT Abd/P with po and IV contrast showed: No bowel obstruction. Diffuse large and small bowel bowel thickening, nonspecific in this setting likely related reflects a portal enterocolopathy. Cirrhosis with splenomegaly ascites and esophageal varices. Periumbilical hernia containing venous collaterals. Right inguinal hernia containing a knuckle of nonobstructive small bowel. Diverticulosis coli, no diverticulitis. Normal GB, bile ducts, pancreas, Kidneys/ureters wnl. Small ascites.    Pt had a drop in Hgb from 15 to ~12 in first 24 hours of admission, but suspect pt's initial value of 15 was hemoconcentrated as pt reported having poor po intake with multiple bouts of vomiting for few days PTA. FOBT was negative and pt admitted that she had taken Pepto Bismol at home to help with GI symptoms, which may have caused the dark color of emesis/stool. GI () performed EGD, which showed only mild esophageal varices and mild portal hypertensive gastropathy without signs of bleeding. Hgb remained stable ~12 during rest of admission. GI recommended protonix 40mg po daily and to c/w home propranolol. Pt may have had some mild blood loss at some point, but doubt significant blood loss related to upper GI bleed. Suspect pt's symptoms may have been due to a gastroenteritis or possibly due to a period of partial SBO related to inguinal hernia that resolved spontaneously prior to admission CT scan. Surgery (Lydia) evaluated the pt and deemed no sign of hernia incarceration at this point and no need for any surgical intervention on this admission. Pt has plans for potential TIPS as outpatient.    Diet was restarted and pt tolerated well. Abd pain, nausea, vomiting had all resolved and pt felt well. Pt discharged to home.    Of note, on admission EKG, pt was noted to have TWI in anterior leads, repeat EKG later in admission, demonstrated small TWI in anterior leads. Pt denies chest pain, shortness of breath, palpitations and had been ambulating the hallways without SOB or chest pain. Pt will f/up as outpatient for cardiology eval with cardiologist she had seen in the past, prior to any potential surgery. EKG provided to the pt.     Pt also noted to have thrombocytopenia with platelet count ~70K, which is likely due to cirrhosis/splenomegaly.         On day of discharge:     ROS: denies abd pain, nausea, vomiting, fever, chills, CP, SOB.    Vital Signs Last 24 Hrs    T(C): 36.7 (23 Jan 2020 07:30), Max: 36.8 (22 Jan 2020 23:47)    T(F): 98.1 (23 Jan 2020 07:30), Max: 98.3 (22 Jan 2020 23:47)    HR: 98 (23 Jan 2020 07:30) (78 - 98)    BP: 115/75 (23 Jan 2020 07:30) (103/51 - 115/75)    BP(mean): --    RR: 16 (23 Jan 2020 07:30) (15 - 16)    SpO2: 96% (23 Jan 2020 07:30) (92% - 96%)        PHYSICAL EXAM:    GENERAL: NAD    HEENT:  anicteric, moist mucous membranes    CHEST/LUNG:  CTA b/l, no rales, wheezes, or rhonchi    HEART:  RRR, S1, S2    ABDOMEN:  BS+, soft, nontender, nondistended, reducible and nontender umbilical and right inguinal hernias.     EXTREMITIES: no edema, cyanosis, or calf tenderness    NERVOUS SYSTEM: answers questions and follows commands appropriately        Time spent: 40min

## 2021-02-09 NOTE — PATIENT PROFILE ADULT - NSTOBACCO QUIT READY_GEN_A_CORE_SD
Testing  His ECG on 21 showed sinus rhythm with baseline artifact and was otherwise normal.  His echocardiogram showed:  Normal ventricular systolic performance and dimensions.   No atrial shunt was identified.   Normal Doppler across the proximal branch PA's.    No outflow tract obstruction.        Diagnosis      History of PFO and PPS--resolved  History of  UTI (pelvic kidney)      Discussion  The above shows that Keegan’s overall cardiac status is excellent and that his PFO and peripheral pulmonary artery stenosis resolved. He does not require medication for this and there is no indication for SBE prophylaxis. There is no cardiac contraindication for sedation or anesthesia. There should be no restriction placed on his activity from a cardiac standpoint.     No further outpatient cardiology follow-up is indicated unless new findings present at a later date or other questions arise.  
not motivated to quit

## 2021-05-01 NOTE — CONSULT NOTE ADULT - LYMPH NODES
Dr Metcalf Grow in room, discussing plan of care with pt. New order for Valtrex 500mg BID.  NST from day shift reviewed with md. No lymphadedenopathy

## 2021-05-27 ENCOUNTER — EMERGENCY (EMERGENCY)
Facility: HOSPITAL | Age: 66
LOS: 1 days | Discharge: HOME | End: 2021-05-27
Attending: EMERGENCY MEDICINE | Admitting: EMERGENCY MEDICINE
Payer: COMMERCIAL

## 2021-05-27 VITALS
OXYGEN SATURATION: 100 % | RESPIRATION RATE: 16 BRPM | HEIGHT: 67 IN | HEART RATE: 58 BPM | DIASTOLIC BLOOD PRESSURE: 48 MMHG | SYSTOLIC BLOOD PRESSURE: 108 MMHG | TEMPERATURE: 98 F

## 2021-05-27 DIAGNOSIS — Z95.828 PRESENCE OF OTHER VASCULAR IMPLANTS AND GRAFTS: Chronic | ICD-10-CM

## 2021-05-27 PROBLEM — K74.60 UNSPECIFIED CIRRHOSIS OF LIVER: Chronic | Status: ACTIVE | Noted: 2020-01-21

## 2021-05-27 LAB
ALBUMIN SERPL ELPH-MCNC: 2.6 G/DL — LOW (ref 3.3–5)
ALP SERPL-CCNC: 197 U/L — HIGH (ref 40–120)
ALT FLD-CCNC: 45 U/L — SIGNIFICANT CHANGE UP (ref 12–78)
ANION GAP SERPL CALC-SCNC: 6 MMOL/L — SIGNIFICANT CHANGE UP (ref 5–17)
APTT BLD: 30.9 SEC — SIGNIFICANT CHANGE UP (ref 27.5–35.5)
AST SERPL-CCNC: 106 U/L — HIGH (ref 15–37)
BASOPHILS # BLD AUTO: 0.04 K/UL — SIGNIFICANT CHANGE UP (ref 0–0.2)
BASOPHILS NFR BLD AUTO: 0.6 % — SIGNIFICANT CHANGE UP (ref 0–2)
BILIRUB SERPL-MCNC: 5.2 MG/DL — HIGH (ref 0.2–1.2)
BUN SERPL-MCNC: 12 MG/DL — SIGNIFICANT CHANGE UP (ref 7–23)
CALCIUM SERPL-MCNC: 7.9 MG/DL — LOW (ref 8.5–10.1)
CHLORIDE SERPL-SCNC: 107 MMOL/L — SIGNIFICANT CHANGE UP (ref 96–108)
CO2 SERPL-SCNC: 29 MMOL/L — SIGNIFICANT CHANGE UP (ref 22–31)
CREAT SERPL-MCNC: 0.65 MG/DL — SIGNIFICANT CHANGE UP (ref 0.5–1.3)
EOSINOPHIL # BLD AUTO: 0.27 K/UL — SIGNIFICANT CHANGE UP (ref 0–0.5)
EOSINOPHIL NFR BLD AUTO: 4.4 % — SIGNIFICANT CHANGE UP (ref 0–6)
GLUCOSE SERPL-MCNC: 93 MG/DL — SIGNIFICANT CHANGE UP (ref 70–99)
HCT VFR BLD CALC: 36.1 % — SIGNIFICANT CHANGE UP (ref 34.5–45)
HGB BLD-MCNC: 12.3 G/DL — SIGNIFICANT CHANGE UP (ref 11.5–15.5)
IMM GRANULOCYTES NFR BLD AUTO: 0.3 % — SIGNIFICANT CHANGE UP (ref 0–1.5)
INR BLD: 1.52 RATIO — HIGH (ref 0.88–1.16)
LYMPHOCYTES # BLD AUTO: 1.59 K/UL — SIGNIFICANT CHANGE UP (ref 1–3.3)
LYMPHOCYTES # BLD AUTO: 25.6 % — SIGNIFICANT CHANGE UP (ref 13–44)
MCHC RBC-ENTMCNC: 34.1 GM/DL — SIGNIFICANT CHANGE UP (ref 32–36)
MCHC RBC-ENTMCNC: 35 PG — HIGH (ref 27–34)
MCV RBC AUTO: 102.8 FL — HIGH (ref 80–100)
MONOCYTES # BLD AUTO: 0.75 K/UL — SIGNIFICANT CHANGE UP (ref 0–0.9)
MONOCYTES NFR BLD AUTO: 12.1 % — SIGNIFICANT CHANGE UP (ref 2–14)
NEUTROPHILS # BLD AUTO: 3.53 K/UL — SIGNIFICANT CHANGE UP (ref 1.8–7.4)
NEUTROPHILS NFR BLD AUTO: 57 % — SIGNIFICANT CHANGE UP (ref 43–77)
NRBC # BLD: 0 /100 WBCS — SIGNIFICANT CHANGE UP (ref 0–0)
PLATELET # BLD AUTO: 101 K/UL — LOW (ref 150–400)
POTASSIUM SERPL-MCNC: 3.3 MMOL/L — LOW (ref 3.5–5.3)
POTASSIUM SERPL-SCNC: 3.3 MMOL/L — LOW (ref 3.5–5.3)
PROT SERPL-MCNC: 6.3 G/DL — SIGNIFICANT CHANGE UP (ref 6–8.3)
PROTHROM AB SERPL-ACNC: 17.4 SEC — HIGH (ref 10.6–13.6)
RBC # BLD: 3.51 M/UL — LOW (ref 3.8–5.2)
RBC # FLD: 16.6 % — HIGH (ref 10.3–14.5)
SODIUM SERPL-SCNC: 142 MMOL/L — SIGNIFICANT CHANGE UP (ref 135–145)
WBC # BLD: 6.2 K/UL — SIGNIFICANT CHANGE UP (ref 3.8–10.5)
WBC # FLD AUTO: 6.2 K/UL — SIGNIFICANT CHANGE UP (ref 3.8–10.5)

## 2021-05-27 PROCEDURE — 85730 THROMBOPLASTIN TIME PARTIAL: CPT

## 2021-05-27 PROCEDURE — 36415 COLL VENOUS BLD VENIPUNCTURE: CPT

## 2021-05-27 PROCEDURE — 85025 COMPLETE CBC W/AUTO DIFF WBC: CPT

## 2021-05-27 PROCEDURE — 99284 EMERGENCY DEPT VISIT MOD MDM: CPT | Mod: 25

## 2021-05-27 PROCEDURE — 99284 EMERGENCY DEPT VISIT MOD MDM: CPT

## 2021-05-27 PROCEDURE — 80053 COMPREHEN METABOLIC PANEL: CPT

## 2021-05-27 PROCEDURE — 85610 PROTHROMBIN TIME: CPT

## 2021-05-27 RX ORDER — SODIUM CHLORIDE 9 MG/ML
500 INJECTION INTRAMUSCULAR; INTRAVENOUS; SUBCUTANEOUS ONCE
Refills: 0 | Status: COMPLETED | OUTPATIENT
Start: 2021-05-27 | End: 2021-05-27

## 2021-05-27 RX ADMIN — SODIUM CHLORIDE 1000 MILLILITER(S): 9 INJECTION INTRAMUSCULAR; INTRAVENOUS; SUBCUTANEOUS at 17:07

## 2021-05-27 RX ADMIN — SODIUM CHLORIDE 500 MILLILITER(S): 9 INJECTION INTRAMUSCULAR; INTRAVENOUS; SUBCUTANEOUS at 18:00

## 2021-05-27 NOTE — ED PROVIDER NOTE - PROGRESS NOTE DETAILS
pt examined by dr jack, no incarcerated hernias, pt tolerating pos.  may be d/c to fu with liver doctors, return for recurrent severe pain or swelling

## 2021-05-27 NOTE — ED ADULT NURSE REASSESSMENT NOTE - NS ED NURSE REASSESS COMMENT FT1
Dr Grover instructed pt to drink clear liquids then if tolerated she will be able to leave.......pt tolerating clear liquids

## 2021-05-27 NOTE — CONSULT NOTE ADULT - ASSESSMENT
Symptomatic right inguinal hernia.  Incidental/ non-symptomatic umbilical hernia.  Pain now fully resolved.  GI complaints now fully resolved with patient already taking liquids in ER.  Exam reassuring with reduced/ reducible hernia(s).  Clearly Child's Class B and more likely compensated C (s/p TIPS.)  At this time, no immediate indication for emergency interventions.  Rather, patient is already linked to a Hepatology team and Surgeon at [facility].  She has been encouraged to return to this ER or nearest ER for deterioration in her condition.  She has been welcomed to transition care to [facility], in a tertiary care setting if more convenient.  Her preference is to return to [facility], which is certainly reasonable.  She and her  agree to call that office tomorrow to coordinate timely outpatient follow-up.  There is no indication now of incarceration presently or intestinal obstruction clinically.  I have no objection to discharge if tolerating clears in ER.  I remain available as needed.  Mrs. Abbott and her  are pleased, agree and are able to verbalize the issues and instructions as outlined above.

## 2021-05-27 NOTE — ED PROVIDER NOTE - NOTES
called to discuss what type of ct to obtain, states will see pt first to determine if imaging necessary

## 2021-05-27 NOTE — CONSULT NOTE ADULT - SUBJECTIVE AND OBJECTIVE BOX
HPI:  Chief Complaint: inguinal hernia.    · Chief Complaint: The patient is a 65y Female complaining of  · HPI Objective Statement: Pt is a 64 yo female hx hep c, cirrhosis, sp tips. patient with hx right inguinal hernia that comes in and out with no pain, this am with 2 hours of swelling, severe pain, and n/v x 2. sx now relieved and no swelling or pain, no f/c.      	pmd: nicole [facility]    liver; [facility]    · Presenting Symptoms: NAUSEA, PAIN, TENDERNESS, swelling  · Negative Findings: no abdominal distension, no blood in stool, no burning urination, no chills, no diarrhea, no dysuria, no fever, no hematuria  · Location: abdominal region   · Laterality: right   · Area: lower   · Radiation: no radiation   · Timing: sudden onset  · Duration: today   · Quality: throbbing  · Severity: SEVERE   · Context: hernia buldging  · Recent Exposure To: none known  · Aggravated Factors: none  · Relieving Factors: time      PAST MEDICAL & SURGICAL HISTORY:  Inguinal hernia    Umbilical hernia    HTN (hypertension)    HLD (hyperlipidemia)    Cirrhosis    Hepatitis C    S/P TIPS (transjugular intrahepatic portosystemic shunt)      REVIEW OF SYSTEMS  General: Denies fever or chills	    Skin/Breast: Denies visible/ palpable lesions  	  Ophthalmologic: Denies vision changes  	  ENMT: Denies naso or oropharyngeal discharge    Respiratory and Thorax: Denies SOB  	  Cardiovascular:	Denies CP    Gastrointestinal:	See HPI    Genitourinary: Denies dysuria    Musculoskeletal:	 See HPI    Neurological: Denies deficits    Psychiatric: Denies anxiety or depression    Hematology/Lymphatics:	 Denies easy bruising or bleeding    Endocrine: Denies heat or cold intolerance    Allergic/Immunologic: Denies anaphylaxis      Allergies  No Known Allergies      SOCIAL HISTORY: , ex-smoker, denies illicit drug use or EtOH abuse      FAMILY HISTORY:  FH: diabetes mellitus (Father)      Vital Signs Last 24 Hrs  T(C): 36.6 (27 May 2021 15:27), Max: 36.6 (27 May 2021 15:27)  T(F): 97.9 (27 May 2021 15:27), Max: 97.9 (27 May 2021 15:27)  HR: 58 (27 May 2021 15:27) (58 - 58)  BP: 108/48 (27 May 2021 15:27) (108/48 - 108/48)  RR: 16 (27 May 2021 15:27) (16 - 16)  SpO2: 100% (27 May 2021 15:27) (100% - 100%)      PHYSICAL EXAM:  Constitutional: appears in NAD, though mildly jaundice    Eyes: Scleral icterus    ENMT: Moist mucosal membranes    Neck: Supple with full ROM    Breasts: Deferred    Back: No CVAT    Respiratory: Equal expansion bilaterally    Cardiovascular: Regular rate and rhythm    Gastrointestinal: Soft and non tense and non tender.  Umbilical hernia soft, fat containing and fully reducible.  Right inguinal hernia present, though fully reduced and only returns on full Valsalva.    Genitourinary: Normal external genitalia.    Rectal: Deferred.    Extremities: Grossly symmetric.    Vascular: Brisk capillary refill.    Neurological: Alert and oriented times three.    Skin: No visible lesions.    Lymph Nodes: No cervical, supraclavicular, axillary or inguinal adenopathy.    Musculoskeletal: No casts, splints or braces.    Psychiatric: Appropriate, pleasant, interactive.      LABS:                        12.3   6.20  )-----------( 101      ( 27 May 2021 17:10 )             36.1     05-27    142  |  107  |  12  ----------------------------<  93  3.3<L>   |  29  |  0.65    Ca    7.9<L>      27 May 2021 17:10    TPro  6.3  /  Alb  2.6<L>  /  TBili  5.2<H>  /  DBili  x   /  AST  106<H>  /  ALT  45  /  AlkPhos  197<H>  05-27    PT/INR - ( 27 May 2021 17:10 )   PT: 17.4 sec;   INR: 1.52 ratio    PTT - ( 27 May 2021 17:10 )  PTT:30.9 sec      RADIOLOGY & ADDITIONAL STUDIES: None pending

## 2021-05-27 NOTE — ED PROVIDER NOTE - PATIENT PORTAL LINK FT
You can access the FollowMyHealth Patient Portal offered by [facility] by registering at the following website: [email or web address]. By joining [facility]’s FollowMyHealth portal, you will also be able to view your health information using other applications (apps) compatible with our system.

## 2021-05-27 NOTE — ED PROVIDER NOTE - CLINICAL SUMMARY MEDICAL DECISION MAKING FREE TEXT BOX
pt with hx cirrhosis and ascites with rlq piain and hernia swollen and painful for 2 hours earlier, n/v. pt now asymptomatic, exam benign abd,  check labs, surg consulted and no need for imaging. tolerating pos. ok for d/c and fu mt. [facility]

## 2021-05-27 NOTE — ED ADULT NURSE NOTE - PMH
Cirrhosis    HLD (hyperlipidemia)    HTN (hypertension)    Inguinal hernia    Umbilical hernia     Cirrhosis    Hepatitis C    HLD (hyperlipidemia)    HTN (hypertension)    Inguinal hernia    Umbilical hernia

## 2021-05-27 NOTE — ED PROVIDER NOTE - OBJECTIVE STATEMENT
Pt is a 66 yo female hx hep c, cirrhosis, sp tips. pt with hx right inguinal hernia that comes in and out with no pain, this am with 2 hours of swelling, severe pain, and n/v x 2. sx now relieved and no swelling or painl, no f/c.      pmd: nicole jorge  liver; [facility]

## 2021-05-27 NOTE — ED ADULT NURSE NOTE - OBJECTIVE STATEMENT
received pt in bed #3b Pt A&O c/o RLQ pain w/ n/v since last night "it might be my hernia". Pt also states her legs have been swollen x 2 years

## 2021-05-27 NOTE — ED PROVIDER NOTE - CARE PLAN
Principal Discharge DX:	Inguinal hernia  Secondary Diagnosis:	Vomiting  Secondary Diagnosis:	Cirrhosis

## 2021-05-27 NOTE — ED PROVIDER NOTE - ENMT, MLM
none
Airway patent, Nasal mucosa clear. Mm dry. Throat has no vesicles, no oropharyngeal exudates and uvula is midline.

## 2021-05-27 NOTE — ED PROVIDER NOTE - PMH
Cirrhosis    Hepatitis C    HLD (hyperlipidemia)    HTN (hypertension)    Inguinal hernia    Umbilical hernia

## 2021-05-27 NOTE — ED PROVIDER NOTE - PSYCHIATRIC, MLM
Alert and oriented to person, place, time/situation. normal mood and affect. no apparent risk to self or others. no asterixis

## 2021-09-05 ENCOUNTER — EMERGENCY (EMERGENCY)
Facility: HOSPITAL | Age: 66
LOS: 1 days | Discharge: ANOTHER HOSPITAL | End: 2021-09-05
Attending: EMERGENCY MEDICINE | Admitting: STUDENT IN AN ORGANIZED HEALTH CARE EDUCATION/TRAINING PROGRAM
Payer: COMMERCIAL

## 2021-09-05 ENCOUNTER — INPATIENT (INPATIENT)
Facility: HOSPITAL | Age: 66
LOS: 0 days | Discharge: HOME | DRG: 395 | End: 2021-09-06
Attending: SURGERY | Admitting: SURGERY
Payer: COMMERCIAL

## 2021-09-05 VITALS
HEART RATE: 61 BPM | RESPIRATION RATE: 15 BRPM | WEIGHT: 164.91 LBS | DIASTOLIC BLOOD PRESSURE: 52 MMHG | TEMPERATURE: 98 F | SYSTOLIC BLOOD PRESSURE: 96 MMHG | OXYGEN SATURATION: 99 % | HEIGHT: 67 IN

## 2021-09-05 VITALS
OXYGEN SATURATION: 96 % | SYSTOLIC BLOOD PRESSURE: 96 MMHG | RESPIRATION RATE: 16 BRPM | TEMPERATURE: 97 F | HEART RATE: 54 BPM | HEIGHT: 67 IN | DIASTOLIC BLOOD PRESSURE: 60 MMHG

## 2021-09-05 DIAGNOSIS — K40.30 UNILATERAL INGUINAL HERNIA, WITH OBSTRUCTION, WITHOUT GANGRENE, NOT SPECIFIED AS RECURRENT: ICD-10-CM

## 2021-09-05 DIAGNOSIS — Z95.828 PRESENCE OF OTHER VASCULAR IMPLANTS AND GRAFTS: Chronic | ICD-10-CM

## 2021-09-05 DIAGNOSIS — K46.0 UNSPECIFIED ABDOMINAL HERNIA WITH OBSTRUCTION, WITHOUT GANGRENE: ICD-10-CM

## 2021-09-05 PROBLEM — B19.20 UNSPECIFIED VIRAL HEPATITIS C WITHOUT HEPATIC COMA: Chronic | Status: ACTIVE | Noted: 2021-05-27

## 2021-09-05 LAB
ALBUMIN SERPL ELPH-MCNC: 2.2 G/DL — LOW (ref 3.3–5)
ALP SERPL-CCNC: 232 U/L — HIGH (ref 40–120)
ALT FLD-CCNC: 40 U/L — SIGNIFICANT CHANGE UP (ref 12–78)
ANION GAP SERPL CALC-SCNC: 6 MMOL/L — SIGNIFICANT CHANGE UP (ref 5–17)
APTT BLD: 26.3 SEC — LOW (ref 27.5–35.5)
AST SERPL-CCNC: 77 U/L — HIGH (ref 15–37)
BASOPHILS # BLD AUTO: 0 K/UL — SIGNIFICANT CHANGE UP (ref 0–0.2)
BASOPHILS NFR BLD AUTO: 0 % — SIGNIFICANT CHANGE UP (ref 0–2)
BILIRUB SERPL-MCNC: 4.1 MG/DL — HIGH (ref 0.2–1.2)
BLD GP AB SCN SERPL QL: NEGATIVE — SIGNIFICANT CHANGE UP
BUN SERPL-MCNC: 14 MG/DL — SIGNIFICANT CHANGE UP (ref 7–23)
CALCIUM SERPL-MCNC: 8.3 MG/DL — LOW (ref 8.5–10.1)
CHLORIDE SERPL-SCNC: 99 MMOL/L — SIGNIFICANT CHANGE UP (ref 96–108)
CO2 SERPL-SCNC: 31 MMOL/L — SIGNIFICANT CHANGE UP (ref 22–31)
CREAT SERPL-MCNC: 1.2 MG/DL — SIGNIFICANT CHANGE UP (ref 0.5–1.3)
EOSINOPHIL # BLD AUTO: 0.12 K/UL — SIGNIFICANT CHANGE UP (ref 0–0.5)
EOSINOPHIL NFR BLD AUTO: 2 % — SIGNIFICANT CHANGE UP (ref 0–6)
GLUCOSE SERPL-MCNC: 124 MG/DL — HIGH (ref 70–99)
HCT VFR BLD CALC: 34.4 % — LOW (ref 34.5–45)
HGB BLD-MCNC: 12.5 G/DL — SIGNIFICANT CHANGE UP (ref 11.5–15.5)
INR BLD: 1.57 RATIO — HIGH (ref 0.88–1.16)
LACTATE SERPL-SCNC: 1.5 MMOL/L — SIGNIFICANT CHANGE UP (ref 0.7–2)
LIDOCAIN IGE QN: 338 U/L — SIGNIFICANT CHANGE UP (ref 73–393)
LYMPHOCYTES # BLD AUTO: 1.54 K/UL — SIGNIFICANT CHANGE UP (ref 1–3.3)
LYMPHOCYTES # BLD AUTO: 25 % — SIGNIFICANT CHANGE UP (ref 13–44)
MCHC RBC-ENTMCNC: 36.3 GM/DL — HIGH (ref 32–36)
MCHC RBC-ENTMCNC: 36.5 PG — HIGH (ref 27–34)
MCV RBC AUTO: 100.6 FL — HIGH (ref 80–100)
MONOCYTES # BLD AUTO: 1.35 K/UL — HIGH (ref 0–0.9)
MONOCYTES NFR BLD AUTO: 22 % — HIGH (ref 2–14)
NEUTROPHILS # BLD AUTO: 3.14 K/UL — SIGNIFICANT CHANGE UP (ref 1.8–7.4)
NEUTROPHILS NFR BLD AUTO: 51 % — SIGNIFICANT CHANGE UP (ref 43–77)
NRBC # BLD: SIGNIFICANT CHANGE UP /100 WBCS (ref 0–0)
PLATELET # BLD AUTO: 84 K/UL — LOW (ref 150–400)
POTASSIUM SERPL-MCNC: 3.3 MMOL/L — LOW (ref 3.5–5.3)
POTASSIUM SERPL-SCNC: 3.3 MMOL/L — LOW (ref 3.5–5.3)
PROT SERPL-MCNC: 6.3 G/DL — SIGNIFICANT CHANGE UP (ref 6–8.3)
PROTHROM AB SERPL-ACNC: 18 SEC — HIGH (ref 10.6–13.6)
RBC # BLD: 3.42 M/UL — LOW (ref 3.8–5.2)
RBC # FLD: 15.8 % — HIGH (ref 10.3–14.5)
RH IG SCN BLD-IMP: POSITIVE — SIGNIFICANT CHANGE UP
SARS-COV-2 RNA SPEC QL NAA+PROBE: SIGNIFICANT CHANGE UP
SODIUM SERPL-SCNC: 136 MMOL/L — SIGNIFICANT CHANGE UP (ref 135–145)
WBC # BLD: 6.15 K/UL — SIGNIFICANT CHANGE UP (ref 3.8–10.5)
WBC # FLD AUTO: 6.15 K/UL — SIGNIFICANT CHANGE UP (ref 3.8–10.5)

## 2021-09-05 PROCEDURE — 85025 COMPLETE CBC W/AUTO DIFF WBC: CPT

## 2021-09-05 PROCEDURE — 86850 RBC ANTIBODY SCREEN: CPT

## 2021-09-05 PROCEDURE — 83690 ASSAY OF LIPASE: CPT

## 2021-09-05 PROCEDURE — 80053 COMPREHEN METABOLIC PANEL: CPT

## 2021-09-05 PROCEDURE — 96374 THER/PROPH/DIAG INJ IV PUSH: CPT

## 2021-09-05 PROCEDURE — 86901 BLOOD TYPING SEROLOGIC RH(D): CPT

## 2021-09-05 PROCEDURE — 96375 TX/PRO/DX INJ NEW DRUG ADDON: CPT

## 2021-09-05 PROCEDURE — 74176 CT ABD & PELVIS W/O CONTRAST: CPT | Mod: MA

## 2021-09-05 PROCEDURE — 93010 ELECTROCARDIOGRAM REPORT: CPT

## 2021-09-05 PROCEDURE — 99285 EMERGENCY DEPT VISIT HI MDM: CPT

## 2021-09-05 PROCEDURE — 86900 BLOOD TYPING SEROLOGIC ABO: CPT

## 2021-09-05 PROCEDURE — 85610 PROTHROMBIN TIME: CPT

## 2021-09-05 PROCEDURE — 99285 EMERGENCY DEPT VISIT HI MDM: CPT | Mod: 25

## 2021-09-05 PROCEDURE — U0005: CPT

## 2021-09-05 PROCEDURE — 93005 ELECTROCARDIOGRAM TRACING: CPT

## 2021-09-05 PROCEDURE — U0003: CPT

## 2021-09-05 PROCEDURE — 36415 COLL VENOUS BLD VENIPUNCTURE: CPT

## 2021-09-05 PROCEDURE — 85730 THROMBOPLASTIN TIME PARTIAL: CPT

## 2021-09-05 PROCEDURE — 83605 ASSAY OF LACTIC ACID: CPT

## 2021-09-05 PROCEDURE — 74176 CT ABD & PELVIS W/O CONTRAST: CPT | Mod: 26,MA

## 2021-09-05 RX ORDER — ENOXAPARIN SODIUM 100 MG/ML
40 INJECTION SUBCUTANEOUS DAILY
Refills: 0 | Status: DISCONTINUED | OUTPATIENT
Start: 2021-09-05 | End: 2021-09-06

## 2021-09-05 RX ORDER — SODIUM CHLORIDE 9 MG/ML
1000 INJECTION INTRAMUSCULAR; INTRAVENOUS; SUBCUTANEOUS ONCE
Refills: 0 | Status: COMPLETED | OUTPATIENT
Start: 2021-09-05 | End: 2021-09-05

## 2021-09-05 RX ORDER — ZOLPIDEM TARTRATE 10 MG/1
1 TABLET ORAL
Qty: 0 | Refills: 0 | DISCHARGE

## 2021-09-05 RX ORDER — SODIUM CHLORIDE 9 MG/ML
1000 INJECTION, SOLUTION INTRAVENOUS ONCE
Refills: 0 | Status: COMPLETED | OUTPATIENT
Start: 2021-09-05 | End: 2021-09-05

## 2021-09-05 RX ORDER — SPIRONOLACTONE 25 MG/1
50 TABLET, FILM COATED ORAL DAILY
Refills: 0 | Status: DISCONTINUED | OUTPATIENT
Start: 2021-09-05 | End: 2021-09-06

## 2021-09-05 RX ORDER — FUROSEMIDE 40 MG
20 TABLET ORAL DAILY
Refills: 0 | Status: DISCONTINUED | OUTPATIENT
Start: 2021-09-05 | End: 2021-09-06

## 2021-09-05 RX ORDER — LACTULOSE 10 G/15ML
10 SOLUTION ORAL DAILY
Refills: 0 | Status: DISCONTINUED | OUTPATIENT
Start: 2021-09-05 | End: 2021-09-06

## 2021-09-05 RX ORDER — PROPRANOLOL HCL 160 MG
60 CAPSULE, EXTENDED RELEASE 24HR ORAL DAILY
Refills: 0 | Status: DISCONTINUED | OUTPATIENT
Start: 2021-09-05 | End: 2021-09-06

## 2021-09-05 RX ORDER — ONDANSETRON 8 MG/1
4 TABLET, FILM COATED ORAL ONCE
Refills: 0 | Status: COMPLETED | OUTPATIENT
Start: 2021-09-05 | End: 2021-09-05

## 2021-09-05 RX ORDER — MORPHINE SULFATE 50 MG/1
4 CAPSULE, EXTENDED RELEASE ORAL ONCE
Refills: 0 | Status: DISCONTINUED | OUTPATIENT
Start: 2021-09-05 | End: 2021-09-05

## 2021-09-05 RX ADMIN — Medication 20 MILLIGRAM(S): at 18:33

## 2021-09-05 RX ADMIN — SODIUM CHLORIDE 1000 MILLILITER(S): 9 INJECTION, SOLUTION INTRAVENOUS at 12:45

## 2021-09-05 RX ADMIN — MORPHINE SULFATE 4 MILLIGRAM(S): 50 CAPSULE, EXTENDED RELEASE ORAL at 04:10

## 2021-09-05 RX ADMIN — ENOXAPARIN SODIUM 40 MILLIGRAM(S): 100 INJECTION SUBCUTANEOUS at 18:32

## 2021-09-05 RX ADMIN — SODIUM CHLORIDE 1000 MILLILITER(S): 9 INJECTION INTRAMUSCULAR; INTRAVENOUS; SUBCUTANEOUS at 04:10

## 2021-09-05 RX ADMIN — MORPHINE SULFATE 4 MILLIGRAM(S): 50 CAPSULE, EXTENDED RELEASE ORAL at 03:49

## 2021-09-05 RX ADMIN — ONDANSETRON 4 MILLIGRAM(S): 8 TABLET, FILM COATED ORAL at 03:49

## 2021-09-05 RX ADMIN — LACTULOSE 10 GRAM(S): 10 SOLUTION ORAL at 18:31

## 2021-09-05 NOTE — CONSULT NOTE ADULT - SUBJECTIVE AND OBJECTIVE BOX
HPI: 65 YO Female w/ PMHx of s/p umbilical hernia repair (2/2021 [facility]), s/p TIPS procedure (2/2021, [facility]) p/w R groin pain since last night. Pain is severe, worsening, and radiating to the back. Pt states that she has had this hernia for years, but it has never caused pain like this before. Last BM was last night around 7pm and was more liquid than usual. Last meal was last night at 7/8pm. Denies fever, chills, chest pain, SOB, nausea, vomiting, diarrhea, constipation, hematemesis or hematochezia. Received Pfizer COVID-19 vaccine x2.     PAST MEDICAL & SURGICAL HISTORY:  Inguinal hernia    Umbilical hernia    HTN (hypertension)    HLD (hyperlipidemia)    Cirrhosis    Hepatitis C    S/P TIPS (transjugular intrahepatic portosystemic shunt)    REVIEW OF SYSTEMS:    CONSTITUTIONAL: No weakness, fevers or chills  EYES/ENT: No visual changes;  No vertigo or throat pain   NECK: No pain or stiffness  RESPIRATORY: No cough, wheezing, hemoptysis; No shortness of breath  CARDIOVASCULAR: No chest pain or palpitations  GASTROINTESTINAL: See HPI  GENITOURINARY: No dysuria, frequency or hematuria  NEUROLOGICAL: No numbness or weakness  SKIN: No itching, burning, rashes, or lesions   All other review of systems is negative unless indicated above.    MEDICATIONS  (STANDING):    MEDICATIONS  (PRN):    Allergies    No Known Allergies    Intolerances    SOCIAL HISTORY:  Current smoker, 3 cigarettes per day.  No ETOH or illicit drug use    FAMILY HISTORY:  FH: diabetes mellitus (Father)    Vital Signs Last 24 Hrs  T(C): 36.6 (05 Sep 2021 03:15), Max: 36.6 (05 Sep 2021 03:15)  T(F): 97.9 (05 Sep 2021 03:15), Max: 97.9 (05 Sep 2021 03:15)  HR: 61 (05 Sep 2021 03:15) (61 - 61)  BP: 96/52 (05 Sep 2021 03:15) (96/52 - 96/52)  BP(mean): --  RR: 15 (05 Sep 2021 03:15) (15 - 15)  SpO2: 99% (05 Sep 2021 03:15) (99% - 99%)    PHYSICAL EXAM  GENERAL:  Well-nourished, well-developed Female lying comfortably in bed in NAD  HEENT:  Sclera white. Mucous membranes moist.  ABDOMEN:  Soft, nondistended, mild ttp of RLQ  GROIN: Large, soft right inguinal hernia present, +ttp, irreducible  NEURO:  A&O x 3    LABS:                        12.5   6.15  )-----------( 84       ( 05 Sep 2021 03:48 )             34.4     09-05    136  |  99  |  14  ----------------------------<  124<H>  3.3<L>   |  31  |  1.20    Ca    8.3<L>      05 Sep 2021 03:48    TPro  6.3  /  Alb  2.2<L>  /  TBili  4.1<H>  /  DBili  x   /  AST  77<H>  /  ALT  40  /  AlkPhos  232<H>  09-05    PT/INR - ( 05 Sep 2021 03:48 )   PT: 18.0 sec;   INR: 1.57 ratio      PTT - ( 05 Sep 2021 03:48 )  PTT:26.3 sec    RADIOLOGY & ADDITIONAL STUDIES:  < from: CT Abdomen and Pelvis No Cont (09.05.21 @ 04:14) >  FINDINGS:  LOWER CHEST: Bibasilar subsegmental atelectasis. Cardiomegaly. Heavy mitral valve annulus calcifications.    LIVER: Cirrhosis status post TIPS procedure.  BILE DUCTS: Normal caliber.  GALLBLADDER: Cholelithiasis.  SPLEEN: Within normal limits.  PANCREAS: Within normal limits.  ADRENALS: Within normal limits.  KIDNEYS/URETERS: Within normal limits.    BLADDER: Within normal limits.  REPRODUCTIVE ORGANS: Uterus and adnexa within normal limits.    BOWEL: Moderate-sized right inguinal hernia containing the cecum and small amount of free fluid. Small hiatal hernia. No bowel obstruction. Scattered colonic diverticulosis without diverticulitis. Appendix is normal.  PERITONEUM: No ascites.  VESSELS: Sclerosed umbilical varices.  RETROPERITONEUM/LYMPH NODES: No lymphadenopathy.  ABDOMINAL WALL: Moderate-sized right inguinal hernia containing the cecum and a small amount of free fluid with mild adjacent inflammatory change.  BONES: Degenerative changes of the spine.    IMPRESSION:  Moderate-sized right inguinal hernia containing the cecum and a small amount of free fluid. Correlate with physical examination for evidence of incarceration.

## 2021-09-05 NOTE — H&P ADULT - NSHPPHYSICALEXAM_GEN_ALL_CORE
Physical Exam  T(C): 36.3  HR: 50 (50 - 61)  BP: 85/68 (85/68 - 96/60)  RR: 16 (15 - 16)  SpO2: 96% (96% - 99%)  Tmax: T(C): , Max: 36.6 (09-05-21 @ 03:15)    General: well developed, well nourished, NAD  Neuro: alert and oriented, no focal deficits, moves all extremities spontaneously  HEENT: NCAT, EOMI, anicteric, mucosa moist  Respiratory: airway patent, respirations unlabored  CVS: regular rate and rhythm  Abdomen: soft, nontender, nondistended, right inguinal hernia irreducible   Extremities: no edema, sensation and movement grossly intact  Skin: warm, dry, appropriate color

## 2021-09-05 NOTE — ED PROVIDER NOTE - OBJECTIVE STATEMENT
66F w/ PMHx of cirrhosis s/p TIPS BIBEMS as a xfer from [place] for incarcerated hernia.  Pt. presented to OSH last night w/ abd pain.  CT showed evidence of incarcerated hernia.  Per progress note on the ED provider note, surgery preferred xfer to [facility] for hepatology as pt. had tips after cirrhosis and has hep c.  Per EMS, pt. received morphine ~4am, BP was in the 90s systolic throughout the ED visit.  Pt. states her BP is usually on the lower side.  Denies any f/c, sick contacts.

## 2021-09-05 NOTE — ED PROVIDER NOTE - CLINICAL SUMMARY MEDICAL DECISION MAKING FREE TEXT BOX
66F BIBEMS as a xfer from OSH for incarcerated hernia.  Will consult surgery, no indication for labs currently but will obtain 66F SCAR as a xfer from OSH for incarcerated hernia.  Will consult surgery, obtain two T&S, administer analgesics if necessary, reassess, and dispo pending results and consultant recs. 66F BIBEMS as a xfer from OSH for incarcerated hernia.  Abd NT here, likely 2/2 analgesia.  Will consult surgery, obtain two T&S, administer analgesics if necessary, reassess, and dispo pending results and consultant recs.

## 2021-09-05 NOTE — CONSULT NOTE ADULT - ASSESSMENT
67y/o hx of hep C with cirrhosis s/p TIPs presenting right incarcerated inguinal hernia, likely chronic     -  no acute surgical intervention at this time   - pain improved   - pt. without signs of obstruction   - dispo per ED     Discussed with attending   [id number]  67y/o hx of hep C with cirrhosis s/p TIPs presenting right incarcerated inguinal hernia, likely chronic

## 2021-09-05 NOTE — ED PROVIDER NOTE - ATTENDING CONTRIBUTION TO CARE
PMD can't recall  66y female pmh Hep C/Cirrhosis,Sp Tips procedure [place], HTN,HLD, PT was seen at [place] for c/o pain onset last night. Dx as incarcerated hernia. Transferred to [facility] No nv, fc, sob, cp.Pain now 8/10 PE Adult female awake alert ncat neck supple chest clear ap cs no rgm abd Rt inguinale mass c/w hernia. Neuro no focal defect cv no rubs, gallops or murmurs.   Yao Young MD, [facility]

## 2021-09-05 NOTE — H&P ADULT - HISTORY OF PRESENT ILLNESS
66F w/ PMHx of cirrhosis s/p TIPS BIBEMS as a transfer from [place] for incarcerated inguinal hernia.  Pt. presented to OSH last night w/ abd pain. Patient has known history of right sided inguinal hernia that she reports normally always bulges out. Usually she does not have much pain at the site. however, this morning noticed bulge  was larger and has pain so she presented to the ED.  CT showed evidence of hernia containing colon. Patient had similar findings on previous CT scan in 1/2021. Currently denies any nausea or vomiting. Tolerating PO. Passing flatus and having BM.  Per progress note on the ED provider note, surgery preferred transfer to [facility] due to history of cirrhosis and previous TIPS procedure, has hep c.  Per EMS, pt. received morphine ~4am, BP was in the 90s systolic throughout the ED visit.  Pt. states her BP is usually on the lower side. Currently pain improved.  Denies any chest pain, fevers, chills, SOB, n/v, diarrhea, constipation, recent trauma or sick contacts.

## 2021-09-05 NOTE — ED ADULT NURSE NOTE - OBJECTIVE STATEMENT
Patient came to ED d/t right groin pain (hernia unable to be reduced), pain rated 9 out of 10 at this time. Patient denies any other complaints at this time.

## 2021-09-05 NOTE — ED PROVIDER NOTE - PROGRESS NOTE DETAILS
left message/consult for surgery PA who is in OR seen by surgery PA spoke with dr jenkins, wants pt transferred to [facility] which has hepatology given pt cirrhosis sp tips and hep c, pt accpeted by surgery dr senior

## 2021-09-05 NOTE — ED ADULT TRIAGE NOTE - CHIEF COMPLAINT QUOTE
TX [place] for incarcerated R inguinal hernia   surgical team did not feel comfortable taking pt to OR in [place]

## 2021-09-05 NOTE — ED PROVIDER NOTE - NS ED ROS FT
General: denies fever, chills, weight loss/weight gain.  HENT: denies nasal congestion, sore throat, rhinorrhea, ear pain.  Eyes: denies visual changes, blurred vision, eye discharge, eye redness.  Neck: denies neck pain, neck swelling.  CV: denies chest pain, palpitations.  Resp: denies difficulty breathing, cough.  Abdominal: +abd pain.  MSK: denies muscle aches, bony pain, leg pain, leg swelling.  Neuro: denies headaches, numbness, tingling, dizziness, lightheadedness.  Skin: denies rashes, cuts, bruises.  Hematologic: denies unexplained bruises.

## 2021-09-05 NOTE — ED PROVIDER NOTE - OBJECTIVE STATEMENT
67 yo female hx of inguinal hernia c/o right inguinal hernia pain radiating to her back since last night, moderate to severe, no medications taken.  No fever/chills.

## 2021-09-05 NOTE — ED PROVIDER NOTE - PROGRESS NOTE DETAILS
Jam PGY3 - Surgery paged x3 w/o call back.  Will call attg if no response in 10 minutes. Jam PGY3 - Spoke w/ surgery resident and endorsed the story.  Surg attg Neema was made aware of the story by EM floyd Young. Jam PGY3 - Surgery recommends placing heat pack.  Still deciding on whether or not to admit pt. Jam PGY3 - Confirmed w/ pt.'s daughter at bedside that pt.'s blood pressure is usually in the 90s systolic. Jam PGY3 - Pt. evaluated by Dr. Bethea and accepted to his service.

## 2021-09-05 NOTE — ED ADULT NURSE NOTE - NSICDXPASTMEDICALHX_GEN_ALL_CORE_FT
PAST MEDICAL HISTORY:  Cirrhosis     Hepatitis C     HLD (hyperlipidemia)     HTN (hypertension)     Inguinal hernia     Umbilical hernia

## 2021-09-05 NOTE — ED PROVIDER NOTE - PHYSICAL EXAMINATION
Gen: Alert, NAD  Head/eyes: NC/AT, PERRL  ENT: airway patent  Neck: supple, no tenderness/meningismus/JVD, Trachea midline  Pulm/lung: Bilateral clear BS, normal resp effort, no wheeze/stridor/retractions  CV/heart: RRR, no M/R/G, +2 dist pulses (radial, pedal DP/PT, popliteal)  GI/Abd: soft, NT/ND, +BS, no guarding/rebound tenderness, +irreducible right inguinal hernia  Musculoskeletal: no edema/erythema/cyanosis, FROM in all extremities, no C/T/L spine ttp  Skin: no rash, no vesicles, no petechaie, no ecchymosis, no swelling  Neuro: AAOx3, CN 2-12 intact, normal sensation, 5/5 motor strength in all extremities, normal gait

## 2021-09-05 NOTE — ED ADULT NURSE NOTE - NSIMPLEMENTINTERV_GEN_ALL_ED
Implemented All Fall Risk Interventions:  [place] to call system. Call bell, personal items and telephone within reach. Instruct patient to call for assistance. Room bathroom lighting operational. Non-slip footwear when patient is off stretcher. Physically safe environment: no spills, clutter or unnecessary equipment. Stretcher in lowest position, wheels locked, appropriate side rails in place. Provide visual cue, wrist band, yellow gown, etc. Monitor gait and stability. Monitor for mental status changes and reorient to person, place, and time. Review medications for side effects contributing to fall risk. Reinforce activity limits and safety measures with patient and family.

## 2021-09-05 NOTE — CONSULT NOTE ADULT - SUBJECTIVE AND OBJECTIVE BOX
CC: 66y old Female admitted with a chief complaint of , now    HPI:  66F w/ PMHx of cirrhosis s/p TIPS BIBEMS as a transfer from [place] for incarcerated inguinal hernia.  Pt. presented to OSH last night w/ abd pain. Patient has known history of right sided inguinal hernia that she reports normally always bulges out. Usually she does not have much pain at the site. however, this morning noticed bulge  was larger and has pain so she presented to the ED.  CT showed evidence of hernia containing colon. Patient had similar findings on previous CT scan in 1/2021. Currently denies any nausea or vomiting. Tolerating PO. Passing flatus and having BM.  Per progress note on the ED provider note, surgery preferred transfer to [facility] due to history of cirrhosis and previous TIPS procedure, has hep c.  Per EMS, pt. received morphine ~4am, BP was in the 90s systolic throughout the ED visit.  Pt. states her BP is usually on the lower side. Currently pain improved.  Denies any chest pain, fevers, chills, SOB, n/v, diarrhea, constipation, recent trauma or sick contacts.      PMHx: Inguinal hernia    Umbilical hernia    HTN (hypertension)    HLD (hyperlipidemia)    Cirrhosis    Hepatitis C      PSHx: No significant past surgical history    S/P TIPS (transjugular intrahepatic portosystemic shunt)      Medications (inpatient):   Medications (PRN):  Allergies: No Known Allergies  (Intolerances: )  Social Hx:   Family Hx: FH: diabetes mellitus (Father)        Physical Exam  T(C): 36.3  HR: 50 (50 - 61)  BP: 85/68 (85/68 - 96/60)  RR: 16 (15 - 16)  SpO2: 96% (96% - 99%)  Tmax: T(C): , Max: 36.6 (09-05-21 @ 03:15)    General: well developed, well nourished, NAD  Neuro: alert and oriented, no focal deficits, moves all extremities spontaneously  HEENT: NCAT, EOMI, anicteric, mucosa moist  Respiratory: airway patent, respirations unlabored  CVS: regular rate and rhythm  Abdomen: soft, nontender, nondistended  right inguinal hernia irreducible   Extremities: no edema, sensation and movement grossly intact  Skin: warm, dry, appropriate color    Labs:                        12.5   6.15  )-----------( 84       ( 05 Sep 2021 03:48 )             34.4     PT/INR - ( 05 Sep 2021 03:48 )   PT: 18.0 sec;   INR: 1.57 ratio         PTT - ( 05 Sep 2021 03:48 )  PTT:26.3 sec  09-05    136  |  99  |  14  ----------------------------<  124<H>  3.3<L>   |  31  |  1.20    Ca    8.3<L>      05 Sep 2021 03:48    TPro  6.3  /  Alb  2.2<L>  /  TBili  4.1<H>  /  DBili  x   /  AST  77<H>  /  ALT  40  /  AlkPhos  232<H>  09-05            Imaging and other studies:  < from: CT Abdomen and Pelvis No Cont (09.05.21 @ 04:14) >  FINDINGS:  LOWER CHEST: Bibasilar subsegmental atelectasis. Cardiomegaly. Heavy mitral valve annulus calcifications.    LIVER: Cirrhosis status post TIPS procedure.  BILE DUCTS: Normal caliber.  GALLBLADDER: Cholelithiasis.  SPLEEN: Within normal limits.  PANCREAS: Within normal limits.  ADRENALS: Within normal limits.  KIDNEYS/URETERS: Within normal limits.    BLADDER: Within normal limits.  REPRODUCTIVE ORGANS: Uterus and adnexa within normal limits.    BOWEL: Moderate-sized right inguinal hernia containing the cecum and small amount of free fluid. Small hiatal hernia. No bowel obstruction. Scattered colonic diverticulosis without diverticulitis. Appendix is normal.  PERITONEUM: No ascites.  VESSELS: Sclerosed umbilical varices.  RETROPERITONEUM/LYMPH NODES: No lymphadenopathy.  ABDOMINAL WALL: Moderate-sized right inguinal hernia containing the cecum and a small amount of free fluid with mild adjacent inflammatory change.  BONES: Degenerative changes of the spine.    IMPRESSION:  Moderate-sized right inguinal hernia containing the cecum and a small amount of free fluid. Correlate with physical examination for evidence of incarceration.

## 2021-09-05 NOTE — ED ADULT NURSE NOTE - CHIEF COMPLAINT QUOTE
TX [place] for incarcerated R inguinal hernia   surgical team did not feel comfortable taking pt to OR in [place]

## 2021-09-05 NOTE — ED ADULT TRIAGE NOTE - CHIEF COMPLAINT QUOTE
Pt came to ED c.o right groin pain that radiates to back.  Pt states she has an hernia in the right groin.

## 2021-09-05 NOTE — ED ADULT NURSE NOTE - EXTENSIONS OF SELF_ADULT
----- Message from Nidia Brown sent at 11/13/2020  3:23 PM CST -----  Contact: [phone]  Would like to consult with the nurse, patient is returning the nurse call, please call back  thanks sj   .Type:  Patient Returning Call    Who Called: ms rascon  Who Left Message for Patient:  Does the patient know what this is regarding?:  Would the patient rather a call back or a response via [facility]? callback  Best Call Back Number:[phone]  Additional Information:       
Called pt but no answer    
Called pt no answer   
I've reviewed chart and not see x-ray for knee; so, I'm not sure if she has arthritis.  Would she like consult/referral to see Ortho?  
Ortho referral in. Thanks.  
Pt said she is ok with seeing ortho   
States she was having trouble with right knee  Had been treated for this problem in the past  Wanted to know if it was arthritis or collegan   Just wanted more information about this   
None

## 2021-09-05 NOTE — ED ADULT NURSE NOTE - OBJECTIVE STATEMENT
66 year old female A&OX4 with a past medical history of Hepatitis C, HTN, HLD, SHX of transferred from [facility] where she presented with abdominal pain and was found to have an incarcerated hernia, sent for further surgical evaluation. Prior to arrival, patient was noted to be hypotensive which she states is chronic. She endorses some slight abdominal pain but has since improved after morphine. She is not tender to palpation. She does appear jaundice on exam and icterus noted bilaterally. She denies fevers, chills, chest pain, shortness of breath, palpitations, bloody bowel movement.

## 2021-09-05 NOTE — CONSULT NOTE ADULT - PROBLEM SELECTOR RECOMMENDATION 9
67 YO Female w/ PMHx of s/p umbilical hernia repair (2/2021 [facility]), s/p TIPS procedure (2/2021, [facility]) p/w R groin pain since last night. CT significant for moderate-sized right inguinal hernia containing the cecum and small amount of free fluid. WBC & lactate normal. Pt afebrile.  - Given h/o TIPS as well as presence of cirrhosis & elevated t.bili, would recommend transfer to tertiary facility with liver team for further work-up and treatment of incarcerated inguinal hernia as patient is higher risk for morbidity/mortality.   - Case discussed with Dr. Dennis

## 2021-09-05 NOTE — ED PROVIDER NOTE - PHYSICAL EXAMINATION
GENERAL: Patient awake alert NAD.  HEENT: NC/AT, Moist mucous membranes.  LUNGS: CTAB, no wheezes or crackles.  CARDIAC: RRR, no m/r/g.  ABDOMEN: Soft, NT, ND, No rebound, guarding.  EXT: No edema. No calf tenderness.  MSK: No spinal tenderness, no pain with movement, no deformities.  NEURO: A&Ox3. Moving all extremities.  SKIN: Warm and dry. No rash.  PSYCH: Normal affect.

## 2021-09-05 NOTE — H&P ADULT - ASSESSMENT
67y/o hx of hep C with cirrhosis s/p TIPs presenting right incarcerated inguinal hernia, likely chronic       - no acute surgical intervention at this time   - pt, without clinical signs of obstruction  - reg. diet,   - pt. preferred to be observed overnight       Patient seen and examined with attending Dr. Bethea  [phone]

## 2021-09-05 NOTE — H&P ADULT - NSHPLABSRESULTS_GEN_ALL_CORE
Labs:                        12.5   6.15  )-----------( 84       ( 05 Sep 2021 03:48 )             34.4     PT/INR - ( 05 Sep 2021 03:48 )   PT: 18.0 sec;   INR: 1.57 ratio         PTT - ( 05 Sep 2021 03:48 )  PTT:26.3 sec  09-05    136  |  99  |  14  ----------------------------<  124<H>  3.3<L>   |  31  |  1.20    Ca    8.3<L>      05 Sep 2021 03:48    TPro  6.3  /  Alb  2.2<L>  /  TBili  4.1<H>  /  DBili  x   /  AST  77<H>  /  ALT  40  /  AlkPhos  232<H>  09-05            Imaging and other studies:  CT< from: CT Abdomen and Pelvis No Cont (09.05.21 @ 04:14) >    FINDINGS:  LOWER CHEST: Bibasilar subsegmental atelectasis. Cardiomegaly. Heavy mitral valve annulus calcifications.    LIVER: Cirrhosis status post TIPS procedure.  BILE DUCTS: Normal caliber.  GALLBLADDER: Cholelithiasis.  SPLEEN: Within normal limits.  PANCREAS: Within normal limits.  ADRENALS: Within normal limits.  KIDNEYS/URETERS: Within normal limits.    BLADDER: Within normal limits.  REPRODUCTIVE ORGANS: Uterus and adnexa within normal limits.    BOWEL: Moderate-sized right inguinal hernia containing the cecum and small amount of free fluid. Small hiatal hernia. No bowel obstruction. Scattered colonic diverticulosis without diverticulitis. Appendix is normal.  PERITONEUM: No ascites.  VESSELS: Sclerosed umbilical varices.  RETROPERITONEUM/LYMPH NODES: No lymphadenopathy.  ABDOMINAL WALL: Moderate-sized right inguinal hernia containing the cecum and a small amount of free fluid with mild adjacent inflammatory change.  BONES: Degenerative changes of the spine.    IMPRESSION:  Moderate-sized right inguinal hernia containing the cecum and a small amount of free fluid. Correlate with physical examination for evidence of incarceration.

## 2021-09-06 ENCOUNTER — TRANSCRIPTION ENCOUNTER (OUTPATIENT)
Age: 66
End: 2021-09-06

## 2021-09-06 VITALS
DIASTOLIC BLOOD PRESSURE: 68 MMHG | HEART RATE: 55 BPM | RESPIRATION RATE: 18 BRPM | SYSTOLIC BLOOD PRESSURE: 113 MMHG | OXYGEN SATURATION: 95 % | TEMPERATURE: 98 F

## 2021-09-06 LAB
ANION GAP SERPL CALC-SCNC: 11 MMOL/L — SIGNIFICANT CHANGE UP (ref 5–17)
BUN SERPL-MCNC: 13 MG/DL — SIGNIFICANT CHANGE UP (ref 7–23)
CALCIUM SERPL-MCNC: 9 MG/DL — SIGNIFICANT CHANGE UP (ref 8.4–10.5)
CHLORIDE SERPL-SCNC: 102 MMOL/L — SIGNIFICANT CHANGE UP (ref 96–108)
CO2 SERPL-SCNC: 25 MMOL/L — SIGNIFICANT CHANGE UP (ref 22–31)
COVID-19 SPIKE DOMAIN AB INTERP: POSITIVE
COVID-19 SPIKE DOMAIN ANTIBODY RESULT: 19.9 U/ML — HIGH
CREAT SERPL-MCNC: 0.93 MG/DL — SIGNIFICANT CHANGE UP (ref 0.5–1.3)
GLUCOSE SERPL-MCNC: 97 MG/DL — SIGNIFICANT CHANGE UP (ref 70–99)
HCT VFR BLD CALC: 35.4 % — SIGNIFICANT CHANGE UP (ref 34.5–45)
HGB BLD-MCNC: 12 G/DL — SIGNIFICANT CHANGE UP (ref 11.5–15.5)
MAGNESIUM SERPL-MCNC: 1.8 MG/DL — SIGNIFICANT CHANGE UP (ref 1.6–2.6)
MCHC RBC-ENTMCNC: 33.9 GM/DL — SIGNIFICANT CHANGE UP (ref 32–36)
MCHC RBC-ENTMCNC: 36.4 PG — HIGH (ref 27–34)
MCV RBC AUTO: 107.3 FL — HIGH (ref 80–100)
NRBC # BLD: 0 /100 WBCS — SIGNIFICANT CHANGE UP (ref 0–0)
PHOSPHATE SERPL-MCNC: 2.8 MG/DL — SIGNIFICANT CHANGE UP (ref 2.5–4.5)
PLATELET # BLD AUTO: 107 K/UL — LOW (ref 150–400)
POTASSIUM SERPL-MCNC: 3.4 MMOL/L — LOW (ref 3.5–5.3)
POTASSIUM SERPL-SCNC: 3.4 MMOL/L — LOW (ref 3.5–5.3)
RBC # BLD: 3.3 M/UL — LOW (ref 3.8–5.2)
RBC # FLD: 16.3 % — HIGH (ref 10.3–14.5)
SARS-COV-2 IGG+IGM SERPL QL IA: 19.9 U/ML — HIGH
SARS-COV-2 IGG+IGM SERPL QL IA: POSITIVE
SODIUM SERPL-SCNC: 138 MMOL/L — SIGNIFICANT CHANGE UP (ref 135–145)
WBC # BLD: 6.23 K/UL — SIGNIFICANT CHANGE UP (ref 3.8–10.5)
WBC # FLD AUTO: 6.23 K/UL — SIGNIFICANT CHANGE UP (ref 3.8–10.5)

## 2021-09-06 PROCEDURE — 83735 ASSAY OF MAGNESIUM: CPT

## 2021-09-06 PROCEDURE — 80048 BASIC METABOLIC PNL TOTAL CA: CPT

## 2021-09-06 PROCEDURE — 86901 BLOOD TYPING SEROLOGIC RH(D): CPT

## 2021-09-06 PROCEDURE — 86850 RBC ANTIBODY SCREEN: CPT

## 2021-09-06 PROCEDURE — 84100 ASSAY OF PHOSPHORUS: CPT

## 2021-09-06 PROCEDURE — 86769 SARS-COV-2 COVID-19 ANTIBODY: CPT

## 2021-09-06 PROCEDURE — 85027 COMPLETE CBC AUTOMATED: CPT

## 2021-09-06 PROCEDURE — 86900 BLOOD TYPING SEROLOGIC ABO: CPT

## 2021-09-06 PROCEDURE — 99285 EMERGENCY DEPT VISIT HI MDM: CPT

## 2021-09-06 RX ORDER — INFLUENZA VIRUS VACCINE 15; 15; 15; 15 UG/.5ML; UG/.5ML; UG/.5ML; UG/.5ML
0.5 SUSPENSION INTRAMUSCULAR ONCE
Refills: 0 | Status: DISCONTINUED | OUTPATIENT
Start: 2021-09-06 | End: 2021-09-06

## 2021-09-06 RX ORDER — TRAMADOL HYDROCHLORIDE 50 MG/1
25 TABLET ORAL ONCE
Refills: 0 | Status: DISCONTINUED | OUTPATIENT
Start: 2021-09-06 | End: 2021-09-06

## 2021-09-06 RX ORDER — ACETAMINOPHEN 500 MG
650 TABLET ORAL ONCE
Refills: 0 | Status: COMPLETED | OUTPATIENT
Start: 2021-09-06 | End: 2021-09-06

## 2021-09-06 RX ADMIN — Medication 650 MILLIGRAM(S): at 03:49

## 2021-09-06 RX ADMIN — LACTULOSE 10 GRAM(S): 10 SOLUTION ORAL at 11:17

## 2021-09-06 RX ADMIN — SPIRONOLACTONE 50 MILLIGRAM(S): 25 TABLET, FILM COATED ORAL at 06:43

## 2021-09-06 RX ADMIN — TRAMADOL HYDROCHLORIDE 25 MILLIGRAM(S): 50 TABLET ORAL at 04:32

## 2021-09-06 RX ADMIN — Medication 650 MILLIGRAM(S): at 03:19

## 2021-09-06 RX ADMIN — Medication 60 MILLIGRAM(S): at 06:43

## 2021-09-06 RX ADMIN — TRAMADOL HYDROCHLORIDE 25 MILLIGRAM(S): 50 TABLET ORAL at 04:02

## 2021-09-06 RX ADMIN — Medication 20 MILLIGRAM(S): at 06:43

## 2021-09-06 NOTE — DISCHARGE NOTE PROVIDER - HOSPITAL COURSE
66F w/ PMHx of cirrhosis s/p TIPS BIBEMS as a transfer from [place] for incarcerated inguinal hernia.  Pt. presented to OSH last night w/ abd pain. Patient has known history of right sided inguinal hernia that she reports normally always bulges out. Usually she does not have much pain at the site. however, this morning noticed bulge  was larger and has pain so she presented to the ED.  CT showed evidence of hernia containing colon. Patient had similar findings on previous CT scan in 1/2021. Currently denies any nausea or vomiting. Tolerating PO. Passing flatus and having BM.  Per progress note on the ED provider note, surgery preferred transfer to [facility] due to history of cirrhosis and previous TIPS procedure, has hep c.  Per EMS, pt. received morphine ~4am, BP was in the 90s systolic throughout the ED visit.  Pt. states her BP is usually on the lower side. Currently pain improved.  Denies any chest pain, fevers, chills, SOB, n/v, diarrhea, constipation, recent trauma or sick contacts. At the time of discharge, the patient was hemodynamically stable, was tolerating PO diet, was voiding urine and passing stool, was ambulating, and was comfortable with adequate pain control. The patient/family felt comfortable with discharge.  The patient had no other issues.

## 2021-09-06 NOTE — PROGRESS NOTE ADULT - ASSESSMENT
67y/o hx of hep C with cirrhosis s/p TIPs presenting right incarcerated inguinal hernia, likely chronic     Plan:   - pain control prn  - reg diet  - fu am labs, replete prn  - dvt ppx   - dc home    ACS  [id number]

## 2021-09-06 NOTE — PROGRESS NOTE ADULT - SUBJECTIVE AND OBJECTIVE BOX
SUBJECTIVE:   Seen and examined at bedside during AM rounds. No acute events overnight. Tolerating a reg diet. passing flatus.     OBJECTIVE: T(C): 36.5 (09-06-21 @ 05:01), Max: 36.8 (09-06-21 @ 01:02)  HR: 59 (09-06-21 @ 05:01) (50 - 69)  BP: 107/68 (09-06-21 @ 05:01) (85/68 - 109/59)  RR: 18 (09-06-21 @ 05:01) (16 - 18)  SpO2: 94% (09-06-21 @ 05:01) (94% - 96%)  Wt(kg): --  I&O's Summary    05 Sep 2021 07:01  -  06 Sep 2021 07:00  --------------------------------------------------------  IN: 800 mL / OUT: 0 mL / NET: 800 mL      I&O's Detail    05 Sep 2021 07:01  -  06 Sep 2021 07:00  --------------------------------------------------------  IN:    Oral Fluid: 800 mL  Total IN: 800 mL    OUT:    Voided (mL): 0 mL  Total OUT: 0 mL    Total NET: 800 mL        General: well developed, well nourished, NAD  Respiratory: airway patent, respirations unlabored  CVS: regular rate and rhythm  Abdomen: soft, nontender, nondistended, right inguinal hernia irreducible   Extremities: no edema, sensation and movement grossly intac    MEDICATIONS  (STANDING):  enoxaparin Injectable 40 milliGRAM(s) SubCutaneous daily  furosemide    Tablet 20 milliGRAM(s) Oral daily  lactulose Syrup 10 Gram(s) Oral daily  propranolol LA 60 milliGRAM(s) Oral daily  rifAXIMin 550 milliGRAM(s) Oral two times a day  spironolactone 50 milliGRAM(s) Oral daily    MEDICATIONS  (PRN):      LABS:                        12.0   6.23  )-----------( 107      ( 06 Sep 2021 06:53 )             35.4     09-06    138  |  102  |  13  ----------------------------<  97  3.4<L>   |  25  |  0.93    Ca    9.0      06 Sep 2021 06:52  Phos  2.8     09-06  Mg     1.8     09-06    TPro  6.3  /  Alb  2.2<L>  /  TBili  4.1<H>  /  DBili  x   /  AST  77<H>  /  ALT  40  /  AlkPhos  232<H>  09-05    PT/INR - ( 05 Sep 2021 03:48 )   PT: 18.0 sec;   INR: 1.57 ratio         PTT - ( 05 Sep 2021 03:48 )  PTT:26.3 sec

## 2021-09-06 NOTE — DISCHARGE NOTE NURSING/CASE MANAGEMENT/SOCIAL WORK - PATIENT PORTAL LINK FT
You can access the FollowMyHealth Patient Portal offered by [facility] by registering at the following website: [email or web address]. By joining [employer]’s FollowMyHealth portal, you will also be able to view your health information using other applications (apps) compatible with our system.

## 2021-09-06 NOTE — DISCHARGE NOTE PROVIDER - CARE PROVIDER_API CALL
Usman Awad)  Surgery; Surgical Critical Care  [address]  [place] [address]  Phone: [phone]  Fax: [phone]  Follow Up Time: 2 weeks

## 2021-09-06 NOTE — DISCHARGE NOTE PROVIDER - NSDCMRMEDTOKEN_GEN_ALL_CORE_FT
furosemide 20 mg oral tablet:   lactulose 10 g/15 mL oral syrup: 15 milliliter(s) orally once a day  propranolol 60 mg oral capsule, extended release: 1 cap(s) orally once a day  spironolactone 50 mg oral tablet: 1 tab(s) orally once a day  Xifaxan 550 mg oral tablet: 1 tab(s) orally 2 times a day

## 2021-09-06 NOTE — DISCHARGE NOTE NURSING/CASE MANAGEMENT/SOCIAL WORK - NSDCPEFALRISK_GEN_ALL_CORE
For information on Fall & injury Prevention, visit [email or web address]

## 2021-09-06 NOTE — DISCHARGE NOTE PROVIDER - NSDCCPCAREPLAN_GEN_ALL_CORE_FT
PRINCIPAL DISCHARGE DIAGNOSIS  Diagnosis: Incarcerated hernia  Assessment and Plan of Treatment: chronic without signs of obstruction. managed nonoperatively and monitored overnight.

## 2022-05-10 ENCOUNTER — EMERGENCY (EMERGENCY)
Facility: HOSPITAL | Age: 67
LOS: 1 days | Discharge: HOME | End: 2022-05-10
Attending: STUDENT IN AN ORGANIZED HEALTH CARE EDUCATION/TRAINING PROGRAM | Admitting: STUDENT IN AN ORGANIZED HEALTH CARE EDUCATION/TRAINING PROGRAM
Payer: COMMERCIAL

## 2022-05-10 VITALS
SYSTOLIC BLOOD PRESSURE: 121 MMHG | DIASTOLIC BLOOD PRESSURE: 62 MMHG | WEIGHT: 154.98 LBS | TEMPERATURE: 98 F | RESPIRATION RATE: 18 BRPM | HEIGHT: 66 IN | OXYGEN SATURATION: 98 % | HEART RATE: 84 BPM

## 2022-05-10 DIAGNOSIS — Z95.828 PRESENCE OF OTHER VASCULAR IMPLANTS AND GRAFTS: Chronic | ICD-10-CM

## 2022-05-10 LAB
ALBUMIN SERPL ELPH-MCNC: 2 G/DL — LOW (ref 3.3–5)
ALP SERPL-CCNC: 280 U/L — HIGH (ref 40–120)
ALT FLD-CCNC: 26 U/L — SIGNIFICANT CHANGE UP (ref 12–78)
ANION GAP SERPL CALC-SCNC: 5 MMOL/L — SIGNIFICANT CHANGE UP (ref 5–17)
AST SERPL-CCNC: 63 U/L — HIGH (ref 15–37)
BASOPHILS # BLD AUTO: 0.06 K/UL — SIGNIFICANT CHANGE UP (ref 0–0.2)
BASOPHILS NFR BLD AUTO: 1 % — SIGNIFICANT CHANGE UP (ref 0–2)
BILIRUB SERPL-MCNC: 7.3 MG/DL — HIGH (ref 0.2–1.2)
BUN SERPL-MCNC: 11 MG/DL — SIGNIFICANT CHANGE UP (ref 7–23)
CALCIUM SERPL-MCNC: 8.1 MG/DL — LOW (ref 8.5–10.1)
CHLORIDE SERPL-SCNC: 110 MMOL/L — HIGH (ref 96–108)
CO2 SERPL-SCNC: 28 MMOL/L — SIGNIFICANT CHANGE UP (ref 22–31)
CREAT SERPL-MCNC: 0.71 MG/DL — SIGNIFICANT CHANGE UP (ref 0.5–1.3)
EGFR: 95 ML/MIN/1.73M2 — SIGNIFICANT CHANGE UP
EOSINOPHIL # BLD AUTO: 0.06 K/UL — SIGNIFICANT CHANGE UP (ref 0–0.5)
EOSINOPHIL NFR BLD AUTO: 1 % — SIGNIFICANT CHANGE UP (ref 0–6)
FLUAV AG NPH QL: SIGNIFICANT CHANGE UP
FLUBV AG NPH QL: SIGNIFICANT CHANGE UP
GLUCOSE SERPL-MCNC: 86 MG/DL — SIGNIFICANT CHANGE UP (ref 70–99)
HCT VFR BLD CALC: 34 % — LOW (ref 34.5–45)
HGB BLD-MCNC: 11.4 G/DL — LOW (ref 11.5–15.5)
LYMPHOCYTES # BLD AUTO: 1.55 K/UL — SIGNIFICANT CHANGE UP (ref 1–3.3)
LYMPHOCYTES # BLD AUTO: 25 % — SIGNIFICANT CHANGE UP (ref 13–44)
MCHC RBC-ENTMCNC: 33.5 GM/DL — SIGNIFICANT CHANGE UP (ref 32–36)
MCHC RBC-ENTMCNC: 38.6 PG — HIGH (ref 27–34)
MCV RBC AUTO: 115.3 FL — HIGH (ref 80–100)
MONOCYTES # BLD AUTO: 1.12 K/UL — HIGH (ref 0–0.9)
MONOCYTES NFR BLD AUTO: 18 % — HIGH (ref 2–14)
NEUTROPHILS # BLD AUTO: 3.22 K/UL — SIGNIFICANT CHANGE UP (ref 1.8–7.4)
NEUTROPHILS NFR BLD AUTO: 52 % — SIGNIFICANT CHANGE UP (ref 43–77)
NRBC # BLD: SIGNIFICANT CHANGE UP /100 WBCS (ref 0–0)
NT-PROBNP SERPL-SCNC: 214 PG/ML — HIGH (ref 0–125)
PLATELET # BLD AUTO: 110 K/UL — LOW (ref 150–400)
POTASSIUM SERPL-MCNC: 3.4 MMOL/L — LOW (ref 3.5–5.3)
POTASSIUM SERPL-SCNC: 3.4 MMOL/L — LOW (ref 3.5–5.3)
PROT SERPL-MCNC: 6.5 G/DL — SIGNIFICANT CHANGE UP (ref 6–8.3)
RBC # BLD: 2.95 M/UL — LOW (ref 3.8–5.2)
RBC # FLD: 18.1 % — HIGH (ref 10.3–14.5)
RSV RNA NPH QL NAA+NON-PROBE: SIGNIFICANT CHANGE UP
SARS-COV-2 RNA SPEC QL NAA+PROBE: SIGNIFICANT CHANGE UP
SODIUM SERPL-SCNC: 143 MMOL/L — SIGNIFICANT CHANGE UP (ref 135–145)
WBC # BLD: 6.2 K/UL — SIGNIFICANT CHANGE UP (ref 3.8–10.5)
WBC # FLD AUTO: 6.2 K/UL — SIGNIFICANT CHANGE UP (ref 3.8–10.5)

## 2022-05-10 PROCEDURE — 71046 X-RAY EXAM CHEST 2 VIEWS: CPT | Mod: 26

## 2022-05-10 PROCEDURE — 93010 ELECTROCARDIOGRAM REPORT: CPT

## 2022-05-10 PROCEDURE — 99284 EMERGENCY DEPT VISIT MOD MDM: CPT

## 2022-05-10 PROCEDURE — 93010 ELECTROCARDIOGRAM REPORT: CPT | Mod: 77

## 2022-05-10 RX ORDER — FUROSEMIDE 40 MG
40 TABLET ORAL ONCE
Refills: 0 | Status: COMPLETED | OUTPATIENT
Start: 2022-05-10 | End: 2022-05-10

## 2022-05-10 RX ORDER — SODIUM CHLORIDE 9 MG/ML
1000 INJECTION INTRAMUSCULAR; INTRAVENOUS; SUBCUTANEOUS ONCE
Refills: 0 | Status: COMPLETED | OUTPATIENT
Start: 2022-05-10 | End: 2022-05-10

## 2022-05-10 RX ORDER — POTASSIUM CHLORIDE 20 MEQ
40 PACKET (EA) ORAL ONCE
Refills: 0 | Status: COMPLETED | OUTPATIENT
Start: 2022-05-10 | End: 2022-05-10

## 2022-05-10 RX ORDER — CEFTRIAXONE 500 MG/1
1000 INJECTION, POWDER, FOR SOLUTION INTRAMUSCULAR; INTRAVENOUS ONCE
Refills: 0 | Status: COMPLETED | OUTPATIENT
Start: 2022-05-10 | End: 2022-05-10

## 2022-05-10 RX ADMIN — CEFTRIAXONE 100 MILLIGRAM(S): 500 INJECTION, POWDER, FOR SOLUTION INTRAMUSCULAR; INTRAVENOUS at 23:47

## 2022-05-10 RX ADMIN — Medication 40 MILLIGRAM(S): at 23:40

## 2022-05-10 RX ADMIN — SODIUM CHLORIDE 2000 MILLILITER(S): 9 INJECTION INTRAMUSCULAR; INTRAVENOUS; SUBCUTANEOUS at 21:28

## 2022-05-10 RX ADMIN — Medication 40 MILLIEQUIVALENT(S): at 23:41

## 2022-05-10 NOTE — ED PROVIDER NOTE - OBJECTIVE STATEMENT
65 yo female with h/o cirrhosis, hep c, hepatic encephalopathy, htn, hld presents to the ED c/o productive cough x 1 week. no recent sick contacts or travel. + covid vaccinated. + swelling bilateral LE. + mild sob with exertion. Denies fever, chills, chest pain, sob, abd pain, N/V, UE/LE weakness or paresthesias. + mild nasal congestion.    pmd: Dr. Yao Nunez

## 2022-05-10 NOTE — ED PROVIDER NOTE - PATIENT PORTAL LINK FT
You can access the FollowMyHealth Patient Portal offered by [facility] by registering at the following website: [email or web address]. By joining [employer]’s FollowMyHealth portal, you will also be able to view your health information using other applications (apps) compatible with our system.

## 2022-05-10 NOTE — ED PROVIDER NOTE - NSFOLLOWUPINSTRUCTIONS_ED_ALL_ED_FT
Please follow up with your Primary Care Physician and any specialists as discussed.  Please take your medications as prescribed.  If your symptoms persist or worsen, please seek care. Either return to the Emergency Department, go to urgent care or see your primary care doctor.  See refer to general information and follow up instructions below:     Pneumonia    WHAT YOU NEED TO KNOW:    Pneumonia is an infection in your lungs caused by bacteria, viruses, fungi, or parasites. You can become infected if you come in contact with someone who is sick. You can get pneumonia if you recently had surgery or needed a ventilator to help you breathe. Pneumonia can also be caused by accidentally inhaling saliva or small pieces of food. Pneumonia may cause mild symptoms, or it can be severe and life-threatening. The Lungs         DISCHARGE INSTRUCTIONS:    Return to the emergency department if:     You cough up blood.       Your heart beats more than 100 beats in 1 minute.       You are very tired, confused, and cannot think clearly.      You have chest pain or trouble breathing.       Your lips or fingernails turn gray or blue.     Contact your healthcare provider if:     Your symptoms are the same or get worse 48 hours after you start antibiotics.      Your fever is not below 99°F (37.2°C) 48 hours after you start antibiotics.       You have a fever higher than 101°F (38.3°C).       You cannot eat, or you have loss of appetite, nausea, or are vomiting.      You have questions or concerns about your condition or care.    Medicines:     Antibiotics treat pneumonia caused by bacteria.      Acetaminophen decreases pain and fever. It is available without a doctor's order. Ask how much to take and how often to take it. Follow directions. Read the labels of all other medicines you are using to see if they also contain acetaminophen, or ask your doctor or pharmacist. Acetaminophen can cause liver damage if not taken correctly. Do not use more than 4 grams (4,000 milligrams) total of acetaminophen in one day.       NSAIDs, such as ibuprofen, help decrease swelling, pain, and fever. This medicine is available with or without a doctor's order. NSAIDs can cause stomach bleeding or kidney problems in certain people. If you take blood thinner medicine, always ask your healthcare provider if NSAIDs are safe for you. Always read the medicine label and follow directions.      Take your medicine as directed. Contact your healthcare provider if you think your medicine is not helping or if you have side effects. Tell him or her if you are allergic to any medicine. Keep a list of the medicines, vitamins, and herbs you take. Include the amounts, and when and why you take them. Bring the list or the pill bottles to follow-up visits. Carry your medicine list with you in case of an emergency.    Follow up with your healthcare provider as directed: You will need to return for more tests. Write down your questions so you remember to ask them during your visits.     Manage your symptoms:     Rest as needed. Rest often throughout the day. Alternate times of activity with times of rest.      Drink liquids as directed. Ask how much liquid to drink each day and which liquids are best for you. Liquids help thin your mucus, which may make it easier for you to cough it up.       Do not smoke. Avoid secondhand smoke. Smoking increases your risk for pneumonia. Smoking also makes it harder for you to get better after you have had pneumonia. Ask your healthcare provider for information if you need help to quit smoking.       Use a cool mist humidifier. A humidifier will help increase air moisture in your home. This may make it easier for you to breathe and help decrease your cough.       Keep your head elevated. You may be able to breathe better if you lie down with the head of your bed up.     Prevent pneumonia:     Prevent the spread of germs. Wash your hands often with soap and water. Use gel hand cleanser when there is no soap and water available. Do not touch your eyes, nose, or mouth unless you have washed your hands first. Cover your mouth when you cough. Cough into a tissue or your shirtsleeve so you do not spread germs from your hands. If you are sick, stay away from others as much as possible. Handwashing           Limit alcohol. Women should limit alcohol to 1 drink a day. Men should limit alcohol to 2 drinks a day. A drink of alcohol is 12 ounces of beer, 5 ounces of wine, or 1½ ounces of liquor.      Ask about vaccines. You may need a vaccine to help prevent pneumonia. Get an influenza (flu) vaccine every year as soon as it becomes available.

## 2022-05-10 NOTE — ED PROVIDER NOTE - INTERNATIONAL TRAVEL
Severe  Pt needs to establish w/ psychiatry  Pt was directed as an option to [facility] behavioral health, pt had meet w/ psychologist and did not want to do weekly meetings  Did not see psychiatrist  Requests psychiatrist referral  Has been advised no referral needed, has to go through insurance for providers in network  meds prior have been renewed until seeing psychiatrist  Has also been directed in past to first access with [facility] for urgent care   No

## 2022-05-10 NOTE — ED PROVIDER NOTE - CLINICAL SUMMARY MEDICAL DECISION MAKING FREE TEXT BOX
here with cough, malaise, shortness of breath, leg swelling in setting of cirrhosis s/p tips. r/o PNA v fluid overload. patient does not appear encephalopathic.

## 2022-05-10 NOTE — ED PROVIDER NOTE - ATTENDING APP SHARED VISIT CONTRIBUTION OF CARE
This was a shared visit with DIMPLE. I reviewed and verified the documentation and independently performed the documented MDM.

## 2022-05-10 NOTE — ED PROVIDER NOTE - CARDIAC, MLM
Normal rate, regular rhythm.  Heart sounds S1, S2.  No murmurs, rubs or gallops. + 2+ bilateral pitting edema

## 2022-05-10 NOTE — ED PROVIDER NOTE - PROGRESS NOTE DETAILS
KV: patient feels well, will treat as outpatient with ABX for PNA. likely some fluid overload as well. will give IV lasix.

## 2022-05-10 NOTE — ED PROVIDER NOTE - NS ED ATTENDING STATEMENT MOD
This was a shared visit with the DIMPLE. I reviewed and verified the documentation and independently performed the documented:

## 2022-05-10 NOTE — ED PROVIDER NOTE - PROVIDER TOKENS
PROVIDER:[TOKEN:[[id number]],FOLLOWUP:[1-3 Days],ESTABLISHEDPATIENT:[T]],PROVIDER:[TOKEN:[[id number]],FOLLOWUP:[1-3 Days],ESTABLISHEDPATIENT:[T]]

## 2022-05-10 NOTE — ED PROVIDER NOTE - CARE PROVIDER_API CALL
AIMEE CRAVEN  Internal Medicine  [address]  [place] [address]  Phone: [phone]  Fax: [phone]  Established Patient  Follow Up Time: 1-3 Days    SHAE RODARTE  Internal Medicine  [address] [place] [id number]  Phone: [phone]  Fax: [phone]  Established Patient  Follow Up Time: 1-3 Days

## 2022-05-11 PROCEDURE — 96375 TX/PRO/DX INJ NEW DRUG ADDON: CPT

## 2022-05-11 PROCEDURE — 96374 THER/PROPH/DIAG INJ IV PUSH: CPT

## 2022-05-11 PROCEDURE — 99285 EMERGENCY DEPT VISIT HI MDM: CPT | Mod: 25

## 2022-05-11 PROCEDURE — 80053 COMPREHEN METABOLIC PANEL: CPT

## 2022-05-11 PROCEDURE — 83880 ASSAY OF NATRIURETIC PEPTIDE: CPT

## 2022-05-11 PROCEDURE — 87637 SARSCOV2&INF A&B&RSV AMP PRB: CPT

## 2022-05-11 PROCEDURE — 85025 COMPLETE CBC W/AUTO DIFF WBC: CPT

## 2022-05-11 PROCEDURE — 71046 X-RAY EXAM CHEST 2 VIEWS: CPT

## 2022-05-11 PROCEDURE — 93005 ELECTROCARDIOGRAM TRACING: CPT

## 2022-05-11 PROCEDURE — 36415 COLL VENOUS BLD VENIPUNCTURE: CPT

## 2022-05-11 RX ORDER — CEFPODOXIME PROXETIL 100 MG
1 TABLET ORAL
Qty: 20 | Refills: 0
Start: 2022-05-11 | End: 2022-05-20

## 2022-05-11 RX ADMIN — Medication 110 MILLIGRAM(S): at 00:20

## 2022-07-27 ENCOUNTER — INPATIENT (INPATIENT)
Facility: HOSPITAL | Age: 67
LOS: 2 days | Discharge: HOME | DRG: 442 | End: 2022-07-30
Attending: INTERNAL MEDICINE | Admitting: INTERNAL MEDICINE
Payer: COMMERCIAL

## 2022-07-27 VITALS
TEMPERATURE: 95 F | OXYGEN SATURATION: 92 % | SYSTOLIC BLOOD PRESSURE: 98 MMHG | HEART RATE: 56 BPM | HEIGHT: 66 IN | RESPIRATION RATE: 18 BRPM | WEIGHT: 293 LBS | DIASTOLIC BLOOD PRESSURE: 54 MMHG

## 2022-07-27 DIAGNOSIS — Z95.828 PRESENCE OF OTHER VASCULAR IMPLANTS AND GRAFTS: Chronic | ICD-10-CM

## 2022-07-27 DIAGNOSIS — R41.82 ALTERED MENTAL STATUS, UNSPECIFIED: ICD-10-CM

## 2022-07-27 LAB
ALBUMIN SERPL ELPH-MCNC: 1.5 G/DL — LOW (ref 3.3–5)
ALP SERPL-CCNC: 217 U/L — HIGH (ref 40–120)
ALT FLD-CCNC: 21 U/L — SIGNIFICANT CHANGE UP (ref 12–78)
AMMONIA BLD-MCNC: 45 UMOL/L — HIGH (ref 11–32)
ANION GAP SERPL CALC-SCNC: 6 MMOL/L — SIGNIFICANT CHANGE UP (ref 5–17)
APAP SERPL-MCNC: <2 UG/ML — LOW (ref 10–30)
AST SERPL-CCNC: 66 U/L — HIGH (ref 15–37)
BASOPHILS # BLD AUTO: 0.06 K/UL — SIGNIFICANT CHANGE UP (ref 0–0.2)
BASOPHILS NFR BLD AUTO: 0.9 % — SIGNIFICANT CHANGE UP (ref 0–2)
BILIRUB SERPL-MCNC: 6.4 MG/DL — HIGH (ref 0.2–1.2)
BUN SERPL-MCNC: 18 MG/DL — SIGNIFICANT CHANGE UP (ref 7–23)
CALCIUM SERPL-MCNC: 8.1 MG/DL — LOW (ref 8.5–10.1)
CHLORIDE SERPL-SCNC: 104 MMOL/L — SIGNIFICANT CHANGE UP (ref 96–108)
CO2 SERPL-SCNC: 25 MMOL/L — SIGNIFICANT CHANGE UP (ref 22–31)
CREAT SERPL-MCNC: 0.69 MG/DL — SIGNIFICANT CHANGE UP (ref 0.5–1.3)
EGFR: 97 ML/MIN/1.73M2 — SIGNIFICANT CHANGE UP
EOSINOPHIL # BLD AUTO: 0.32 K/UL — SIGNIFICANT CHANGE UP (ref 0–0.5)
EOSINOPHIL NFR BLD AUTO: 4.6 % — SIGNIFICANT CHANGE UP (ref 0–6)
GLUCOSE SERPL-MCNC: 105 MG/DL — HIGH (ref 70–99)
HCT VFR BLD CALC: 34.9 % — SIGNIFICANT CHANGE UP (ref 34.5–45)
HGB BLD-MCNC: 11.5 G/DL — SIGNIFICANT CHANGE UP (ref 11.5–15.5)
IMM GRANULOCYTES NFR BLD AUTO: 0.3 % — SIGNIFICANT CHANGE UP (ref 0–1.5)
LACTATE SERPL-SCNC: 2.7 MMOL/L — HIGH (ref 0.7–2)
LACTATE SERPL-SCNC: 2.8 MMOL/L — HIGH (ref 0.7–2)
LIDOCAIN IGE QN: 142 U/L — SIGNIFICANT CHANGE UP (ref 73–393)
LYMPHOCYTES # BLD AUTO: 0.85 K/UL — LOW (ref 1–3.3)
LYMPHOCYTES # BLD AUTO: 12.3 % — LOW (ref 13–44)
MAGNESIUM SERPL-MCNC: 1.7 MG/DL — SIGNIFICANT CHANGE UP (ref 1.6–2.6)
MCHC RBC-ENTMCNC: 33 GM/DL — SIGNIFICANT CHANGE UP (ref 32–36)
MCHC RBC-ENTMCNC: 37.8 PG — HIGH (ref 27–34)
MCV RBC AUTO: 114.8 FL — HIGH (ref 80–100)
MONOCYTES # BLD AUTO: 0.86 K/UL — SIGNIFICANT CHANGE UP (ref 0–0.9)
MONOCYTES NFR BLD AUTO: 12.5 % — SIGNIFICANT CHANGE UP (ref 2–14)
NEUTROPHILS # BLD AUTO: 4.78 K/UL — SIGNIFICANT CHANGE UP (ref 1.8–7.4)
NEUTROPHILS NFR BLD AUTO: 69.4 % — SIGNIFICANT CHANGE UP (ref 43–77)
NRBC # BLD: 0 /100 WBCS — SIGNIFICANT CHANGE UP (ref 0–0)
PLATELET # BLD AUTO: 106 K/UL — LOW (ref 150–400)
POTASSIUM SERPL-MCNC: 4.5 MMOL/L — SIGNIFICANT CHANGE UP (ref 3.5–5.3)
POTASSIUM SERPL-SCNC: 4.5 MMOL/L — SIGNIFICANT CHANGE UP (ref 3.5–5.3)
PROT SERPL-MCNC: 5.9 G/DL — LOW (ref 6–8.3)
RBC # BLD: 3.04 M/UL — LOW (ref 3.8–5.2)
RBC # FLD: 16.5 % — HIGH (ref 10.3–14.5)
SARS-COV-2 RNA SPEC QL NAA+PROBE: SIGNIFICANT CHANGE UP
SODIUM SERPL-SCNC: 135 MMOL/L — SIGNIFICANT CHANGE UP (ref 135–145)
TSH SERPL-MCNC: 0.86 UIU/ML — SIGNIFICANT CHANGE UP (ref 0.36–3.74)
WBC # BLD: 6.89 K/UL — SIGNIFICANT CHANGE UP (ref 3.8–10.5)
WBC # FLD AUTO: 6.89 K/UL — SIGNIFICANT CHANGE UP (ref 3.8–10.5)

## 2022-07-27 PROCEDURE — 99285 EMERGENCY DEPT VISIT HI MDM: CPT

## 2022-07-27 PROCEDURE — 93010 ELECTROCARDIOGRAM REPORT: CPT

## 2022-07-27 PROCEDURE — G1004: CPT

## 2022-07-27 PROCEDURE — 70450 CT HEAD/BRAIN W/O DYE: CPT | Mod: 26,MG

## 2022-07-27 RX ORDER — SODIUM CHLORIDE 9 MG/ML
1000 INJECTION INTRAMUSCULAR; INTRAVENOUS; SUBCUTANEOUS ONCE
Refills: 0 | Status: COMPLETED | OUTPATIENT
Start: 2022-07-27 | End: 2022-07-27

## 2022-07-27 RX ORDER — FUROSEMIDE 40 MG
0 TABLET ORAL
Qty: 0 | Refills: 0 | DISCHARGE

## 2022-07-27 RX ORDER — LACTULOSE 10 G/15ML
45 SOLUTION ORAL ONCE
Refills: 0 | Status: COMPLETED | OUTPATIENT
Start: 2022-07-27 | End: 2022-07-27

## 2022-07-27 RX ORDER — SODIUM CHLORIDE 9 MG/ML
1000 INJECTION, SOLUTION INTRAVENOUS ONCE
Refills: 0 | Status: COMPLETED | OUTPATIENT
Start: 2022-07-27 | End: 2022-07-27

## 2022-07-27 RX ORDER — SPIRONOLACTONE 25 MG/1
1 TABLET, FILM COATED ORAL
Qty: 0 | Refills: 0 | DISCHARGE

## 2022-07-27 RX ORDER — LACTULOSE 10 G/15ML
15 SOLUTION ORAL
Qty: 0 | Refills: 0 | DISCHARGE

## 2022-07-27 RX ORDER — PROPRANOLOL HCL 160 MG
60 CAPSULE, EXTENDED RELEASE 24HR ORAL DAILY
Refills: 0 | Status: DISCONTINUED | OUTPATIENT
Start: 2022-07-27 | End: 2022-07-30

## 2022-07-27 RX ORDER — ATORVASTATIN CALCIUM 80 MG/1
10 TABLET, FILM COATED ORAL AT BEDTIME
Refills: 0 | Status: DISCONTINUED | OUTPATIENT
Start: 2022-07-27 | End: 2022-07-30

## 2022-07-27 RX ORDER — LACTULOSE 10 G/15ML
20 SOLUTION ORAL
Refills: 0 | Status: DISCONTINUED | OUTPATIENT
Start: 2022-07-27 | End: 2022-07-28

## 2022-07-27 RX ORDER — SODIUM CHLORIDE 9 MG/ML
1000 INJECTION INTRAMUSCULAR; INTRAVENOUS; SUBCUTANEOUS
Refills: 0 | Status: DISCONTINUED | OUTPATIENT
Start: 2022-07-27 | End: 2022-07-29

## 2022-07-27 RX ORDER — PROPRANOLOL HCL 160 MG
1 CAPSULE, EXTENDED RELEASE 24HR ORAL
Qty: 0 | Refills: 0 | DISCHARGE

## 2022-07-27 RX ORDER — LACTULOSE 10 G/15ML
30 SOLUTION ORAL
Qty: 0 | Refills: 0 | DISCHARGE

## 2022-07-27 RX ADMIN — SODIUM CHLORIDE 1000 MILLILITER(S): 9 INJECTION, SOLUTION INTRAVENOUS at 15:56

## 2022-07-27 RX ADMIN — LACTULOSE 45 GRAM(S): 10 SOLUTION ORAL at 18:55

## 2022-07-27 RX ADMIN — SODIUM CHLORIDE 75 MILLILITER(S): 9 INJECTION INTRAMUSCULAR; INTRAVENOUS; SUBCUTANEOUS at 23:25

## 2022-07-27 RX ADMIN — SODIUM CHLORIDE 1000 MILLILITER(S): 9 INJECTION INTRAMUSCULAR; INTRAVENOUS; SUBCUTANEOUS at 21:28

## 2022-07-27 NOTE — ED ADULT NURSE REASSESSMENT NOTE - NS ED NURSE REASSESS COMMENT FT1
2258- Dr. berumen made aware BP is 88/54. pt AOx4, denies pain stating she has a low blood pressure. Dr. Berumen states BP is stable at this time. will start maintenance fluids as ordered. will continue to monitor. TIFFANIE RN

## 2022-07-27 NOTE — ED PROVIDER NOTE - PROGRESS NOTE DETAILS
patient became anxious, improved with valium. ct scan without acute traumatic abnormality. has no neck pain, do not believe the findings at c5-c6 to be acute traumatic abnormality. mental status improved at this time. found to be hyperbilirubinemia. spoke with dr. urrutia who will admit under dr. burger.

## 2022-07-27 NOTE — ED ADULT NURSE NOTE - OBJECTIVE STATEMENT
pt BIBA from home A&Ox4 w/complaints of increased weakness and lethargy x approx 1 week. pt jaundice noted to skin and sclera. reports hx of liver cirrhosis. as per  pt has not been sleeping and eating as much. denies ETOH use x 15 years.  denies drug use. slurred speech noted. patient drowsy but arousable to voice. moving all extremities equally. respirations even and nonlabored.

## 2022-07-27 NOTE — ED ADULT NURSE REASSESSMENT NOTE - NS ED NURSE REASSESS COMMENT FT1
2119- pt hypotensive, denies pain, chest pain and difficulty breathing. O2 saturation was 88% on RA. pt placed on 2L NC, O2 saturation increased to 94%. Pt AOx4, following commands, brathing equal and unlabored. Dr. Berumen has been called, no answer.. Nursing supervisor aware.  TIFFANIE, RN

## 2022-07-27 NOTE — ED ADULT NURSE REASSESSMENT NOTE - NS ED NURSE REASSESS COMMENT FT1
2128- Dr. Berumen aware of low O2 saturation and hypotension. Fluids administered intravenously as ordered. safety measures maintained. will continue to monitor. TIFFANIE, RN

## 2022-07-27 NOTE — ED PROVIDER NOTE - CARE PLAN
Principal Discharge DX:	Abdominal pain  Secondary Diagnosis:	Thyroid nodule   1 Principal Discharge DX:	Change in mental status  Secondary Diagnosis:	Hyperbilirubinemia

## 2022-07-27 NOTE — ED PROVIDER NOTE - OBJECTIVE STATEMENT
patient with pmh cirrhosis, hep c, hepatic encephalopathy, htn, hld patient with pmh cirrhosis, hep c, hepatic encephalopathy, htn, hld presenting with concerns for altered mental status and fatigue.  History obtained from patient .  Over the past 2 days he had noted her to be decompensating.  States that she has not been walking as well and has been more tired. no reported falls per . Unsure if she had bowel movement 1 to 2 days ago.  Supposed be on lactulose which she reports she is taking.  She has not had any vomiting or any other complaints.  No reported pain.  No fevers at home.  She had a TIPS procedure 2 years ago due to alcoholic cirrhosis.  She has not had a drink in about 15 years.  Follows with Dr. Sue for pcp.

## 2022-07-27 NOTE — ED PROVIDER NOTE - CLINICAL SUMMARY MEDICAL DECISION MAKING FREE TEXT BOX
Patient presenting here with concern for change in mental status.  Patient with history of hepatic encephalopathy, does have some mild right-sided asterixis on exam, possible hyperammonia.  No reported falls to suggest trauma causing symptoms.  Will need to evaluate for underlying electrolyte abnormalities as well versus worsening of her liver disease.  Patient afebrile here, less likely acute infectious etiology causing symptoms.  We will plan to evaluate with lab work, imaging and reassess.

## 2022-07-27 NOTE — ED ADULT TRIAGE NOTE - CHIEF COMPLAINT QUOTE
Pt biba sent by  for AMS.  Pt appears lethargic, alert and confused at this time.   Skin pale/jaundice.   Respirations even and unlabored.   Pt alert and disoriented.    by ems.   Pt hypoxic at present.   Appears weak. BN

## 2022-07-28 DIAGNOSIS — I10 ESSENTIAL (PRIMARY) HYPERTENSION: ICD-10-CM

## 2022-07-28 DIAGNOSIS — K72.90 HEPATIC FAILURE, UNSPECIFIED WITHOUT COMA: ICD-10-CM

## 2022-07-28 DIAGNOSIS — M54.9 DORSALGIA, UNSPECIFIED: ICD-10-CM

## 2022-07-28 DIAGNOSIS — N39.0 URINARY TRACT INFECTION, SITE NOT SPECIFIED: ICD-10-CM

## 2022-07-28 LAB
ALBUMIN SERPL ELPH-MCNC: 1.6 G/DL — LOW (ref 3.3–5)
ALP SERPL-CCNC: 198 U/L — HIGH (ref 40–120)
ALT FLD-CCNC: 18 U/L — SIGNIFICANT CHANGE UP (ref 12–78)
AMMONIA BLD-MCNC: <10 UMOL/L — SIGNIFICANT CHANGE UP (ref 11–32)
ANION GAP SERPL CALC-SCNC: 8 MMOL/L — SIGNIFICANT CHANGE UP (ref 5–17)
APPEARANCE UR: ABNORMAL
AST SERPL-CCNC: 48 U/L — HIGH (ref 15–37)
BACTERIA # UR AUTO: ABNORMAL
BILIRUB SERPL-MCNC: 5.1 MG/DL — HIGH (ref 0.2–1.2)
BILIRUB UR-MCNC: ABNORMAL
BUN SERPL-MCNC: 21 MG/DL — SIGNIFICANT CHANGE UP (ref 7–23)
CALCIUM SERPL-MCNC: 7.9 MG/DL — LOW (ref 8.5–10.1)
CHLORIDE SERPL-SCNC: 107 MMOL/L — SIGNIFICANT CHANGE UP (ref 96–108)
CO2 SERPL-SCNC: 26 MMOL/L — SIGNIFICANT CHANGE UP (ref 22–31)
COLOR SPEC: ABNORMAL
CREAT SERPL-MCNC: 0.71 MG/DL — SIGNIFICANT CHANGE UP (ref 0.5–1.3)
DIFF PNL FLD: ABNORMAL
EGFR: 94 ML/MIN/1.73M2 — SIGNIFICANT CHANGE UP
EPI CELLS # UR: ABNORMAL
GLUCOSE SERPL-MCNC: 101 MG/DL — HIGH (ref 70–99)
GLUCOSE UR QL: NEGATIVE — SIGNIFICANT CHANGE UP
HCT VFR BLD CALC: 31.2 % — LOW (ref 34.5–45)
HGB BLD-MCNC: 10.5 G/DL — LOW (ref 11.5–15.5)
KETONES UR-MCNC: ABNORMAL
LACTATE SERPL-SCNC: 2.6 MMOL/L — HIGH (ref 0.7–2)
LEUKOCYTE ESTERASE UR-ACNC: ABNORMAL
MCHC RBC-ENTMCNC: 33.7 GM/DL — SIGNIFICANT CHANGE UP (ref 32–36)
MCHC RBC-ENTMCNC: 38.6 PG — HIGH (ref 27–34)
MCV RBC AUTO: 114.7 FL — HIGH (ref 80–100)
NITRITE UR-MCNC: POSITIVE
NRBC # BLD: 0 /100 WBCS — SIGNIFICANT CHANGE UP (ref 0–0)
PH UR: 6 — SIGNIFICANT CHANGE UP (ref 5–8)
PLATELET # BLD AUTO: 95 K/UL — LOW (ref 150–400)
POTASSIUM SERPL-MCNC: 3.3 MMOL/L — LOW (ref 3.5–5.3)
POTASSIUM SERPL-SCNC: 3.3 MMOL/L — LOW (ref 3.5–5.3)
PROT SERPL-MCNC: 5.3 G/DL — LOW (ref 6–8.3)
PROT UR-MCNC: 30 MG/DL
RBC # BLD: 2.72 M/UL — LOW (ref 3.8–5.2)
RBC # FLD: 16.4 % — HIGH (ref 10.3–14.5)
RBC CASTS # UR COMP ASSIST: ABNORMAL /HPF (ref 0–4)
SODIUM SERPL-SCNC: 141 MMOL/L — SIGNIFICANT CHANGE UP (ref 135–145)
SP GR SPEC: 1.02 — SIGNIFICANT CHANGE UP (ref 1.01–1.02)
UROBILINOGEN FLD QL: 8
WBC # BLD: 6.94 K/UL — SIGNIFICANT CHANGE UP (ref 3.8–10.5)
WBC # FLD AUTO: 6.94 K/UL — SIGNIFICANT CHANGE UP (ref 3.8–10.5)
WBC UR QL: ABNORMAL

## 2022-07-28 PROCEDURE — 71045 X-RAY EXAM CHEST 1 VIEW: CPT | Mod: 26

## 2022-07-28 RX ORDER — CEFTRIAXONE 500 MG/1
INJECTION, POWDER, FOR SOLUTION INTRAMUSCULAR; INTRAVENOUS
Refills: 0 | Status: DISCONTINUED | OUTPATIENT
Start: 2022-07-28 | End: 2022-07-30

## 2022-07-28 RX ORDER — LACTULOSE 10 G/15ML
20 SOLUTION ORAL DAILY
Refills: 0 | Status: DISCONTINUED | OUTPATIENT
Start: 2022-07-28 | End: 2022-07-30

## 2022-07-28 RX ORDER — CEFTRIAXONE 500 MG/1
1000 INJECTION, POWDER, FOR SOLUTION INTRAMUSCULAR; INTRAVENOUS EVERY 24 HOURS
Refills: 0 | Status: DISCONTINUED | OUTPATIENT
Start: 2022-07-29 | End: 2022-07-30

## 2022-07-28 RX ORDER — CEFTRIAXONE 500 MG/1
1000 INJECTION, POWDER, FOR SOLUTION INTRAMUSCULAR; INTRAVENOUS ONCE
Refills: 0 | Status: COMPLETED | OUTPATIENT
Start: 2022-07-28 | End: 2022-07-28

## 2022-07-28 RX ORDER — POTASSIUM CHLORIDE 20 MEQ
20 PACKET (EA) ORAL
Refills: 0 | Status: COMPLETED | OUTPATIENT
Start: 2022-07-28 | End: 2022-07-28

## 2022-07-28 RX ORDER — MORPHINE SULFATE 50 MG/1
2 CAPSULE, EXTENDED RELEASE ORAL EVERY 6 HOURS
Refills: 0 | Status: DISCONTINUED | OUTPATIENT
Start: 2022-07-28 | End: 2022-07-30

## 2022-07-28 RX ORDER — PANTOPRAZOLE SODIUM 20 MG/1
40 TABLET, DELAYED RELEASE ORAL
Refills: 0 | Status: DISCONTINUED | OUTPATIENT
Start: 2022-07-28 | End: 2022-07-30

## 2022-07-28 RX ADMIN — Medication 1 TABLET(S): at 12:34

## 2022-07-28 RX ADMIN — MORPHINE SULFATE 2 MILLIGRAM(S): 50 CAPSULE, EXTENDED RELEASE ORAL at 12:34

## 2022-07-28 RX ADMIN — Medication 20 MILLIEQUIVALENT(S): at 14:44

## 2022-07-28 RX ADMIN — SODIUM CHLORIDE 75 MILLILITER(S): 9 INJECTION INTRAMUSCULAR; INTRAVENOUS; SUBCUTANEOUS at 21:11

## 2022-07-28 RX ADMIN — ATORVASTATIN CALCIUM 10 MILLIGRAM(S): 80 TABLET, FILM COATED ORAL at 21:10

## 2022-07-28 RX ADMIN — LACTULOSE 20 GRAM(S): 10 SOLUTION ORAL at 06:24

## 2022-07-28 RX ADMIN — CEFTRIAXONE 1000 MILLIGRAM(S): 500 INJECTION, POWDER, FOR SOLUTION INTRAMUSCULAR; INTRAVENOUS at 13:00

## 2022-07-28 RX ADMIN — Medication 20 MILLIEQUIVALENT(S): at 10:38

## 2022-07-28 NOTE — PATIENT PROFILE ADULT - FALL HARM RISK - HARM RISK INTERVENTIONS
Assistance with ambulation/Assistance OOB with selected safe patient handling equipment/Communicate Risk of Fall with Harm to all staff/Discuss with provider need for PT consult/Monitor gait and stability/Reinforce activity limits and safety measures with patient and family/Tailored Fall Risk Interventions/Visual Cue: Yellow wristband and red socks/Bed in lowest position, wheels locked, appropriate side rails in place/Call bell, personal items and telephone in reach/Instruct patient to call for assistance before getting out of bed or chair/Non-slip footwear when patient is out of bed/[place]rielle to call system/Physically safe environment - no spills, clutter or unnecessary equipment/Purposeful Proactive Rounding/Room/bathroom lighting operational, light cord in reach

## 2022-07-28 NOTE — ED ADULT NURSE REASSESSMENT NOTE - NS ED NURSE REASSESS COMMENT FT1
0624- pt medicated as ordered. pt AOx3, mentating well. pt denies pain, chest pain and difficulty breathing. safety measures maintained. call bell within reach. will continue to monitor. TIFFANIE, RN

## 2022-07-28 NOTE — CONSULT NOTE ADULT - NSCONSULTADDITIONALINFOA_GEN_ALL_CORE
agree with antibiotics  cont xifaxin  cont lactulose, if no bm will increase  referral given for local transplant hepatologist  d/w patient

## 2022-07-28 NOTE — CONSULT NOTE ADULT - SUBJECTIVE AND OBJECTIVE BOX
[facility]  Montez Gordon PA-C  [address] [place] [id number]  [phone]      Chief Complaint:  Patient is a 67y old  Female who presents with a chief complaint of ams and confusion (2022 12:37)      HPI: 67y F with pmh cirrhosis, hep c, hepatic encephalopathy, htn, hld presenting with concerns for altered mental status and fatigue.  History obtained from patient .  Over the past 2 days he had noted her to be decompensating.  States that she has not been walking as well and has been more tired. no reported falls per . Unsure if she had bowel movement 1 to 2 days ago.  Supposed be on lactulose which she reports she is taking.  She has not had any vomiting or any other complaints.  No reported pain.  No fevers at home.  She had a TIPS procedure 2 years ago due to alcoholic cirrhosis.  She has not had a drink in about 15 years.  Follows with Dr. Sue for pcp.    GI consulted for hepatic encephalopathy. Pt seen and examined at bedside,  helps with history taking. Has been in bed with sciatic back pain ~1 week. Unsure if she missed any doses of lactulose. Now mentating well according to , as compared to yesterday, able to answer all questions appropriately. Pt and  report they were due for hepatic stent placement in February, 2 years s/p TIPS procedure but did not follow through with this.  reports the pt skin is more jaundice than usual. They see Dr. Chung at [facility]. Pt denies recent illness, excessive protein intake. Pt using ibuprofen for back pain at home. Pt denies fevers, chills, sob, abdominal pain, n/v/d/c, urinary symptoms. Pt endorses weakness, R lumbar back pain, productive cough.    Allergies:  No Known Allergies      Medications:  aluminum hydroxide/magnesium hydroxide/simethicone Suspension 30 milliLiter(s) Oral every 4 hours PRN  atorvastatin 10 milliGRAM(s) Oral at bedtime  cefTRIAXone   IVPB      cefTRIAXone   IVPB 1000 milliGRAM(s) IV Intermittent once  lactulose Syrup 20 Gram(s) Oral daily  morphine  - Injectable 2 milliGRAM(s) IV Push every 6 hours PRN  multivitamin 1 Tablet(s) Oral daily  pantoprazole    Tablet 40 milliGRAM(s) Oral before breakfast  potassium chloride    Tablet ER 20 milliEquivalent(s) Oral every 2 hours  propranolol LA 60 milliGRAM(s) Oral daily  rifAXIMin 550 milliGRAM(s) Oral two times a day  sodium chloride 0.9%. 1000 milliLiter(s) IV Continuous <Continuous>      PMHX/PSHX:  Inguinal hernia    Umbilical hernia    HTN (hypertension)    HLD (hyperlipidemia)    Cirrhosis    Hepatitis C    No significant past surgical history    S/P TIPS (transjugular intrahepatic portosystemic shunt)        Family history:  FH: diabetes mellitus (Father)        Social History:     ROS: +cough, +jaundice, +spider angiomata, + back pain, + weakness    General:  No wt loss, fevers, chills, night sweats, fatigue,   Eyes:  Good vision, no reported pain, + scleral icterus  ENT:  No sore throat, pain, runny nose, dysphagia  CV:  No pain, palpitations, hypo/hypertension  Resp:  No dyspnea, + cough, no tachypnea, no wheezing  GI:  No pain, No nausea, No vomiting, No diarrhea, No constipation, No weight loss, No fever, No pruritis, No rectal bleeding, No tarry stools, No dysphagia,  :  No pain, bleeding, incontinence, nocturia  Muscle:  + back pain, +weakness  Neuro:  + weakness, tingling, memory problems  Psych:  No fatigue, insomnia, mood problems, depression  Endocrine:  No polyuria, polydipsia, cold/heat intolerance  Heme:  No petechiae, ecchymosis, easy bruisability  Skin:  + jaundice, + spider angiomata on L forearm, R dorsum of hand, No rash, tattoos, scars, +edema B/L LE      PHYSICAL EXAM:   Vital Signs:  Vital Signs Last 24 Hrs  T(C): 36.8 (2022 08:40), Max: 36.8 (2022 05:22)  T(F): 98.3 (2022 08:40), Max: 98.3 (2022 08:40)  HR: 74 (2022 08:40) (56 - 77)  BP: 92/57 (2022 08:40) (88/54 - 102/62)  BP(mean): --  RR: 16 (2022 08:40) (15 - 18)  SpO2: 94% (2022 08:40) (88% - 98%)    Parameters below as of 2022 08:40  Patient On (Oxygen Delivery Method): room air      Daily Height in cm: 167.64 (2022 14:54)    Daily     GENERAL:  Appears stated age, resting in bed, jaundiced   HEENT:  NC/AT, scleral and sublingual icterus, EOMI, right eye ptosis  CHEST:  Full & symmetric excursion, speaking in full sentences, no accessory muscle use  HEART:  Regular rhythm, s1/s2, no MRG  ABDOMEN:  BSx4, Soft, non-tender, mildly distended from baseline  EXTEREMITIES:  no cyanosis,clubbing or edema  SKIN:  + spider angiomata on L forearm, R dorsal hand  NEURO:  Alert, oriented x3, answering questions appropriately, CN 2-12 intact, no asterixis on exam    LABS:                        10.5   6.94  )-----------( 95       ( 2022 06:55 )             31.2     07-28    141  |  107  |  21  ----------------------------<  101<H>  3.3<L>   |  26  |  0.71    Ca    7.9<L>      2022 06:55  Mg     1.7     07-    TPro  5.3<L>  /  Alb  1.6<L>  /  TBili  5.1<H>  /  DBili  x   /  AST  48<H>  /  ALT  18  /  AlkPhos  198<H>      LIVER FUNCTIONS - ( 2022 06:55 )  Alb: 1.6 g/dL / Pro: 5.3 g/dL / ALK PHOS: 198 U/L / ALT: 18 U/L / AST: 48 U/L / GGT: x             Urinalysis Basic - ( 2022 12:00 )    Color: Orange / Appearance: Turbid / S.025 / pH: x  Gluc: x / Ketone: Trace  / Bili: Moderate / Urobili: 8   Blood: x / Protein: 30 mg/dL / Nitrite: Positive   Leuk Esterase: Moderate / RBC: 6-10 /HPF / WBC 11-25   Sq Epi: x / Non Sq Epi: Many / Bacteria: Many      Amylase Serum--      Lipase serum--       Ammonia<10  Amylase Serum--      Lipase [id number]       [id number]      Imaging: N/A

## 2022-07-28 NOTE — H&P ADULT - HISTORY OF PRESENT ILLNESS
patient with pmh cirrhosis, hep c, hepatic encephalopathy, htn, hld presenting with concerns for altered mental status and fatigue.  History obtained from patient .  Over the past 2 days he had noted her to be decompensating.  States that she has not been walking as well and has been more tired. no reported falls per . Unsure if she had bowel movement 1 to 2 days ago.  Supposed be on lactulose which she reports she is taking.  She has not had any vomiting or any other complaints.  No reported pain.  No fevers at home.  She had a TIPS procedure 2 years ago due to alcoholic cirrhosis.  She has not had a drink in about 15 years.  Follows with Dr. Sue for pcp.

## 2022-07-28 NOTE — CONSULT NOTE ADULT - ASSESSMENT
67y F with pmh cirrhosis (2 years s/p TIPS), hep c, hepatic encephalopathy, htn, hld, presented with concerns for altered mental status and fatigue, admitted for hepatic encephalopathy. GI consulted, pt mentating well, ammonia now wnl. Exam notable for jaundice, spider angiomata, abdominal distention. Pt denies abdominal pain,  reports she is mentating at baseline now.

## 2022-07-28 NOTE — ED ADULT NURSE REASSESSMENT NOTE - NS ED NURSE REASSESS COMMENT FT1
0225- pt sleeping comfortably in stretcher, occasionally coughing. pt breathing equal and unlabored in no acute distress. safety measures maintained. will continue to monitor. CS, RN

## 2022-07-28 NOTE — H&P ADULT - PROBLEM SELECTOR PLAN 1
per  has not been taking lactulose regularly  ammonia level elevated  restart lacutlose bid- monitor for bm and adjust  gi eval  continue propranolol  supportive care

## 2022-07-28 NOTE — CONSULT NOTE ADULT - PROBLEM SELECTOR RECOMMENDATION 9
Pt admitted with AMS, elevated ammonia. AMS possibly 2/2 to missed lactulose. No recent illness, excessive protein intake, pain medication use. Pt now at baseline mental status, ammonia improved, exam wnl, jaundiced skin, no abdominal pain, no asterixis.    -c/w lactulose, xifaxan  -f/u with outpatient Hepatologist

## 2022-07-29 DIAGNOSIS — Z87.19 PERSONAL HISTORY OF OTHER DISEASES OF THE DIGESTIVE SYSTEM: ICD-10-CM

## 2022-07-29 LAB
ANION GAP SERPL CALC-SCNC: 5 MMOL/L — SIGNIFICANT CHANGE UP (ref 5–17)
BUN SERPL-MCNC: 18 MG/DL — SIGNIFICANT CHANGE UP (ref 7–23)
CALCIUM SERPL-MCNC: 7.8 MG/DL — LOW (ref 8.5–10.1)
CHLORIDE SERPL-SCNC: 108 MMOL/L — SIGNIFICANT CHANGE UP (ref 96–108)
CO2 SERPL-SCNC: 26 MMOL/L — SIGNIFICANT CHANGE UP (ref 22–31)
CREAT SERPL-MCNC: 0.49 MG/DL — LOW (ref 0.5–1.3)
CULTURE RESULTS: SIGNIFICANT CHANGE UP
EGFR: 105 ML/MIN/1.73M2 — SIGNIFICANT CHANGE UP
GLUCOSE SERPL-MCNC: 87 MG/DL — SIGNIFICANT CHANGE UP (ref 70–99)
HCT VFR BLD CALC: 29.1 % — LOW (ref 34.5–45)
HGB BLD-MCNC: 9.9 G/DL — LOW (ref 11.5–15.5)
LACTATE SERPL-SCNC: 1 MMOL/L — SIGNIFICANT CHANGE UP (ref 0.7–2)
MCHC RBC-ENTMCNC: 34 GM/DL — SIGNIFICANT CHANGE UP (ref 32–36)
MCHC RBC-ENTMCNC: 38.4 PG — HIGH (ref 27–34)
MCV RBC AUTO: 112.8 FL — HIGH (ref 80–100)
NRBC # BLD: 0 /100 WBCS — SIGNIFICANT CHANGE UP (ref 0–0)
PLATELET # BLD AUTO: 86 K/UL — LOW (ref 150–400)
POTASSIUM SERPL-MCNC: 3.8 MMOL/L — SIGNIFICANT CHANGE UP (ref 3.5–5.3)
POTASSIUM SERPL-SCNC: 3.8 MMOL/L — SIGNIFICANT CHANGE UP (ref 3.5–5.3)
RBC # BLD: 2.58 M/UL — LOW (ref 3.8–5.2)
RBC # FLD: 15.9 % — HIGH (ref 10.3–14.5)
SODIUM SERPL-SCNC: 139 MMOL/L — SIGNIFICANT CHANGE UP (ref 135–145)
SPECIMEN SOURCE: SIGNIFICANT CHANGE UP
WBC # BLD: 6.38 K/UL — SIGNIFICANT CHANGE UP (ref 3.8–10.5)
WBC # FLD AUTO: 6.38 K/UL — SIGNIFICANT CHANGE UP (ref 3.8–10.5)

## 2022-07-29 RX ORDER — IBUPROFEN 200 MG
400 TABLET ORAL ONCE
Refills: 0 | Status: COMPLETED | OUTPATIENT
Start: 2022-07-29 | End: 2022-07-29

## 2022-07-29 RX ADMIN — SODIUM CHLORIDE 75 MILLILITER(S): 9 INJECTION INTRAMUSCULAR; INTRAVENOUS; SUBCUTANEOUS at 05:43

## 2022-07-29 RX ADMIN — Medication 400 MILLIGRAM(S): at 01:56

## 2022-07-29 RX ADMIN — PANTOPRAZOLE SODIUM 40 MILLIGRAM(S): 20 TABLET, DELAYED RELEASE ORAL at 05:42

## 2022-07-29 RX ADMIN — MORPHINE SULFATE 2 MILLIGRAM(S): 50 CAPSULE, EXTENDED RELEASE ORAL at 19:20

## 2022-07-29 RX ADMIN — Medication 1 TABLET(S): at 13:14

## 2022-07-29 RX ADMIN — LACTULOSE 20 GRAM(S): 10 SOLUTION ORAL at 13:14

## 2022-07-29 RX ADMIN — MORPHINE SULFATE 2 MILLIGRAM(S): 50 CAPSULE, EXTENDED RELEASE ORAL at 19:35

## 2022-07-29 RX ADMIN — ATORVASTATIN CALCIUM 10 MILLIGRAM(S): 80 TABLET, FILM COATED ORAL at 22:47

## 2022-07-29 RX ADMIN — CEFTRIAXONE 100 MILLIGRAM(S): 500 INJECTION, POWDER, FOR SOLUTION INTRAMUSCULAR; INTRAVENOUS at 13:15

## 2022-07-29 RX ADMIN — Medication 400 MILLIGRAM(S): at 01:02

## 2022-07-29 NOTE — PROGRESS NOTE ADULT - NSPROGADDITIONALINFOA_GEN_ALL_CORE
agree with above unless contradicts below  cirrhotic with reducible RIH.  Admitted for other reason. At this time, will manage conservatively and can f/u at tertiary care center for surgical correction

## 2022-07-29 NOTE — PROGRESS NOTE ADULT - PROBLEM SELECTOR PLAN 1
improving and mental state at baseline  gi noted  continue lacutlose and xifaxan  outpt fu with hepatology

## 2022-07-29 NOTE — PHYSICAL THERAPY INITIAL EVALUATION ADULT - PLANNED THERAPY INTERVENTIONS, PT EVAL
4-5 Sessions: Stair Negotiation - Ascend/descend 12 steps with bilateral handrails independently./bed mobility training/gait training/transfer training

## 2022-07-29 NOTE — PROVIDER CONTACT NOTE (OTHER) - REASON
Pt has noted Inguinal hernia and Right labia has noted +3 edema and patient stated it is rubbing and bleeding

## 2022-07-29 NOTE — PROGRESS NOTE ADULT - SUBJECTIVE AND OBJECTIVE BOX
Patient is a 67y old  Female who presents with a chief complaint of ams and confusion (2022 11:09)      INTERVAL HPI/OVERNIGHT EVENTS: feels well today, mental status improved    MEDICATIONS  (STANDING):  atorvastatin 10 milliGRAM(s) Oral at bedtime  cefTRIAXone   IVPB      cefTRIAXone   IVPB 1000 milliGRAM(s) IV Intermittent every 24 hours  lactulose Syrup 20 Gram(s) Oral daily  multivitamin 1 Tablet(s) Oral daily  pantoprazole    Tablet 40 milliGRAM(s) Oral before breakfast  propranolol LA 60 milliGRAM(s) Oral daily  rifAXIMin 550 milliGRAM(s) Oral two times a day    MEDICATIONS  (PRN):  aluminum hydroxide/magnesium hydroxide/simethicone Suspension 30 milliLiter(s) Oral every 4 hours PRN Dyspepsia  morphine  - Injectable 2 milliGRAM(s) IV Push every 6 hours PRN Severe Pain (7 - 10)      Allergies    No Known Allergies    Intolerances        REVIEW OF SYSTEMS:  CONSTITUTIONAL: No fever, weight loss, or fatigue  EYES: No eye pain, visual disturbances  ENMT:  No difficulty hearing, tinnitus, vertigo; No sinus or throat pain  NECK: No pain or stiffness  RESPIRATORY: No cough, wheezing, chills or hemoptysis; No shortness of breath  CARDIOVASCULAR: No chest pain, palpitations, dizziness  GASTROINTESTINAL: No abdominal or epigastric pain. No nausea, vomiting, or hematemesis; No diarrhea or constipation. No melena or hematochezia.  GENITOURINARY: No dysuria, frequency, hematuria, or incontinence  NEUROLOGICAL: No headaches, memory loss, loss of strength, numbness, or tremors  SKIN: No itching, burning  LYMPH NODES: No enlarged glands  MUSCULOSKELETAL: right groin pain  PSYCHIATRIC: No depression, mood swings  HEME/LYMPH: No easy bruising, or bleeding gums  ALLERGY AND IMMUNOLOGIC: No hives    Vital Signs Last 24 Hrs  T(C): 36.2 (2022 05:24), Max: 37.4 (2022 16:30)  T(F): 97.2 (2022 05:24), Max: 99.4 (2022 16:30)  HR: 75 (2022 05:24) (75 - 79)  BP: 107/66 (2022 05:24) (101/61 - 108/60)  BP(mean): --  RR: 18 (2022 05:24) (17 - 18)  SpO2: 91% (2022 05:24) (91% - 95%)    Parameters below as of 2022 05:24  Patient On (Oxygen Delivery Method): room air        PHYSICAL EXAM:  GENERAL: NAD, well-groomed, well-developed  HEAD:  Atraumatic, Normocephalic  EYES: EOMI, PERRLA, conjunctiva and sclera clear  ENMT: No tonsillar erythema, exudates, or enlargement   NECK: Supple, No JVD  NERVOUS SYSTEM:  Alert & Oriented X3, Good concentration  CHEST/LUNG: Clear to auscultation bilaterally; No rales, rhonchi, wheezing  HEART: Regular rate and rhythm  ABDOMEN: Soft, Nontender, Nondistended; Bowel sounds present  EXTREMITIES:  2+ Peripheral Pulses, right groin inguial hernia with right vagina labia swelling   LYMPH: No lymphadenopathy noted  SKIN: No rashes     LABS:                        9.9    6.38  )-----------( 86       ( 2022 07:04 )             29.1     2022 07:04    139    |  108    |  18     ----------------------------<  87     3.8     |  26     |  0.49     Ca    7.8        2022 07:04        Urinalysis Basic - ( 2022 12:00 )    Color: Orange / Appearance: Turbid / S.025 / pH: x  Gluc: x / Ketone: Trace  / Bili: Moderate / Urobili: 8   Blood: x / Protein: 30 mg/dL / Nitrite: Positive   Leuk Esterase: Moderate / RBC: 6-10 /HPF / WBC 11-25   Sq Epi: x / Non Sq Epi: Many / Bacteria: Many      CAPILLARY BLOOD GLUCOSE        blood culture --   No growth to date.    @ 15:15    blood culture --   No growth to date.    @ 15:10      urine culture --   @ 15:15  results   No growth to date.  @ 15:15  urine culture --   @ 15:10  results   No growth to date.  @ 15:10    wound with gram statin --     @ 15:15  organism  --    @ 15:15  specimen source .Blood Blood-Peripheral   @ 15:15  wound with gram statin --     @ 15:10  organism  --    @ 15:10  specimen source .Blood Blood-Peripheral   @ 15:10      RADIOLOGY & ADDITIONAL TESTS:      Consultant(s) Notes Reviewed:  [ x] YES  [ ] NO    Care Discussed with Consultants/Other Providers [ x] YES  [ ] NO    Advanced care planning discussed with patient and family, advanced care planning forms reviewed, discussed, and completed.  20 minutes spent.

## 2022-07-29 NOTE — PROGRESS NOTE ADULT - SUBJECTIVE AND OBJECTIVE BOX
HPI FROM ADMISSION:  68 yo F with a PMH of cirrhosis, hep c, hepatic encephalopathy, htn, hld presenting with concerns for altered mental status and fatigue.  History obtained from patient .  Over the past 2 days he had noted her to be decompensating.  States that she has not been walking as well and has been more tired. no reported falls per . Unsure if she had bowel movement 1 to 2 days ago.  Supposed be on lactulose which she reports she is taking.  She has not had any vomiting or any other complaints.  No reported pain.  No fevers at home.  She had a TIPS procedure 2 years ago due to alcoholic cirrhosis.  She has not had a drink in about 15 years.  Follows with Dr. Sue for pcp.    Interval HPI:  68 yo F presenting with a right inguinal hernia. Pt reports that she first noticed the hernia about 2 years ago and it has progressively worsened, increasing in size when she stands upright. She denies any abdominal pain, nausea, vomiting, fever, chills, changes in bowel movements, or problems urinating.     PAST MEDICAL & SURGICAL HISTORY:  Inguinal hernia      Umbilical hernia      HTN (hypertension)      HLD (hyperlipidemia)      Cirrhosis      Hepatitis C      S/P TIPS (transjugular intrahepatic portosystemic shunt)          MEDICATIONS  (STANDING):  atorvastatin 10 milliGRAM(s) Oral at bedtime  cefTRIAXone   IVPB      cefTRIAXone   IVPB 1000 milliGRAM(s) IV Intermittent every 24 hours  lactulose Syrup 20 Gram(s) Oral daily  multivitamin 1 Tablet(s) Oral daily  pantoprazole    Tablet 40 milliGRAM(s) Oral before breakfast  propranolol LA 60 milliGRAM(s) Oral daily  rifAXIMin 550 milliGRAM(s) Oral two times a day    MEDICATIONS  (PRN):  aluminum hydroxide/magnesium hydroxide/simethicone Suspension 30 milliLiter(s) Oral every 4 hours PRN Dyspepsia  morphine  - Injectable 2 milliGRAM(s) IV Push every 6 hours PRN Severe Pain (7 - 10)      Allergies    No Known Allergies    Intolerances        SOCIAL HISTORY:    FAMILY HISTORY:  FH: diabetes mellitus (Father)            Physical Exam:  General: NAD, resting comfortably  HEENT: NC/AT, EOMI, normal hearing, no oral lesions, no LAD, neck supple  Pulmonary: normal resp effort, CTA-B  Cardiovascular: NSR, no murmurs  Abdominal: soft, ND/NT, no organomegaly  Extremities: WWP, normal strength, no clubbing/cyanosis/edema  Neuro: A/O x 3, CNs II-XII grossly intact, normal sensation, no focal deficits  Pulses: palpable distal pulses    Vital Signs Last 24 Hrs  T(C): 36.2 (2022 05:24), Max: 37.4 (2022 16:30)  T(F): 97.2 (2022 05:24), Max: 99.4 (2022 16:30)  HR: 75 (2022 05:24) (75 - 79)  BP: 121/67 (2022 11:47) (101/61 - 121/67)  BP(mean): --  RR: 18 (2022 05:24) (17 - 18)  SpO2: 91% (2022 05:24) (91% - 95%)    Parameters below as of 2022 05:24  Patient On (Oxygen Delivery Method): room air        I&O's Summary    2022 07:01  -  2022 07:00  --------------------------------------------------------  IN: 900 mL / OUT: 0 mL / NET: 900 mL            LABS:                        9.9    6.38  )-----------( 86       ( 2022 07:04 )             29.1     07-29    139  |  108  |  18  ----------------------------<  87  3.8   |  26  |  0.49<L>    Ca    7.8<L>      2022 07:04  Mg     1.7     07-    TPro  5.3<L>  /  Alb  1.6<L>  /  TBili  5.1<H>  /  DBili  x   /  AST  48<H>  /  ALT  18  /  AlkPhos  198<H>  28      Urinalysis Basic - ( 2022 12:00 )    Color: Orange / Appearance: Turbid / S.025 / pH: x  Gluc: x / Ketone: Trace  / Bili: Moderate / Urobili: 8   Blood: x / Protein: 30 mg/dL / Nitrite: Positive   Leuk Esterase: Moderate / RBC: 6-10 /HPF / WBC 11-25   Sq Epi: x / Non Sq Epi: Many / Bacteria: Many      CAPILLARY BLOOD GLUCOSE        LIVER FUNCTIONS - ( 2022 06:55 )  Alb: 1.6 g/dL / Pro: 5.3 g/dL / ALK PHOS: 198 U/L / ALT: 18 U/L / AST: 48 U/L / GGT: x             Cultures:  Culture Results:   No growth to date. ( @ 15:15)  Culture Results:   No growth to date. ( @ 15:10)      RADIOLOGY & ADDITIONAL STUDIES:      Plan:           HPI FROM ADMISSION:  68 yo F with a PMH of cirrhosis, hep c, hepatic encephalopathy, htn, hld presenting with concerns for altered mental status and fatigue.  History obtained from patient .  Over the past 2 days he had noted her to be decompensating.  States that she has not been walking as well and has been more tired. no reported falls per . Unsure if she had bowel movement 1 to 2 days ago.  Supposed be on lactulose which she reports she is taking.  She has not had any vomiting or any other complaints.  No reported pain.  No fevers at home.  She had a TIPS procedure 2 years ago due to alcoholic cirrhosis.  She has not had a drink in about 15 years.  Follows with Dr. Sue for pcp.    Interval HPI:  68 yo F presenting with a right inguinal hernia. Pt reports that she first noticed the hernia about 2 years ago and it has progressively worsened, increasing in size when she stands upright. She denies any abdominal pain, nausea, vomiting, fever, chills, changes in bowel movements, or problems urinating. Pt reports that she had presented with the hernia at an outside hospital previously and it was not fixed at that time. Pt denies that the hernia affects her daily functioning.     PAST MEDICAL & SURGICAL HISTORY:  Inguinal hernia      Umbilical hernia      HTN (hypertension)      HLD (hyperlipidemia)      Cirrhosis      Hepatitis C      S/P TIPS (transjugular intrahepatic portosystemic shunt)          MEDICATIONS  (STANDING):  atorvastatin 10 milliGRAM(s) Oral at bedtime  cefTRIAXone   IVPB      cefTRIAXone   IVPB 1000 milliGRAM(s) IV Intermittent every 24 hours  lactulose Syrup 20 Gram(s) Oral daily  multivitamin 1 Tablet(s) Oral daily  pantoprazole    Tablet 40 milliGRAM(s) Oral before breakfast  propranolol LA 60 milliGRAM(s) Oral daily  rifAXIMin 550 milliGRAM(s) Oral two times a day    MEDICATIONS  (PRN):  aluminum hydroxide/magnesium hydroxide/simethicone Suspension 30 milliLiter(s) Oral every 4 hours PRN Dyspepsia  morphine  - Injectable 2 milliGRAM(s) IV Push every 6 hours PRN Severe Pain (7 - 10)      Allergies    No Known Allergies    Intolerances        SOCIAL HISTORY:    FAMILY HISTORY:  FH: diabetes mellitus (Father)            Physical Exam:  General: NAD, resting comfortably  Pulmonary: normal resp effort on room air  Cardiovascular: NSR, no murmurs  Abdominal: soft, ND/NT, no organomegaly  Genitourinary: Large reducible R inguinal hernia. Easily reducible to palpation  Neuro: A/O x 3    Vital Signs Last 24 Hrs  T(C): 36.2 (2022 05:24), Max: 37.4 (2022 16:30)  T(F): 97.2 (2022 05:24), Max: 99.4 (2022 16:30)  HR: 75 (2022 05:24) (75 - 79)  BP: 121/67 (2022 11:47) (101/61 - 121/67)  BP(mean): --  RR: 18 (2022 05:24) (17 - 18)  SpO2: 91% (2022 05:24) (91% - 95%)    Parameters below as of 2022 05:24  Patient On (Oxygen Delivery Method): room air        I&O's Summary    2022 07:01  -  2022 07:00  --------------------------------------------------------  IN: 900 mL / OUT: 0 mL / NET: 900 mL            LABS:                        9.9    6.38  )-----------( 86       ( 2022 07:04 )             29.1     07-    139  |  108  |  18  ----------------------------<  87  3.8   |  26  |  0.49<L>    Ca    7.8<L>      2022 07:04  Mg     1.7     07-    TPro  5.3<L>  /  Alb  1.6<L>  /  TBili  5.1<H>  /  DBili  x   /  AST  48<H>  /  ALT  18  /  AlkPhos  198<H>  28      Urinalysis Basic - ( 2022 12:00 )    Color: Orange / Appearance: Turbid / S.025 / pH: x  Gluc: x / Ketone: Trace  / Bili: Moderate / Urobili: 8   Blood: x / Protein: 30 mg/dL / Nitrite: Positive   Leuk Esterase: Moderate / RBC: 6-10 /HPF / WBC 11-25   Sq Epi: x / Non Sq Epi: Many / Bacteria: Many      CAPILLARY BLOOD GLUCOSE        LIVER FUNCTIONS - ( 2022 06:55 )  Alb: 1.6 g/dL / Pro: 5.3 g/dL / ALK PHOS: 198 U/L / ALT: 18 U/L / AST: 48 U/L / GGT: x             Cultures:  Culture Results:   No growth to date. ( @ 15:15)  Culture Results:   No growth to date. ( @ 15:10)      RADIOLOGY & ADDITIONAL STUDIES:

## 2022-07-29 NOTE — PHYSICAL THERAPY INITIAL EVALUATION ADULT - GENERAL OBSERVATIONS, REHAB EVAL
Patient was received and left supine (as per patient request) with IV in place, call bell in reach, bed alarm activated and RN present.

## 2022-07-29 NOTE — PROGRESS NOTE ADULT - SUBJECTIVE AND OBJECTIVE BOX
[facility]  Montez Gordon PA-C  [address] [place], [place] [id number] [phone]      INTERVAL HPI/OVERNIGHT EVENTS:  Pt s/e  Pt reports +BM this morning  Tolerating diet  Denies further GI complaints  AOx3    MEDICATIONS  (STANDING):  atorvastatin 10 milliGRAM(s) Oral at bedtime  cefTRIAXone   IVPB      cefTRIAXone   IVPB 1000 milliGRAM(s) IV Intermittent every 24 hours  lactulose Syrup 20 Gram(s) Oral daily  multivitamin 1 Tablet(s) Oral daily  pantoprazole    Tablet 40 milliGRAM(s) Oral before breakfast  propranolol LA 60 milliGRAM(s) Oral daily  rifAXIMin 550 milliGRAM(s) Oral two times a day  sodium chloride 0.9%. 1000 milliLiter(s) (75 mL/Hr) IV Continuous <Continuous>    MEDICATIONS  (PRN):  aluminum hydroxide/magnesium hydroxide/simethicone Suspension 30 milliLiter(s) Oral every 4 hours PRN Dyspepsia  morphine  - Injectable 2 milliGRAM(s) IV Push every 6 hours PRN Severe Pain (7 - 10)      Allergies  No Known Allergies    PHYSICAL EXAM:   Vital Signs:  Vital Signs Last 24 Hrs  T(C): 36.2 (2022 05:24), Max: 37.4 (2022 16:30)  T(F): 97.2 (2022 05:24), Max: 99.4 (2022 16:30)  HR: 75 (2022 05:24) (75 - 79)  BP: 107/66 (2022 05:24) (101/61 - 108/60)  BP(mean): --  RR: 18 (2022 05:24) (17 - 18)  SpO2: 91% (2022 05:24) (91% - 95%)    Parameters below as of 2022 05:24  Patient On (Oxygen Delivery Method): room air      Daily     Daily Weight in k.4 (2022 17:44)    GENERAL:  Appears stated age  ABDOMEN:  Soft, non-tender, non-distended  NEURO:  Alert      LABS:                        9.9    6.38  )-----------( 86       ( 2022 07:04 )             29.1     07-    139  |  108  |  18  ----------------------------<  87  3.8   |  26  |  0.49<L>    Ca    7.8<L>      2022 07:04  Mg     1.7         TPro  5.3<L>  /  Alb  1.6<L>  /  TBili  5.1<H>  /  DBili  x   /  AST  48<H>  /  ALT  18  /  AlkPhos  198<H>        Urinalysis Basic - ( 2022 12:00 )    Color: Orange / Appearance: Turbid / S.025 / pH: x  Gluc: x / Ketone: Trace  / Bili: Moderate / Urobili: 8   Blood: x / Protein: 30 mg/dL / Nitrite: Positive   Leuk Esterase: Moderate / RBC: 6-10 /HPF / WBC 11-25   Sq Epi: x / Non Sq Epi: Many / Bacteria: Many

## 2022-07-29 NOTE — PHYSICAL THERAPY INITIAL EVALUATION ADULT - ADDITIONAL COMMENTS
Patient has been assisting  with ADLs recently, as he was sick, but is now improving, as per patient.

## 2022-07-30 ENCOUNTER — TRANSCRIPTION ENCOUNTER (OUTPATIENT)
Age: 67
End: 2022-07-30

## 2022-07-30 VITALS
HEART RATE: 66 BPM | SYSTOLIC BLOOD PRESSURE: 108 MMHG | TEMPERATURE: 98 F | OXYGEN SATURATION: 91 % | DIASTOLIC BLOOD PRESSURE: 70 MMHG | RESPIRATION RATE: 18 BRPM

## 2022-07-30 LAB
ANION GAP SERPL CALC-SCNC: 4 MMOL/L — LOW (ref 5–17)
BILIRUB SERPL-MCNC: 4.2 MG/DL — HIGH (ref 0.2–1.2)
BUN SERPL-MCNC: 14 MG/DL — SIGNIFICANT CHANGE UP (ref 7–23)
CALCIUM SERPL-MCNC: 8 MG/DL — LOW (ref 8.5–10.1)
CHLORIDE SERPL-SCNC: 109 MMOL/L — HIGH (ref 96–108)
CO2 SERPL-SCNC: 26 MMOL/L — SIGNIFICANT CHANGE UP (ref 22–31)
CREAT SERPL-MCNC: 0.44 MG/DL — LOW (ref 0.5–1.3)
EGFR: 107 ML/MIN/1.73M2 — SIGNIFICANT CHANGE UP
GLUCOSE SERPL-MCNC: 82 MG/DL — SIGNIFICANT CHANGE UP (ref 70–99)
HCT VFR BLD CALC: 31.7 % — LOW (ref 34.5–45)
HGB BLD-MCNC: 10.7 G/DL — LOW (ref 11.5–15.5)
INR BLD: 1.97 RATIO — HIGH (ref 0.88–1.16)
MCHC RBC-ENTMCNC: 33.8 GM/DL — SIGNIFICANT CHANGE UP (ref 32–36)
MCHC RBC-ENTMCNC: 38.4 PG — HIGH (ref 27–34)
MCV RBC AUTO: 113.6 FL — HIGH (ref 80–100)
MELD SCORE WITH DIALYSIS: 33 POINTS — SIGNIFICANT CHANGE UP
MELD SCORE WITHOUT DIALYSIS: 19 POINTS — SIGNIFICANT CHANGE UP
NRBC # BLD: 0 /100 WBCS — SIGNIFICANT CHANGE UP (ref 0–0)
PLATELET # BLD AUTO: 92 K/UL — LOW (ref 150–400)
POTASSIUM SERPL-MCNC: 3.9 MMOL/L — SIGNIFICANT CHANGE UP (ref 3.5–5.3)
POTASSIUM SERPL-SCNC: 3.9 MMOL/L — SIGNIFICANT CHANGE UP (ref 3.5–5.3)
PROTHROM AB SERPL-ACNC: 23.2 SEC — HIGH (ref 10.5–13.4)
RBC # BLD: 2.79 M/UL — LOW (ref 3.8–5.2)
RBC # FLD: 16.5 % — HIGH (ref 10.3–14.5)
SODIUM SERPL-SCNC: 139 MMOL/L — SIGNIFICANT CHANGE UP (ref 135–145)
WBC # BLD: 6.56 K/UL — SIGNIFICANT CHANGE UP (ref 3.8–10.5)
WBC # FLD AUTO: 6.56 K/UL — SIGNIFICANT CHANGE UP (ref 3.8–10.5)

## 2022-07-30 PROCEDURE — 99231 SBSQ HOSP IP/OBS SF/LOW 25: CPT

## 2022-07-30 PROCEDURE — 80053 COMPREHEN METABOLIC PANEL: CPT

## 2022-07-30 PROCEDURE — 83735 ASSAY OF MAGNESIUM: CPT

## 2022-07-30 PROCEDURE — 71045 X-RAY EXAM CHEST 1 VIEW: CPT

## 2022-07-30 PROCEDURE — 83605 ASSAY OF LACTIC ACID: CPT

## 2022-07-30 PROCEDURE — 97161 PT EVAL LOW COMPLEX 20 MIN: CPT

## 2022-07-30 PROCEDURE — 82140 ASSAY OF AMMONIA: CPT

## 2022-07-30 PROCEDURE — 80048 BASIC METABOLIC PNL TOTAL CA: CPT

## 2022-07-30 PROCEDURE — 87040 BLOOD CULTURE FOR BACTERIA: CPT

## 2022-07-30 PROCEDURE — 85025 COMPLETE CBC W/AUTO DIFF WBC: CPT

## 2022-07-30 PROCEDURE — 93005 ELECTROCARDIOGRAM TRACING: CPT

## 2022-07-30 PROCEDURE — 99285 EMERGENCY DEPT VISIT HI MDM: CPT | Mod: 25

## 2022-07-30 PROCEDURE — G1004: CPT

## 2022-07-30 PROCEDURE — 87635 SARS-COV-2 COVID-19 AMP PRB: CPT

## 2022-07-30 PROCEDURE — 36415 COLL VENOUS BLD VENIPUNCTURE: CPT

## 2022-07-30 PROCEDURE — 80307 DRUG TEST PRSMV CHEM ANLYZR: CPT

## 2022-07-30 PROCEDURE — 70450 CT HEAD/BRAIN W/O DYE: CPT | Mod: MG

## 2022-07-30 PROCEDURE — 87086 URINE CULTURE/COLONY COUNT: CPT

## 2022-07-30 PROCEDURE — 85027 COMPLETE CBC AUTOMATED: CPT

## 2022-07-30 PROCEDURE — 84443 ASSAY THYROID STIM HORMONE: CPT

## 2022-07-30 PROCEDURE — 83690 ASSAY OF LIPASE: CPT

## 2022-07-30 PROCEDURE — 82962 GLUCOSE BLOOD TEST: CPT

## 2022-07-30 PROCEDURE — 81001 URINALYSIS AUTO W/SCOPE: CPT

## 2022-07-30 RX ORDER — LACTULOSE 10 G/15ML
30 SOLUTION ORAL
Qty: 0 | Refills: 0 | DISCHARGE

## 2022-07-30 RX ORDER — ZINC SULFATE TAB 220 MG (50 MG ZINC EQUIVALENT) 220 (50 ZN) MG
1 TAB ORAL
Qty: 0 | Refills: 0 | DISCHARGE

## 2022-07-30 RX ORDER — MILK THISTLE 150 MG
1 CAPSULE ORAL
Qty: 0 | Refills: 0 | DISCHARGE

## 2022-07-30 RX ORDER — CHOLECALCIFEROL (VITAMIN D3) 125 MCG
1 CAPSULE ORAL
Qty: 0 | Refills: 0 | DISCHARGE

## 2022-07-30 RX ADMIN — PANTOPRAZOLE SODIUM 40 MILLIGRAM(S): 20 TABLET, DELAYED RELEASE ORAL at 06:02

## 2022-07-30 RX ADMIN — Medication 1 TABLET(S): at 11:19

## 2022-07-30 RX ADMIN — LACTULOSE 20 GRAM(S): 10 SOLUTION ORAL at 11:19

## 2022-07-30 RX ADMIN — Medication 60 MILLIGRAM(S): at 06:02

## 2022-07-30 RX ADMIN — CEFTRIAXONE 100 MILLIGRAM(S): 500 INJECTION, POWDER, FOR SOLUTION INTRAMUSCULAR; INTRAVENOUS at 11:19

## 2022-07-30 NOTE — DISCHARGE NOTE NURSING/CASE MANAGEMENT/SOCIAL WORK - NSDCPEFALRISK_GEN_ALL_CORE
For information on Fall & Injury Prevention, visit: [email or web address] OR  [email or web address] OR  https://www.cdc.gov/steadi/patient.html

## 2022-07-30 NOTE — DISCHARGE NOTE PROVIDER - CARE PROVIDER_API CALL
Eli Bell)  Gastroenterology; Internal Medicine  [address] [place] [address]  Phone: [phone]  Fax: [phone]  Follow Up Time: 1 week

## 2022-07-30 NOTE — DISCHARGE NOTE PROVIDER - NSDCMRMEDTOKEN_GEN_ALL_CORE_FT
lactulose 10 g/15 mL oral syrup: 30 milliliter(s) orally once a day (at bedtime)  multivitamin: 1 tab(s) orally once a day  pravastatin 40 mg oral tablet: 1 tab(s) orally once a day  propranolol 60 mg oral capsule, extended release: 1 cap(s) orally once a day  spironolactone 25 mg oral tablet: 1 tab(s) orally once a day  Xifaxan 550 mg oral tablet: 1 tab(s) orally 2 times a day

## 2022-07-30 NOTE — DISCHARGE NOTE PROVIDER - NSDCCPCAREPLAN_GEN_ALL_CORE_FT
PRINCIPAL DISCHARGE DIAGNOSIS  Diagnosis: Change in mental status  Assessment and Plan of Treatment: due to encephalpathy  stable  continue lacutlose daily  fu with gi      SECONDARY DISCHARGE DIAGNOSES  Diagnosis: Hepatic encephalopathy  Assessment and Plan of Treatment:

## 2022-07-30 NOTE — PROGRESS NOTE ADULT - ASSESSMENT
RIH with hx of cirrhosis     conservative management of RIH
AMS  Elevated ammonia  ?Hepatic encephalopathy    AMS resolved, now at baseline mental status  +BM  Continue lactulose and xifaxin  referred to transplant hepatologist  d/w pt    I reviewed the overnight course of events on the unit, re-confirming the patient history. I discussed the care with the patient and their family  Differential diagnosis and plan of care discussed with patient after the evaluation  35 minutes spent on total encounter of which more than fifty percent of the encounter was spent counseling and/or coordinating care by the attending physician.  Advanced care planning was discussed with patient and family.  Advanced care planning forms were reviewed and discussed.  Risks, benefits and alternatives of gastroenterologic procedures were discussed in detail and all questions were answered.    
AMS  Elevated ammonia  ?Hepatic encephalopathy    AMS resolved, now at baseline mental status  +BM  Continue lactulose and xifaxin  referred to transplant hepatologist  no GI objection to d/c plan    I reviewed the overnight course of events on the unit, re-confirming the patient history. I discussed the care with the patient and their family  Differential diagnosis and plan of care discussed with patient after the evaluation  35 minutes spent on total encounter of which more than fifty percent of the encounter was spent counseling and/or coordinating care by the attending physician.  Advanced care planning was discussed with patient and family.  Advanced care planning forms were reviewed and discussed.  Risks, benefits and alternatives of gastroenterologic procedures were discussed in detail and all questions were answered.  
66 yo F with a PMH of alcoholic cirrhosis, hepatic encephalopathy with a MELD score >15 consulted for a reducible, right inguinal hernia. Hernia is large and easily reducible to palpation. Pt is not experiencing abdominal pain, nausea, vomiting, and has no signs of bowel obstruction. No skin changes present at the site of the hernia. With pt's lack of symptoms, easily reducible nature of the hernia, and past medical history, there is no acute indication for surgery at this time.       PLAN:   -No indication for surgery at this time due to high surgical risk and non-urgent nature of the hernia, recommend outpatient transplant surgery evaluation  -F/u with PCP after discharge  -F/u in surgery clinic outpatient as needed  -Continue supportive care per primary team  -PT/OT for chronic back pain, ambulation  -IVF and electrolyte repletion as needed

## 2022-07-30 NOTE — DISCHARGE NOTE NURSING/CASE MANAGEMENT/SOCIAL WORK - PATIENT PORTAL LINK FT
You can access the FollowMyHealth Patient Portal offered by [facility] by registering at the following website: [email or web address]. By joining [facility]’s FollowMyHealth portal, you will also be able to view your health information using other applications (apps) compatible with our system.

## 2022-07-30 NOTE — PROGRESS NOTE ADULT - SUBJECTIVE AND OBJECTIVE BOX
pt seen  doing well  c/o back pain  no pain from hernia  ICU Vital Signs Last 24 Hrs  T(C): 36.7 (30 Jul 2022 05:15), Max: 36.8 (29 Jul 2022 13:03)  T(F): 98 (30 Jul 2022 05:15), Max: 98.3 (29 Jul 2022 13:03)  HR: 80 (30 Jul 2022 05:15) (77 - 80)  BP: 109/74 (30 Jul 2022 05:15) (109/74 - 121/67)  BP(mean): --  ABP: --  ABP(mean): --  RR: 18 (30 Jul 2022 05:15) (18 - 18)  SpO2: 94% (30 Jul 2022 05:15) (90% - 94%)    O2 Parameters below as of 30 Jul 2022 05:15  Patient On (Oxygen Delivery Method): room air        gen-NAD  icterus  resp-clear  abd-soft ND, reduicble [facility]

## 2022-07-30 NOTE — DISCHARGE NOTE PROVIDER - HOSPITAL COURSE
Hepatic Encephalopathy  Hx of Hep C  UTI  HTN  Inguinal Hernia - Reducable    stable for dc with outpt GI fu    >35 minutes spent on discharge

## 2022-07-30 NOTE — PROGRESS NOTE ADULT - SUBJECTIVE AND OBJECTIVE BOX
[facility]  Montez Gordon PA-C  [address] [place], [place] [id number] [phone]      INTERVAL HPI/OVERNIGHT EVENTS:  pt s/e  AOx3, +BM  No further GI complaints    MEDICATIONS  (STANDING):  atorvastatin 10 milliGRAM(s) Oral at bedtime  cefTRIAXone   IVPB      cefTRIAXone   IVPB 1000 milliGRAM(s) IV Intermittent every 24 hours  lactulose Syrup 20 Gram(s) Oral daily  multivitamin 1 Tablet(s) Oral daily  pantoprazole    Tablet 40 milliGRAM(s) Oral before breakfast  propranolol LA 60 milliGRAM(s) Oral daily  rifAXIMin 550 milliGRAM(s) Oral two times a day    MEDICATIONS  (PRN):  aluminum hydroxide/magnesium hydroxide/simethicone Suspension 30 milliLiter(s) Oral every 4 hours PRN Dyspepsia  morphine  - Injectable 2 milliGRAM(s) IV Push every 6 hours PRN Severe Pain (7 - 10)      Allergies    No Known Allergies    Intolerances        PHYSICAL EXAM:   Vital Signs:  Vital Signs Last 24 Hrs  T(C): 36.7 (30 Jul 2022 05:15), Max: 36.8 (29 Jul 2022 13:03)  T(F): 98 (30 Jul 2022 05:15), Max: 98.3 (29 Jul 2022 13:03)  HR: 80 (30 Jul 2022 05:15) (77 - 80)  BP: 109/74 (30 Jul 2022 05:15) (109/74 - 119/72)  BP(mean): --  RR: 18 (30 Jul 2022 05:15) (18 - 18)  SpO2: 94% (30 Jul 2022 05:15) (90% - 94%)    Parameters below as of 30 Jul 2022 05:15  Patient On (Oxygen Delivery Method): room air    GENERAL:  Appears stated age  ABDOMEN:  Soft, non-tender, non-distended  NEURO:  Alert      LABS:                        10.7   6.56  )-----------( 92       ( 30 Jul 2022 06:48 )             31.7     07-30    139  |  109<H>  |  14  ----------------------------<  82  3.9   |  26  |  0.44<L>    Ca    8.0<L>      30 Jul 2022 06:48    TPro  x   /  Alb  x   /  TBili  4.2<H>  /  DBili  x   /  AST  x   /  ALT  x   /  AlkPhos  x   07-30    PT/INR - ( 30 Jul 2022 06:48 )   PT: 23.2 sec;   INR: 1.97 ratio

## 2022-08-02 LAB
CULTURE RESULTS: SIGNIFICANT CHANGE UP
CULTURE RESULTS: SIGNIFICANT CHANGE UP
SPECIMEN SOURCE: SIGNIFICANT CHANGE UP
SPECIMEN SOURCE: SIGNIFICANT CHANGE UP

## 2022-09-27 NOTE — H&P ADULT - NSCORESITESY/N_GEN_A_CORE_RD
Pt initially felt relief with pain medication, then states her pain went back up to an 8/10. Initiated comfort measures, turned down lights, placed on 2L O2 NC as pt states she felt shortness of breath after meds.    Yes

## 2022-10-05 ENCOUNTER — INPATIENT (INPATIENT)
Facility: HOSPITAL | Age: 67
LOS: 1 days | Discharge: HOME | DRG: 552 | End: 2022-10-07
Attending: INTERNAL MEDICINE | Admitting: INTERNAL MEDICINE
Payer: COMMERCIAL

## 2022-10-05 VITALS
HEIGHT: 66 IN | DIASTOLIC BLOOD PRESSURE: 70 MMHG | OXYGEN SATURATION: 98 % | HEART RATE: 90 BPM | WEIGHT: 145.06 LBS | SYSTOLIC BLOOD PRESSURE: 145 MMHG | RESPIRATION RATE: 16 BRPM | TEMPERATURE: 98 F

## 2022-10-05 DIAGNOSIS — Z95.828 PRESENCE OF OTHER VASCULAR IMPLANTS AND GRAFTS: Chronic | ICD-10-CM

## 2022-10-05 DIAGNOSIS — S32.000A WEDGE COMPRESSION FRACTURE OF UNSPECIFIED LUMBAR VERTEBRA, INITIAL ENCOUNTER FOR CLOSED FRACTURE: ICD-10-CM

## 2022-10-05 LAB
ALBUMIN SERPL ELPH-MCNC: 1.9 G/DL — LOW (ref 3.3–5)
ALP SERPL-CCNC: 280 U/L — HIGH (ref 40–120)
ALT FLD-CCNC: 33 U/L — SIGNIFICANT CHANGE UP (ref 12–78)
AMMONIA BLD-MCNC: <17 UMOL/L — SIGNIFICANT CHANGE UP (ref 11–32)
ANION GAP SERPL CALC-SCNC: 7 MMOL/L — SIGNIFICANT CHANGE UP (ref 5–17)
APTT BLD: 36.7 SEC — HIGH (ref 27.5–35.5)
AST SERPL-CCNC: 72 U/L — HIGH (ref 15–37)
BASOPHILS # BLD AUTO: 0.06 K/UL — SIGNIFICANT CHANGE UP (ref 0–0.2)
BASOPHILS NFR BLD AUTO: 1.3 % — SIGNIFICANT CHANGE UP (ref 0–2)
BILIRUB SERPL-MCNC: 7.2 MG/DL — HIGH (ref 0.2–1.2)
BUN SERPL-MCNC: 12 MG/DL — SIGNIFICANT CHANGE UP (ref 7–23)
CALCIUM SERPL-MCNC: 8.6 MG/DL — SIGNIFICANT CHANGE UP (ref 8.5–10.1)
CHLORIDE SERPL-SCNC: 114 MMOL/L — HIGH (ref 96–108)
CO2 SERPL-SCNC: 24 MMOL/L — SIGNIFICANT CHANGE UP (ref 22–31)
CREAT SERPL-MCNC: 0.68 MG/DL — SIGNIFICANT CHANGE UP (ref 0.5–1.3)
EGFR: 97 ML/MIN/1.73M2 — SIGNIFICANT CHANGE UP
EOSINOPHIL # BLD AUTO: 0.1 K/UL — SIGNIFICANT CHANGE UP (ref 0–0.5)
EOSINOPHIL NFR BLD AUTO: 2.1 % — SIGNIFICANT CHANGE UP (ref 0–6)
GLUCOSE SERPL-MCNC: 95 MG/DL — SIGNIFICANT CHANGE UP (ref 70–99)
HCT VFR BLD CALC: 35.3 % — SIGNIFICANT CHANGE UP (ref 34.5–45)
HGB BLD-MCNC: 12 G/DL — SIGNIFICANT CHANGE UP (ref 11.5–15.5)
IMM GRANULOCYTES NFR BLD AUTO: 0.2 % — SIGNIFICANT CHANGE UP (ref 0–0.9)
INR BLD: 1.82 RATIO — HIGH (ref 0.88–1.16)
LACTATE SERPL-SCNC: 1.8 MMOL/L — SIGNIFICANT CHANGE UP (ref 0.7–2)
LACTATE SERPL-SCNC: 2.3 MMOL/L — HIGH (ref 0.7–2)
LACTATE SERPL-SCNC: 3.1 MMOL/L — HIGH (ref 0.7–2)
LYMPHOCYTES # BLD AUTO: 1.6 K/UL — SIGNIFICANT CHANGE UP (ref 1–3.3)
LYMPHOCYTES # BLD AUTO: 33.4 % — SIGNIFICANT CHANGE UP (ref 13–44)
MCHC RBC-ENTMCNC: 34 GM/DL — SIGNIFICANT CHANGE UP (ref 32–36)
MCHC RBC-ENTMCNC: 37.4 PG — HIGH (ref 27–34)
MCV RBC AUTO: 110 FL — HIGH (ref 80–100)
MONOCYTES # BLD AUTO: 0.64 K/UL — SIGNIFICANT CHANGE UP (ref 0–0.9)
MONOCYTES NFR BLD AUTO: 13.4 % — SIGNIFICANT CHANGE UP (ref 2–14)
NEUTROPHILS # BLD AUTO: 2.38 K/UL — SIGNIFICANT CHANGE UP (ref 1.8–7.4)
NEUTROPHILS NFR BLD AUTO: 49.6 % — SIGNIFICANT CHANGE UP (ref 43–77)
NRBC # BLD: 0 /100 WBCS — SIGNIFICANT CHANGE UP (ref 0–0)
PLATELET # BLD AUTO: 108 K/UL — LOW (ref 150–400)
POTASSIUM SERPL-MCNC: 3.6 MMOL/L — SIGNIFICANT CHANGE UP (ref 3.5–5.3)
POTASSIUM SERPL-SCNC: 3.6 MMOL/L — SIGNIFICANT CHANGE UP (ref 3.5–5.3)
PROT SERPL-MCNC: 6.7 G/DL — SIGNIFICANT CHANGE UP (ref 6–8.3)
PROTHROM AB SERPL-ACNC: 21.4 SEC — HIGH (ref 10.5–13.4)
RAPID RVP RESULT: SIGNIFICANT CHANGE UP
RBC # BLD: 3.21 M/UL — LOW (ref 3.8–5.2)
RBC # FLD: 16.2 % — HIGH (ref 10.3–14.5)
SARS-COV-2 RNA SPEC QL NAA+PROBE: SIGNIFICANT CHANGE UP
SODIUM SERPL-SCNC: 145 MMOL/L — SIGNIFICANT CHANGE UP (ref 135–145)
WBC # BLD: 4.79 K/UL — SIGNIFICANT CHANGE UP (ref 3.8–10.5)
WBC # FLD AUTO: 4.79 K/UL — SIGNIFICANT CHANGE UP (ref 3.8–10.5)

## 2022-10-05 PROCEDURE — 70450 CT HEAD/BRAIN W/O DYE: CPT | Mod: 26,MA

## 2022-10-05 PROCEDURE — 74177 CT ABD & PELVIS W/CONTRAST: CPT | Mod: 26,MA

## 2022-10-05 PROCEDURE — 71260 CT THORAX DX C+: CPT | Mod: 26,MA

## 2022-10-05 PROCEDURE — 72131 CT LUMBAR SPINE W/O DYE: CPT | Mod: 26,MA

## 2022-10-05 PROCEDURE — 99285 EMERGENCY DEPT VISIT HI MDM: CPT

## 2022-10-05 RX ORDER — PROPRANOLOL HCL 160 MG
60 CAPSULE, EXTENDED RELEASE 24HR ORAL DAILY
Refills: 0 | Status: DISCONTINUED | OUTPATIENT
Start: 2022-10-05 | End: 2022-10-07

## 2022-10-05 RX ORDER — MORPHINE SULFATE 50 MG/1
2 CAPSULE, EXTENDED RELEASE ORAL EVERY 4 HOURS
Refills: 0 | Status: DISCONTINUED | OUTPATIENT
Start: 2022-10-05 | End: 2022-10-07

## 2022-10-05 RX ORDER — SPIRONOLACTONE 25 MG/1
25 TABLET, FILM COATED ORAL DAILY
Refills: 0 | Status: DISCONTINUED | OUTPATIENT
Start: 2022-10-05 | End: 2022-10-07

## 2022-10-05 RX ORDER — LACTULOSE 10 G/15ML
20 SOLUTION ORAL AT BEDTIME
Refills: 0 | Status: DISCONTINUED | OUTPATIENT
Start: 2022-10-05 | End: 2022-10-07

## 2022-10-05 RX ORDER — ATORVASTATIN CALCIUM 80 MG/1
10 TABLET, FILM COATED ORAL AT BEDTIME
Refills: 0 | Status: DISCONTINUED | OUTPATIENT
Start: 2022-10-05 | End: 2022-10-07

## 2022-10-05 RX ORDER — SODIUM CHLORIDE 9 MG/ML
1000 INJECTION INTRAMUSCULAR; INTRAVENOUS; SUBCUTANEOUS ONCE
Refills: 0 | Status: COMPLETED | OUTPATIENT
Start: 2022-10-05 | End: 2022-10-05

## 2022-10-05 RX ADMIN — SODIUM CHLORIDE 1000 MILLILITER(S): 9 INJECTION INTRAMUSCULAR; INTRAVENOUS; SUBCUTANEOUS at 17:59

## 2022-10-05 RX ADMIN — SODIUM CHLORIDE 1000 MILLILITER(S): 9 INJECTION INTRAMUSCULAR; INTRAVENOUS; SUBCUTANEOUS at 16:20

## 2022-10-05 RX ADMIN — SODIUM CHLORIDE 1000 MILLILITER(S): 9 INJECTION INTRAMUSCULAR; INTRAVENOUS; SUBCUTANEOUS at 15:13

## 2022-10-05 NOTE — ED PROVIDER NOTE - PROGRESS NOTE DETAILS
Lactic cleared, pt afebrile nontoxic appearing, found to have acute L1 compression fracture, spoke with ortho, pt nonambualrtory due topain. Advised to jacob for MRI and TLSO brace. Case presented to Dr urrutia who accepts pt for admission Lactic cleared, pt afebrile nontoxic appearing, found to have acute L1 compression fracture, spoke with ortho, pt nonambulatory due to pain. Advised to jacob for MRI and TLSO brace. Case presented to Dr urrutia who accepts pt for admission

## 2022-10-05 NOTE — ED ADULT TRIAGE NOTE - CHIEF COMPLAINT QUOTE
had cataract surgery done on friday- c/o being lethargic, denies any fever or sick contact- finger stick by ems- 119

## 2022-10-05 NOTE — ED PROVIDER NOTE - CARE PLAN
Principal Discharge DX:	Fracture, lumbar vertebra, compression  Secondary Diagnosis:	Back pain  Secondary Diagnosis:	Unable to ambulate   1

## 2022-10-05 NOTE — ED PROVIDER NOTE - CLINICAL SUMMARY MEDICAL DECISION MAKING FREE TEXT BOX
I, Jose Guadalupe Kong MD, have seen and examined the patient on the date of service.  I agree with the DIMPLE's assessment as written, with exceptions or additions as noted below or in a separate note. pt with pmh of etoh cirrhosis s/p tips, hep c, htn, hld, is presenting with concern for back pain. despite triage note stating weakness, pt not endorsing weakness.  was worried because of patient's back pain that she is not getting out of bed as she usually does. on exam, has lower back ttp. no abd ttp. a/p: pt with hx hepatitis and s/p tips, no abdominal pain at this time to suggest reinjury, however will check with labs. with back pain, possible fx vs strain (no reported trauma). no incontinence or saddle anesthesia to suggest spinal cord pathology. will assess for pyelo vs nephrolithiasis as well. plan for labs/imaging.

## 2022-10-05 NOTE — ED PROVIDER NOTE - OBJECTIVE STATEMENT
67-year-old female with past medical history alcoholic liver cirrhosis status post TIPS procedure, hepatitis C, hypertension hyperlipidemia inguinal hernia presents to the ER with complaints of 2 months of atraumatic left lower back pain.  This is left the patient bedbound.   states patient is more depressed than usual not getting out of bed is concerned that she is going to develop pneumonia so he brought her to the ER.  Patient denies fever chills nausea vomiting diarrhea.  Patient has been taking Motrin for the left-sided back pain with no improvement.  Denies numbness and tingling to the legs denies weakness.  Denies urinary and bladder incontinence.  No saddle anesthesia.  concerned she may have been taking too many medications or missing medication doses.   PCPC Dr Sue

## 2022-10-05 NOTE — ED PROVIDER NOTE - PHYSICAL EXAMINATION
PE:   GEN: Awake, alert, interactive, NAD, non-toxic appearing.  eating candy in bed   HEAD: Atraumatic  EYES: Sclera white, conjunctiva pink, PERRLA  NOSE: Patent, no epistaxis  MOUTH/THROAT: Patent, uvula midline, no tonsillar edema or erythema, no acute dental findings, no oral lesions or ulcerations, no Hoarse voice  CARDIAC: Reg rate and rhythm, S1,S2, no murmur/rub/gallop. Strong central and peripheral pulses, Brisk cap refill, no evident pedal edema.   RESP: No distress noted. L/S clear = Bilat without accessory muscle use, wheeze, rhonchi, rales.   ABD: soft, supple, non-tender, no guarding. BS x 4, normoactive.   NEURO: AOx3, CN II-XII grossly intact without focal deficit.   MSK: Moving all extremities with no apparent deformities. pelvis stable, no midline c/t/l spine TTP, Tenderness to L paraspinal area   SKIN: Warm, dry, normal color, without apparent rashes.

## 2022-10-06 DIAGNOSIS — K74.60 UNSPECIFIED CIRRHOSIS OF LIVER: ICD-10-CM

## 2022-10-06 DIAGNOSIS — M54.16 RADICULOPATHY, LUMBAR REGION: ICD-10-CM

## 2022-10-06 DIAGNOSIS — B19.20 UNSPECIFIED VIRAL HEPATITIS C WITHOUT HEPATIC COMA: ICD-10-CM

## 2022-10-06 LAB
APPEARANCE UR: CLEAR — SIGNIFICANT CHANGE UP
BILIRUB UR-MCNC: ABNORMAL
COLOR SPEC: ABNORMAL
DIFF PNL FLD: ABNORMAL
GLUCOSE UR QL: NEGATIVE — SIGNIFICANT CHANGE UP
HCT VFR BLD CALC: 35.6 % — SIGNIFICANT CHANGE UP (ref 34.5–45)
HGB BLD-MCNC: 12 G/DL — SIGNIFICANT CHANGE UP (ref 11.5–15.5)
KETONES UR-MCNC: ABNORMAL
LEUKOCYTE ESTERASE UR-ACNC: ABNORMAL
MCHC RBC-ENTMCNC: 33.7 GM/DL — SIGNIFICANT CHANGE UP (ref 32–36)
MCHC RBC-ENTMCNC: 38.1 PG — HIGH (ref 27–34)
MCV RBC AUTO: 113 FL — HIGH (ref 80–100)
NITRITE UR-MCNC: NEGATIVE — SIGNIFICANT CHANGE UP
NRBC # BLD: 0 /100 WBCS — SIGNIFICANT CHANGE UP (ref 0–0)
PH UR: 6.5 — SIGNIFICANT CHANGE UP (ref 5–8)
PLATELET # BLD AUTO: 122 K/UL — LOW (ref 150–400)
PROT UR-MCNC: 30 MG/DL
RBC # BLD: 3.15 M/UL — LOW (ref 3.8–5.2)
RBC # FLD: 16.7 % — HIGH (ref 10.3–14.5)
SP GR SPEC: 1.01 — SIGNIFICANT CHANGE UP (ref 1.01–1.02)
UROBILINOGEN FLD QL: 12
WBC # BLD: 6.39 K/UL — SIGNIFICANT CHANGE UP (ref 3.8–10.5)
WBC # FLD AUTO: 6.39 K/UL — SIGNIFICANT CHANGE UP (ref 3.8–10.5)

## 2022-10-06 PROCEDURE — 99221 1ST HOSP IP/OBS SF/LOW 40: CPT

## 2022-10-06 PROCEDURE — 72148 MRI LUMBAR SPINE W/O DYE: CPT | Mod: 26

## 2022-10-06 RX ORDER — KETOROLAC TROMETHAMINE 0.5 %
1 DROPS OPHTHALMIC (EYE)
Refills: 0 | Status: DISCONTINUED | OUTPATIENT
Start: 2022-10-06 | End: 2022-10-07

## 2022-10-06 RX ORDER — MOXIFLOXACIN HCL 0.5 %
1 DROPS OPHTHALMIC (EYE) THREE TIMES A DAY
Refills: 0 | Status: DISCONTINUED | OUTPATIENT
Start: 2022-10-06 | End: 2022-10-07

## 2022-10-06 RX ORDER — PREDNISOLONE SODIUM PHOSPHATE 1 %
1 DROPS OPHTHALMIC (EYE)
Refills: 0 | Status: DISCONTINUED | OUTPATIENT
Start: 2022-10-06 | End: 2022-10-07

## 2022-10-06 RX ORDER — INFLUENZA VIRUS VACCINE 15; 15; 15; 15 UG/.5ML; UG/.5ML; UG/.5ML; UG/.5ML
0.7 SUSPENSION INTRAMUSCULAR ONCE
Refills: 0 | Status: DISCONTINUED | OUTPATIENT
Start: 2022-10-06 | End: 2022-10-07

## 2022-10-06 RX ADMIN — Medication 60 MILLIGRAM(S): at 09:12

## 2022-10-06 RX ADMIN — Medication 1 DROP(S): at 18:55

## 2022-10-06 RX ADMIN — Medication 1 TABLET(S): at 11:55

## 2022-10-06 RX ADMIN — MORPHINE SULFATE 2 MILLIGRAM(S): 50 CAPSULE, EXTENDED RELEASE ORAL at 19:42

## 2022-10-06 RX ADMIN — MORPHINE SULFATE 2 MILLIGRAM(S): 50 CAPSULE, EXTENDED RELEASE ORAL at 10:09

## 2022-10-06 RX ADMIN — Medication 1 DROP(S): at 21:18

## 2022-10-06 RX ADMIN — ATORVASTATIN CALCIUM 10 MILLIGRAM(S): 80 TABLET, FILM COATED ORAL at 21:18

## 2022-10-06 RX ADMIN — MORPHINE SULFATE 2 MILLIGRAM(S): 50 CAPSULE, EXTENDED RELEASE ORAL at 19:27

## 2022-10-06 RX ADMIN — SPIRONOLACTONE 25 MILLIGRAM(S): 25 TABLET, FILM COATED ORAL at 09:12

## 2022-10-06 RX ADMIN — MORPHINE SULFATE 2 MILLIGRAM(S): 50 CAPSULE, EXTENDED RELEASE ORAL at 10:24

## 2022-10-06 RX ADMIN — LACTULOSE 20 GRAM(S): 10 SOLUTION ORAL at 21:18

## 2022-10-06 NOTE — CONSULT NOTE ADULT - SUBJECTIVE AND OBJECTIVE BOX
**** INCOMPLETE NOTE****    Pt Name: JUAN SY    MRN: [id number]      Patient is a 67y Female with alcoholic liver cirrhosis status post TIPS procedure, hepatitis C, hypertension hyperlipidemia inguinal hernia, presenting to the emergency department with a chief complaint back pain x 1 month. Patient states that the pain occurred spontaneously and since the back pain started she has not been able to ambulate independently and has been using a walker. Patient states that she has had one episode of urinary incontinence *****. Patient denies any other bowel or bladder changes. Patient denies any numbness, tingling, fevers, chills, trauma or any other orthopedic complaints.     PAST MEDICAL & SURGICAL HISTORY:  Inguinal hernia  Umbilical hernia  HTN (hypertension)  HLD (hyperlipidemia)  Cirrhosis  Hepatitis C  S/P TIPS (transjugular intrahepatic portosystemic shunt)      Allergies: No Known Allergies      Medications: atorvastatin 10 milliGRAM(s) Oral at bedtime  influenza  Vaccine (HIGH DOSE) 0.7 milliLiter(s) IntraMuscular once  lactulose Syrup 20 Gram(s) Oral at bedtime  morphine  - Injectable 2 milliGRAM(s) IV Push every 4 hours PRN  multivitamin 1 Tablet(s) Oral daily  propranolol LA 60 milliGRAM(s) Oral daily  rifAXIMin 550 milliGRAM(s) Oral two times a day  spironolactone 25 milliGRAM(s) Oral daily      FAMILY HISTORY:  FH: diabetes mellitus (Father)    : non-contributory    Social History:                             12.0   6.39  )-----------( 122      ( 06 Oct 2022 04:27 )             35.6     10-05    145  |  114<H>  |  12  ----------------------------<  95  3.6   |  24  |  0.68    Ca    8.6      05 Oct 2022 14:40    TPro  6.7  /  Alb  1.9<L>  /  TBili  7.2<H>  /  DBili  x   /  AST  72<H>  /  ALT  33  /  AlkPhos  280<H>  10-05      PHYSICAL EXAM:    Vital Signs Last 24 Hrs  T(C): 36.6 (06 Oct 2022 05:14), Max: 37.7 (05 Oct 2022 14:40)  T(F): 97.9 (06 Oct 2022 05:14), Max: 99.8 (05 Oct 2022 14:40)  HR: 75 (06 Oct 2022 05:14) (75 - 90)  BP: 117/69 (06 Oct 2022 05:14) (117/69 - 145/70)  BP(mean): --  RR: 17 (06 Oct 2022 05:14) (16 - 18)  SpO2: 95% (06 Oct 2022 05:14) (95% - 98%)    Parameters below as of 06 Oct 2022 05:14  Patient On (Oxygen Delivery Method): room air      Daily Height in cm: 167.64 (05 Oct 2022 14:11)    Daily     Appearance: Alert, responsive, in no acute distress.  Skin: no rash on visible skin. Skin is clean, dry and intact. No bleeding. No abrasions. No ulcerations.  Vascular: 2+ distal pulses. Cap refill < 2 sec. No signs of venous insuffiencey or stasis. No extremity ulcerations. No cyanosis.    Musculoskeletal:    Pathologic reflexes: [ ] Miller,  [ ]  Clonus           Motor exam: [  ]          [ ] Upper extremity              Bi(c5)  WE(c6)  EE(c7)   FF(c8)                                                R         5/5        5/5        5/5       5/5                                               L          5/5        5/5        5/5       5/5         [ ] Lower extremity          HF(l2)   KE(l3)    TA(l4)   EHL(l5)  GS(s1)                                                 R        5/5        5/5        5/5       5/5         5/5                                               L         5/5        5/5       5/5       5/5          5/5  Sensation L2-S1 SILT  On rectal exam: patient had + passive tone but decreased active tone and decreased perianal sensation.     Imaging Studies: CT Lumbar Spine: Acute L1 fracture with retropulsion     A/P:  Pt is a  67y Female found to have L1 Fracture      PLAN:  * Pain control  * MRI ordered  * Treatment plan to be finalized after review of pending imaging studies  * Above as discussed with Dr. Conner Pt Name: JUAN SY    MRN: [id number]      Patient is a 67y Female with alcoholic liver cirrhosis status post TIPS procedure, hepatitis C, hypertension hyperlipidemia inguinal hernia, presenting to the emergency department with a chief complaint back pain x 1 month. Patient states that the pain occurred spontaneously and since the back pain started she has not been able to ambulate independently and has been using a walker. Patient states that she has had two episodes of urinary incontinence 2-3 days ago. Upon further questioning, patient states, she felt the need to void and got up to go to the bathroom but didn't make it in time. Patient denies any other bowel or bladder changes. Patient denies any numbness, tingling, fevers, chills, trauma or any other orthopedic complaints.     PAST MEDICAL & SURGICAL HISTORY:  Inguinal hernia  Umbilical hernia  HTN (hypertension)  HLD (hyperlipidemia)  Cirrhosis  Hepatitis C  S/P TIPS (transjugular intrahepatic portosystemic shunt)      Allergies: No Known Allergies      Medications: atorvastatin 10 milliGRAM(s) Oral at bedtime  influenza  Vaccine (HIGH DOSE) 0.7 milliLiter(s) IntraMuscular once  lactulose Syrup 20 Gram(s) Oral at bedtime  morphine  - Injectable 2 milliGRAM(s) IV Push every 4 hours PRN  multivitamin 1 Tablet(s) Oral daily  propranolol LA 60 milliGRAM(s) Oral daily  rifAXIMin 550 milliGRAM(s) Oral two times a day  spironolactone 25 milliGRAM(s) Oral daily      FAMILY HISTORY:  FH: diabetes mellitus (Father)    : non-contributory    Social History:                             12.0   6.39  )-----------( 122      ( 06 Oct 2022 04:27 )             35.6     10-05    145  |  114<H>  |  12  ----------------------------<  95  3.6   |  24  |  0.68    Ca    8.6      05 Oct 2022 14:40    TPro  6.7  /  Alb  1.9<L>  /  TBili  7.2<H>  /  DBili  x   /  AST  72<H>  /  ALT  33  /  AlkPhos  280<H>  10-05      PHYSICAL EXAM:    Vital Signs Last 24 Hrs  T(C): 36.6 (06 Oct 2022 05:14), Max: 37.7 (05 Oct 2022 14:40)  T(F): 97.9 (06 Oct 2022 05:14), Max: 99.8 (05 Oct 2022 14:40)  HR: 75 (06 Oct 2022 05:14) (75 - 90)  BP: 117/69 (06 Oct 2022 05:14) (117/69 - 145/70)  BP(mean): --  RR: 17 (06 Oct 2022 05:14) (16 - 18)  SpO2: 95% (06 Oct 2022 05:14) (95% - 98%)    Parameters below as of 06 Oct 2022 05:14  Patient On (Oxygen Delivery Method): room air      Daily Height in cm: 167.64 (05 Oct 2022 14:11)    Daily     Appearance: Alert, responsive, in no acute distress.  Skin: no rash on visible skin. Skin is clean, dry and intact. No bleeding. No abrasions. No ulcerations.  Vascular: 2+ distal pulses. Cap refill < 2 sec. No signs of venous insuffiencey or stasis. No extremity ulcerations. No cyanosis.    Musculoskeletal:    Pathologic reflexes:   - Clonus           Motor exam: [  ]          [ ] Upper extremity              Bi(c5)  WE(c6)  EE(c7)   FF(c8)                                                R         5/5        5/5        5/5       5/5                                               L          5/5        5/5        5/5       5/5         [ ] Lower extremity          HF(l2)   KE(l3)    TA(l4)   EHL(l5)  GS(s1)                                                 R        5/5        5/5        5/5       5/5         5/5                                               L         5/5        5/5       5/5       5/5          5/5  Sensation L2-S1 SILT  On rectal exam: patient had + passive tone but decreased active tone and states she has decreased perianal sensation.     No TTP over lumbar spine, mild tenderness over Lumbo-sacral joint on left side.     Imaging Studies: CT Lumbar Spine: Acute L1 fracture with retropulsion     A/P:  Pt is a  67y Female found to have L1 Fracture acute vs. chronic?       PLAN:  * Pain control  * MRI ordered  * Treatment plan to be finalized after review of pending imaging studies  * Above as discussed with Dr. Conner Pt Name: JUAN SY    MRN: [id number]      Patient is a 67y Female with alcoholic liver cirrhosis status post TIPS procedure, hepatitis C, hypertension hyperlipidemia inguinal hernia, presenting to the emergency department with a chief complaint back pain x 1 month. Patient states that the pain occurred spontaneously and since the back pain started she has not been able to ambulate independently and has been using a walker. Patient states that she has had two episodes of urinary incontinence 2-3 days ago. Upon further questioning, patient states, she felt the need to void and got up to go to the bathroom but didn't make it in time. Patient denies any other bowel or bladder changes. Patient denies any numbness, tingling, fevers, chills, trauma or any other orthopedic complaints. Patient states she no longer drinks and has not had a drink in 15 years. Patient admits to tobacco/cigarette use. She states she has a cigarette every other day and more often when she is stressed.   Of note: patient was a poor historian, some questions needed to be repeated about 3-4 times.     PAST MEDICAL & SURGICAL HISTORY:  Inguinal hernia  Umbilical hernia  HTN (hypertension)  HLD (hyperlipidemia)  Cirrhosis  Hepatitis C  S/P TIPS (transjugular intrahepatic portosystemic shunt)      Allergies: No Known Allergies      Medications: atorvastatin 10 milliGRAM(s) Oral at bedtime  influenza  Vaccine (HIGH DOSE) 0.7 milliLiter(s) IntraMuscular once  lactulose Syrup 20 Gram(s) Oral at bedtime  morphine  - Injectable 2 milliGRAM(s) IV Push every 4 hours PRN  multivitamin 1 Tablet(s) Oral daily  propranolol LA 60 milliGRAM(s) Oral daily  rifAXIMin 550 milliGRAM(s) Oral two times a day  spironolactone 25 milliGRAM(s) Oral daily      FAMILY HISTORY:  FH: diabetes mellitus (Father)    : non-contributory    Social History:                             12.0   6.39  )-----------( 122      ( 06 Oct 2022 04:27 )             35.6     10-05    145  |  114<H>  |  12  ----------------------------<  95  3.6   |  24  |  0.68    Ca    8.6      05 Oct 2022 14:40    TPro  6.7  /  Alb  1.9<L>  /  TBili  7.2<H>  /  DBili  x   /  AST  72<H>  /  ALT  33  /  AlkPhos  280<H>  10-05      PHYSICAL EXAM:    Vital Signs Last 24 Hrs  T(C): 36.6 (06 Oct 2022 05:14), Max: 37.7 (05 Oct 2022 14:40)  T(F): 97.9 (06 Oct 2022 05:14), Max: 99.8 (05 Oct 2022 14:40)  HR: 75 (06 Oct 2022 05:14) (75 - 90)  BP: 117/69 (06 Oct 2022 05:14) (117/69 - 145/70)  BP(mean): --  RR: 17 (06 Oct 2022 05:14) (16 - 18)  SpO2: 95% (06 Oct 2022 05:14) (95% - 98%)    Parameters below as of 06 Oct 2022 05:14  Patient On (Oxygen Delivery Method): room air      Daily Height in cm: 167.64 (05 Oct 2022 14:11)    Daily     Appearance: Alert, responsive, in no acute distress.  Skin: no rash on visible skin. Skin is clean, dry and intact. No bleeding. No abrasions. No ulcerations.  Vascular: 2+ distal pulses. Cap refill < 2 sec. No signs of venous insuffiencey or stasis. No extremity ulcerations. No cyanosis.    Musculoskeletal:    Pathologic reflexes:   - Clonus           Motor exam: [  ]          [ ] Upper extremity              Bi(c5)  WE(c6)  EE(c7)   FF(c8)                                                R         5/5        5/5        5/5       5/5                                               L          5/5        5/5        5/5       5/5         [ ] Lower extremity          HF(l2)   KE(l3)    TA(l4)   EHL(l5)  GS(s1)                                                 R        5/5        5/5        5/5       5/5         5/5                                               L         5/5        5/5       5/5       5/5          5/5  Sensation L2-S1 SILT  On rectal exam: patient had + passive tone but decreased active tone and states she has decreased perianal sensation.     No TTP over lumbar spine, mild tenderness over Lumbo-sacral joint on left side.     Imaging Studies: CT Lumbar Spine: Acute L1 fracture with retropulsion     A/P:  Pt is a  67y Female found to have L1 Fracture acute vs. chronic?       PLAN:  * Pain control  * MRI ordered  * Treatment plan to be finalized after review of pending imaging studies  * Above as discussed with Dr. Conner Pt Name: JUAN SY    MRN: [id number]      Patient is a 67y Female with alcoholic liver cirrhosis status post TIPS procedure, hepatitis C, hypertension hyperlipidemia inguinal hernia, presenting to the emergency department with a chief complaint back pain x 1 month. Patient states that the pain occurred spontaneously and since the back pain started she has not been able to ambulate independently and has been using a walker. Patient states that she has had two episodes of urinary incontinence 2-3 days ago. Upon further questioning, patient states, she felt the need to void and got up to go to the bathroom but didn't make it in time. Patient denies any other bowel or bladder changes. Patient denies any numbness, tingling, fevers, chills, trauma or any other orthopedic complaints. Patient states she no longer drinks and has not had a drink in 15 years. Patient admits to tobacco/cigarette use. She states she has a cigarette every other day and more often when she is stressed.   Of note: patient was a poor historian, some questions needed to be repeated about 3-4 times.     PAST MEDICAL & SURGICAL HISTORY:  Inguinal hernia  Umbilical hernia  HTN (hypertension)  HLD (hyperlipidemia)  Cirrhosis  Hepatitis C  S/P TIPS (transjugular intrahepatic portosystemic shunt)      Allergies: No Known Allergies      Medications: atorvastatin 10 milliGRAM(s) Oral at bedtime  influenza  Vaccine (HIGH DOSE) 0.7 milliLiter(s) IntraMuscular once  lactulose Syrup 20 Gram(s) Oral at bedtime  morphine  - Injectable 2 milliGRAM(s) IV Push every 4 hours PRN  multivitamin 1 Tablet(s) Oral daily  propranolol LA 60 milliGRAM(s) Oral daily  rifAXIMin 550 milliGRAM(s) Oral two times a day  spironolactone 25 milliGRAM(s) Oral daily      FAMILY HISTORY:  FH: diabetes mellitus (Father)    : non-contributory    Social History:                             12.0   6.39  )-----------( 122      ( 06 Oct 2022 04:27 )             35.6     10-05    145  |  114<H>  |  12  ----------------------------<  95  3.6   |  24  |  0.68    Ca    8.6      05 Oct 2022 14:40    TPro  6.7  /  Alb  1.9<L>  /  TBili  7.2<H>  /  DBili  x   /  AST  72<H>  /  ALT  33  /  AlkPhos  280<H>  10-05      PHYSICAL EXAM:    Vital Signs Last 24 Hrs  T(C): 36.6 (06 Oct 2022 05:14), Max: 37.7 (05 Oct 2022 14:40)  T(F): 97.9 (06 Oct 2022 05:14), Max: 99.8 (05 Oct 2022 14:40)  HR: 75 (06 Oct 2022 05:14) (75 - 90)  BP: 117/69 (06 Oct 2022 05:14) (117/69 - 145/70)  BP(mean): --  RR: 17 (06 Oct 2022 05:14) (16 - 18)  SpO2: 95% (06 Oct 2022 05:14) (95% - 98%)    Parameters below as of 06 Oct 2022 05:14  Patient On (Oxygen Delivery Method): room air      Daily Height in cm: 167.64 (05 Oct 2022 14:11)    Daily     Appearance: Alert, responsive, in no acute distress.  Skin: no rash on visible skin. Skin is clean, dry and intact. No bleeding. No abrasions. No ulcerations.  Vascular: 2+ distal pulses. Cap refill < 2 sec. No signs of venous insuffiencey or stasis. No extremity ulcerations. No cyanosis.    Musculoskeletal:    Pathologic reflexes:   - Clonus           Motor exam: [  ]          [ ] Upper extremity              Bi(c5)  WE(c6)  EE(c7)   FF(c8)                                                R         5/5        5/5        5/5       5/5                                               L          5/5        5/5        5/5       5/5         [ ] Lower extremity          HF(l2)   KE(l3)    TA(l4)   EHL(l5)  GS(s1)                                                 R        5/5        5/5        5/5       5/5         5/5                                               L         5/5        5/5       5/5       5/5          5/5  Sensation L2-S1 SILT  On rectal exam: patient had + passive tone but decreased active tone and states she has decreased perianal sensation.     No TTP over lumbar spine, mild tenderness over Lumbo-sacral joint on left side.     Imaging Studies: CT Lumbar Spine: Acute L1 fracture with retropulsion     A/P:  Pt is a  67y Female found to have L1 Fracture acute vs. chronic?       PLAN:  * Pain control  * MRI ordered  * Treatment plan to be finalized after review of pending imaging studies  * Above as discussed with Dr. Conner      **Addendum**  MRI Lumbar: Showing L1 Burst Fx, Broad retropulsed Fragment w/ Mild Central Canal Narrowing, moderate degenerative changes.   - order TLSO brace for stability and rotation prevention   - follow up with Dr. Conner in office, call to make an appointment.

## 2022-10-06 NOTE — PATIENT PROFILE ADULT - FALL HARM RISK - HARM RISK INTERVENTIONS

## 2022-10-06 NOTE — CHART NOTE - NSCHARTNOTEFT_GEN_A_CORE
Order received from ortho pa to fit patient with TLSO for pain control and fracture healing.  t measured at bedside and orthosis adjusted to patient dimensions.  Pt instructed to don and doff  Written instruictions left at bedside.  Follow up if needed    Ronaldo Irvin CO  [phone]

## 2022-10-06 NOTE — CONSULT NOTE ADULT - ASSESSMENT
h/o cirrhosis  s/p TIPS      no signs of encephalopathy  cont lactulose/xifaxin  monitor mental status while on opiods  no signs of ascites on imaging  low sodium diet  will follow

## 2022-10-06 NOTE — H&P ADULT - HISTORY OF PRESENT ILLNESS
67-year-old female with past medical history alcoholic liver cirrhosis status post TIPS procedure, hepatitis C, hypertension hyperlipidemia inguinal hernia presents to the ER with complaints of 2 months of atraumatic left lower back pain.  This is left the patient bedbound.   states patient is more depressed than usual not getting out of bed is concerned that she is going to develop pneumonia so he brought her to the ER.  Patient denies fever chills nausea vomiting diarrhea.  Patient has been taking Motrin for the left-sided back pain with no improvement.  Denies numbness and tingling to the legs denies weakness.  Denies urinary and bladder incontinence.  No saddle anesthesia.  concerned she may have been taking too many medications or missing medication doses.

## 2022-10-06 NOTE — CONSULT NOTE ADULT - SUBJECTIVE AND OBJECTIVE BOX
[facility]  Montez Gordon PA-C  [address] [place] [id number]  [phone]      Chief Complaint:  Patient is a 67y old  Female who presents with a chief complaint of back pain (06 Oct 2022 11:09)      HPI:   67-year-old female with past medical history alcoholic liver cirrhosis status post TIPS procedure, hepatitis C, hypertension hyperlipidemia inguinal hernia presents to the ER with complaints of 2 months of atraumatic left lower back pain.  This is left the patient bedbound.   states patient is more depressed than usual not getting out of bed is concerned that she is going to develop pneumonia so he brought her to the ER.  Patient denies fever chills nausea vomiting diarrhea.  Patient has been taking Motrin for the left-sided back pain with no improvement.  Denies numbness and tingling to the legs denies weakness.  Denies urinary and bladder incontinence.  No saddle anesthesia.  concerned she may have been taking too many medications or missing medication doses.     Allergies:  No Known Allergies      Medications:  atorvastatin 10 milliGRAM(s) Oral at bedtime  influenza  Vaccine (HIGH DOSE) 0.7 milliLiter(s) IntraMuscular once  lactulose Syrup 20 Gram(s) Oral at bedtime  morphine  - Injectable 2 milliGRAM(s) IV Push every 4 hours PRN  multivitamin 1 Tablet(s) Oral daily  propranolol LA 60 milliGRAM(s) Oral daily  rifAXIMin 550 milliGRAM(s) Oral two times a day  spironolactone 25 milliGRAM(s) Oral daily      PMHX/PSHX:  Inguinal hernia    Umbilical hernia    HTN (hypertension)    HLD (hyperlipidemia)    Cirrhosis    Hepatitis C    No significant past surgical history    S/P TIPS (transjugular intrahepatic portosystemic shunt)        Family history:  FH: diabetes mellitus (Father)        Social History:     ROS:     General:  No wt loss, fevers, chills, night sweats, fatigue,   Eyes:  Good vision, no reported pain  ENT:  No sore throat, pain, runny nose, dysphagia  CV:  No pain, palpitations, hypo/hypertension  Resp:  No dyspnea, cough, tachypnea, wheezing  GI:  No pain, No nausea, No vomiting, No diarrhea, No constipation, No weight loss, No fever, No pruritis, No rectal bleeding, No tarry stools, No dysphagia,  :  No pain, bleeding, incontinence, nocturia  Muscle:  No pain, weakness  Neuro:  No weakness, tingling, memory problems  Psych:  No fatigue, insomnia, mood problems, depression  Endocrine:  No polyuria, polydipsia, cold/heat intolerance  Heme:  No petechiae, ecchymosis, easy bruisability  Skin:  No rash, tattoos, scars, edema      PHYSICAL EXAM:   Vital Signs:  Vital Signs Last 24 Hrs  T(C): 36.6 (06 Oct 2022 12:51), Max: 37.7 (05 Oct 2022 14:40)  T(F): 97.9 (06 Oct 2022 12:51), Max: 99.8 (05 Oct 2022 14:40)  HR: 64 (06 Oct 2022 12:51) (64 - 94)  BP: 122/61 (06 Oct 2022 12:51) (117/69 - 136/74)  BP(mean): --  RR: 17 (06 Oct 2022 12:51) (17 - 18)  SpO2: 95% (06 Oct 2022 12:51) (95% - 97%)    Parameters below as of 06 Oct 2022 12:51  Patient On (Oxygen Delivery Method): room air      Daily     Daily     GENERAL:  Appears stated age,   HEENT:  NC/AT,    CHEST:  Full & symmetric excursion,   HEART:  Regular rhythm  ABDOMEN:  Soft, non-tender, non-distended,   EXTEREMITIES:  no cyanosis,clubbing or edema  SKIN:  No rash  NEURO:  Alert,    LABS:                        12.0   6.39  )-----------( 122      ( 06 Oct 2022 04:27 )             35.6     10-06    145  |  114<H>  |  11  ----------------------------<  98  3.6   |  25  |  0.60    Ca    8.5      06 Oct 2022 08:00    TPro  6.7  /  Alb  1.9<L>  /  TBili  7.2<H>  /  DBili  x   /  AST  72<H>  /  ALT  33  /  AlkPhos  280<H>  10-05    LIVER FUNCTIONS - ( 05 Oct 2022 14:40 )  Alb: 1.9 g/dL / Pro: 6.7 g/dL / ALK PHOS: 280 U/L / ALT: 33 U/L / AST: 72 U/L / GGT: x           PT/INR - ( 05 Oct 2022 14:40 )   PT: 21.4 sec;   INR: 1.82 ratio         PTT - ( 05 Oct 2022 14:40 )  PTT:36.7 sec  Urinalysis Basic - ( 06 Oct 2022 06:25 )    Color: Miladys / Appearance: Clear / S.015 / pH: x  Gluc: x / Ketone: Small  / Bili: Moderate / Urobili: 12   Blood: x / Protein: 30 mg/dL / Nitrite: Negative   Leuk Esterase: Trace / RBC: 6-10 /HPF / WBC 6-10   Sq Epi: x / Non Sq Epi: Few / Bacteria: Few      Amylase Serum--      Lipase serum--       Ammonia<17      Imaging:

## 2022-10-06 NOTE — H&P ADULT - PROBLEM SELECTOR PLAN 1
ct lumbar spine noted  pt with compression fracture and degenerative disc disease  ortho eval noted  mri spine  pain control  pt eval  no neurological compromise noted  further management pending imaging and ortho fu

## 2022-10-07 ENCOUNTER — APPOINTMENT (OUTPATIENT)
Dept: ORTHOPEDIC SURGERY | Facility: CLINIC | Age: 67
End: 2022-10-07

## 2022-10-07 ENCOUNTER — TRANSCRIPTION ENCOUNTER (OUTPATIENT)
Age: 67
End: 2022-10-07

## 2022-10-07 VITALS
TEMPERATURE: 98 F | HEART RATE: 53 BPM | OXYGEN SATURATION: 92 % | DIASTOLIC BLOOD PRESSURE: 68 MMHG | SYSTOLIC BLOOD PRESSURE: 111 MMHG | RESPIRATION RATE: 17 BRPM

## 2022-10-07 LAB
ALBUMIN SERPL ELPH-MCNC: 1.7 G/DL — LOW (ref 3.3–5)
ALP SERPL-CCNC: 280 U/L — HIGH (ref 40–120)
ALT FLD-CCNC: 29 U/L — SIGNIFICANT CHANGE UP (ref 12–78)
ANION GAP SERPL CALC-SCNC: 8 MMOL/L — SIGNIFICANT CHANGE UP (ref 5–17)
AST SERPL-CCNC: 67 U/L — HIGH (ref 15–37)
BILIRUB SERPL-MCNC: 5.7 MG/DL — HIGH (ref 0.2–1.2)
BUN SERPL-MCNC: 10 MG/DL — SIGNIFICANT CHANGE UP (ref 7–23)
CALCIUM SERPL-MCNC: 8.1 MG/DL — LOW (ref 8.5–10.1)
CHLORIDE SERPL-SCNC: 114 MMOL/L — HIGH (ref 96–108)
CO2 SERPL-SCNC: 22 MMOL/L — SIGNIFICANT CHANGE UP (ref 22–31)
CREAT SERPL-MCNC: 0.44 MG/DL — LOW (ref 0.5–1.3)
CULTURE RESULTS: SIGNIFICANT CHANGE UP
EGFR: 107 ML/MIN/1.73M2 — SIGNIFICANT CHANGE UP
GLUCOSE SERPL-MCNC: 94 MG/DL — SIGNIFICANT CHANGE UP (ref 70–99)
HCT VFR BLD CALC: 31.6 % — LOW (ref 34.5–45)
HGB BLD-MCNC: 10.7 G/DL — LOW (ref 11.5–15.5)
MCHC RBC-ENTMCNC: 33.9 GM/DL — SIGNIFICANT CHANGE UP (ref 32–36)
MCHC RBC-ENTMCNC: 37.5 PG — HIGH (ref 27–34)
MCV RBC AUTO: 110.9 FL — HIGH (ref 80–100)
NRBC # BLD: 0 /100 WBCS — SIGNIFICANT CHANGE UP (ref 0–0)
PLATELET # BLD AUTO: 110 K/UL — LOW (ref 150–400)
POTASSIUM SERPL-MCNC: 3.6 MMOL/L — SIGNIFICANT CHANGE UP (ref 3.5–5.3)
POTASSIUM SERPL-SCNC: 3.6 MMOL/L — SIGNIFICANT CHANGE UP (ref 3.5–5.3)
PROT SERPL-MCNC: 6 G/DL — SIGNIFICANT CHANGE UP (ref 6–8.3)
RBC # BLD: 2.85 M/UL — LOW (ref 3.8–5.2)
RBC # FLD: 16.2 % — HIGH (ref 10.3–14.5)
SODIUM SERPL-SCNC: 144 MMOL/L — SIGNIFICANT CHANGE UP (ref 135–145)
SPECIMEN SOURCE: SIGNIFICANT CHANGE UP
WBC # BLD: 5.35 K/UL — SIGNIFICANT CHANGE UP (ref 3.8–10.5)
WBC # FLD AUTO: 5.35 K/UL — SIGNIFICANT CHANGE UP (ref 3.8–10.5)

## 2022-10-07 PROCEDURE — 93005 ELECTROCARDIOGRAM TRACING: CPT

## 2022-10-07 PROCEDURE — 81001 URINALYSIS AUTO W/SCOPE: CPT

## 2022-10-07 PROCEDURE — 85027 COMPLETE CBC AUTOMATED: CPT

## 2022-10-07 PROCEDURE — 83605 ASSAY OF LACTIC ACID: CPT

## 2022-10-07 PROCEDURE — 96360 HYDRATION IV INFUSION INIT: CPT

## 2022-10-07 PROCEDURE — 87040 BLOOD CULTURE FOR BACTERIA: CPT

## 2022-10-07 PROCEDURE — 70450 CT HEAD/BRAIN W/O DYE: CPT | Mod: MA

## 2022-10-07 PROCEDURE — 74177 CT ABD & PELVIS W/CONTRAST: CPT | Mod: MA

## 2022-10-07 PROCEDURE — 71260 CT THORAX DX C+: CPT | Mod: MA

## 2022-10-07 PROCEDURE — 0225U NFCT DS DNA&RNA 21 SARSCOV2: CPT

## 2022-10-07 PROCEDURE — 85610 PROTHROMBIN TIME: CPT

## 2022-10-07 PROCEDURE — 72131 CT LUMBAR SPINE W/O DYE: CPT | Mod: MA

## 2022-10-07 PROCEDURE — 72148 MRI LUMBAR SPINE W/O DYE: CPT | Mod: MA

## 2022-10-07 PROCEDURE — 80048 BASIC METABOLIC PNL TOTAL CA: CPT

## 2022-10-07 PROCEDURE — 85025 COMPLETE CBC W/AUTO DIFF WBC: CPT

## 2022-10-07 PROCEDURE — 82962 GLUCOSE BLOOD TEST: CPT

## 2022-10-07 PROCEDURE — 82140 ASSAY OF AMMONIA: CPT

## 2022-10-07 PROCEDURE — 87086 URINE CULTURE/COLONY COUNT: CPT

## 2022-10-07 PROCEDURE — 80053 COMPREHEN METABOLIC PANEL: CPT

## 2022-10-07 PROCEDURE — 85730 THROMBOPLASTIN TIME PARTIAL: CPT

## 2022-10-07 PROCEDURE — 97161 PT EVAL LOW COMPLEX 20 MIN: CPT

## 2022-10-07 PROCEDURE — 99285 EMERGENCY DEPT VISIT HI MDM: CPT | Mod: 25

## 2022-10-07 PROCEDURE — 36415 COLL VENOUS BLD VENIPUNCTURE: CPT

## 2022-10-07 RX ORDER — TRAMADOL HYDROCHLORIDE 50 MG/1
1 TABLET ORAL
Qty: 20 | Refills: 0
Start: 2022-10-07

## 2022-10-07 RX ORDER — MOXIFLOXACIN HCL 0.5 %
1 DROPS OPHTHALMIC (EYE)
Qty: 0 | Refills: 0 | DISCHARGE
Start: 2022-10-07

## 2022-10-07 RX ORDER — PREDNISOLONE SODIUM PHOSPHATE 1 %
1 DROPS OPHTHALMIC (EYE)
Qty: 0 | Refills: 0 | DISCHARGE
Start: 2022-10-07

## 2022-10-07 RX ORDER — KETOROLAC TROMETHAMINE 0.5 %
1 DROPS OPHTHALMIC (EYE)
Qty: 0 | Refills: 0 | DISCHARGE
Start: 2022-10-07

## 2022-10-07 RX ADMIN — Medication 1 DROP(S): at 11:28

## 2022-10-07 RX ADMIN — Medication 1 DROP(S): at 05:51

## 2022-10-07 RX ADMIN — MORPHINE SULFATE 2 MILLIGRAM(S): 50 CAPSULE, EXTENDED RELEASE ORAL at 03:01

## 2022-10-07 RX ADMIN — Medication 60 MILLIGRAM(S): at 05:50

## 2022-10-07 RX ADMIN — Medication 1 TABLET(S): at 11:28

## 2022-10-07 RX ADMIN — Medication 1 DROP(S): at 00:08

## 2022-10-07 RX ADMIN — Medication 1 DROP(S): at 05:52

## 2022-10-07 RX ADMIN — Medication 1 DROP(S): at 13:59

## 2022-10-07 RX ADMIN — SPIRONOLACTONE 25 MILLIGRAM(S): 25 TABLET, FILM COATED ORAL at 05:50

## 2022-10-07 RX ADMIN — MORPHINE SULFATE 2 MILLIGRAM(S): 50 CAPSULE, EXTENDED RELEASE ORAL at 02:46

## 2022-10-07 RX ADMIN — Medication 1 DROP(S): at 11:29

## 2022-10-07 RX ADMIN — Medication 1 DROP(S): at 00:09

## 2022-10-07 NOTE — PHYSICAL THERAPY INITIAL EVALUATION ADULT - LIVES WITH, PROFILE
; Multiple stairs throughout the house with handrails - patient unable to quantify number of stairs.

## 2022-10-07 NOTE — PROGRESS NOTE ADULT - PROBLEM SELECTOR PLAN 1
ct lumbar spine noted  MRI noted -- received LSO brace  pt with compression fracture and degenerative disc disease  ortho eval noted  pain control  pt eval  no neurological compromise noted  d/c home with home PT

## 2022-10-07 NOTE — DISCHARGE NOTE PROVIDER - PROVIDER TOKENS
PROVIDER:[TOKEN:[[id number]],FOLLOWUP:[1 week],ESTABLISHEDPATIENT:[T]],PROVIDER:[TOKEN:[[id number]],FOLLOWUP:[1 week]]

## 2022-10-07 NOTE — DISCHARGE NOTE NURSING/CASE MANAGEMENT/SOCIAL WORK - PATIENT PORTAL LINK FT
You can access the FollowMyHealth Patient Portal offered by [facility] by registering at the following website: [email or web address]. By joining [employer]’s FollowMyHealth portal, you will also be able to view your health information using other applications (apps) compatible with our system.

## 2022-10-07 NOTE — PHYSICAL THERAPY INITIAL EVALUATION ADULT - PLANNED THERAPY INTERVENTIONS, PT EVAL
2 Sessions: Stair Negotiation - Ascend/descend 12 steps with bilateral handrails independently/bed mobility training/gait training/transfer training

## 2022-10-07 NOTE — PROGRESS NOTE ADULT - ASSESSMENT
h/o cirrhosis  s/p TIPS    no signs of encephalopathy  cont lactulose/xifaxan  monitor mental status while on opioids  no signs of ascites on imaging  low sodium diet  will follow     I reviewed the overnight course of events on the unit, re-confirming the patient history. I discussed the care with the patient and their family  Differential diagnosis and plan of care discussed with patient after the evaluation  35 minutes spent on total encounter of which more than fifty percent of the encounter was spent counseling and/or coordinating care by the attending physician.  Advanced care planning was discussed with patient and family.  Advanced care planning forms were reviewed and discussed.  Risks, benefits and alternatives of gastroenterologic procedures were discussed in detail and all questions were answered.

## 2022-10-07 NOTE — PHYSICAL THERAPY INITIAL EVALUATION ADULT - PERTINENT HX OF CURRENT PROBLEM, REHAB EVAL
As per H&P, patient with past medical history of alcoholic liver cirrhosis status post TIPS procedure, hepatitis C, hypertension hyperlipidemia inguinal hernia presents to the ER with complaints of 2 months of atraumatic left lower back pain.    10/6 Lumbar Spine MRI: Moderate acute to subacute incomplete burst fracture inferior endplate of   L1. Broad retropulsed fragment with mild spinal canal narrowing anterior   to posterior dimension of the thecal sac measures 1.2 cm. No compression   of the cauda equina. No significant epidural hematoma.  Mild to moderate S-shaped scoliosis with moderate multilevel degenerative   changes

## 2022-10-07 NOTE — DISCHARGE NOTE PROVIDER - CARE PROVIDER_API CALL
ARMANDO WALL  Internal Medicine  [address] [place] [address]  Phone: [phone]  Fax: [phone]  Established Patient  Follow Up Time: 1 week    Ozzy Conner)  Orthopaedic Surgery  [address] [place] [address]  Phone: [phone]  Fax: [phone]  Follow Up Time: 1 week

## 2022-10-07 NOTE — DISCHARGE NOTE PROVIDER - NSDCMRMEDTOKEN_GEN_ALL_CORE_FT
freetext medication     -: Inveltys 1 drop(s) to each L eye 3 times a day  ketorolac 0.5% ophthalmic solution: 1 drop(s) to each affected eye 4 times a day  lactulose 10 g/15 mL oral syrup: 30 milliliter(s) orally once a day (at bedtime)  moxifloxacin 0.5% ophthalmic solution: 1 drop(s) to each affected eye 3 times a day  multivitamin: 1 tab(s) orally once a day  pravastatin 40 mg oral tablet: 1 tab(s) orally once a day  prednisoLONE acetate 1% ophthalmic suspension: 1 drop(s) to each affected eye 4 times a day  propranolol 60 mg oral capsule, extended release: 1 cap(s) orally once a day  spironolactone 25 mg oral tablet: 1 tab(s) orally once a day  traMADol 50 mg oral tablet: 1 tab(s) orally every 6 hours, As Needed -for severe pain MDD:4  Xifaxan 550 mg oral tablet: 1 tab(s) orally 2 times a day

## 2022-10-07 NOTE — DISCHARGE NOTE NURSING/CASE MANAGEMENT/SOCIAL WORK - NSDCPEFALRISK_GEN_ALL_CORE
For information on Fall & Injury Prevention, visit: [email or web address] OR  [email or web address] OR  https://www.cdc.gov/steadi/patient.html

## 2022-10-07 NOTE — PROGRESS NOTE ADULT - SUBJECTIVE AND OBJECTIVE BOX
[facility]  Montez Gordon PA-C  [address] [place] [address]  [phone]      INTERVAL HPI/OVERNIGHT EVENTS:  Pt s/e  Tolerating diet  Pt reports no N/V/D or further GI complaints    MEDICATIONS  (STANDING):  atorvastatin 10 milliGRAM(s) Oral at bedtime  influenza  Vaccine (HIGH DOSE) 0.7 milliLiter(s) IntraMuscular once  Inveltys (loteprednol) 1% 1 Drop(s) 1 Drop(s) Left EYE three times a day  ketorolac 0.5% Ophthalmic Solution 1 Drop(s) Left EYE four times a day  lactulose Syrup 20 Gram(s) Oral at bedtime  moxifloxacin 0.5% Solution 1 Drop(s) Left EYE three times a day  multivitamin 1 Tablet(s) Oral daily  prednisoLONE acetate 1% Suspension 1 Drop(s) Left EYE four times a day  propranolol LA 60 milliGRAM(s) Oral daily  rifAXIMin 550 milliGRAM(s) Oral two times a day  spironolactone 25 milliGRAM(s) Oral daily    MEDICATIONS  (PRN):  morphine  - Injectable 2 milliGRAM(s) IV Push every 4 hours PRN Severe Pain (7 - 10)      Allergies    No Known Allergies      PHYSICAL EXAM:   Vital Signs:  Vital Signs Last 24 Hrs  T(C): 36.7 (07 Oct 2022 05:14), Max: 36.7 (07 Oct 2022 05:14)  T(F): 98 (07 Oct 2022 05:14), Max: 98 (07 Oct 2022 05:14)  HR: 60 (07 Oct 2022 05:14) (59 - 64)  BP: 119/67 (07 Oct 2022 05:14) (119/67 - 123/73)  BP(mean): --  RR: 17 (07 Oct 2022 05:14) (16 - 17)  SpO2: 92% (07 Oct 2022 05:14) (92% - 96%)    Parameters below as of 07 Oct 2022 05:14  Patient On (Oxygen Delivery Method): room air    GENERAL:  Appears stated age  ABDOMEN:  Soft, non-tender, non-distended  NEURO:  Alert      LABS:                        10.7   5.35  )-----------( 110      ( 07 Oct 2022 06:55 )             31.6     10-07    144  |  114<H>  |  10  ----------------------------<  94  3.6   |  22  |  0.44<L>    Ca    8.1<L>      07 Oct 2022 06:55    TPro  6.0  /  Alb  1.7<L>  /  TBili  5.7<H>  /  DBili  x   /  AST  67<H>  /  ALT  29  /  AlkPhos  280<H>  10    PT/INR - ( 05 Oct 2022 14:40 )   PT: 21.4 sec;   INR: 1.82 ratio         PTT - ( 05 Oct 2022 14:40 )  PTT:36.7 sec  Urinalysis Basic - ( 06 Oct 2022 06:25 )    Color: Miladys / Appearance: Clear / S.015 / pH: x  Gluc: x / Ketone: Small  / Bili: Moderate / Urobili: 12   Blood: x / Protein: 30 mg/dL / Nitrite: Negative   Leuk Esterase: Trace / RBC: 6-10 /HPF / WBC 6-10   Sq Epi: x / Non Sq Epi: Few / Bacteria: Few  
Patient is a 67y old  Female who presents with a chief complaint of back pain (07 Oct 2022 11:27)      INTERVAL HPI/OVERNIGHT EVENTS: Patient seen and examined. NAD. No complaints.    Vital Signs Last 24 Hrs  T(C): 36.4 (07 Oct 2022 12:36), Max: 36.7 (07 Oct 2022 05:14)  T(F): 97.5 (07 Oct 2022 12:36), Max: 98 (07 Oct 2022 05:14)  HR: 53 (07 Oct 2022 12:36) (53 - 60)  BP: 111/68 (07 Oct 2022 12:36) (111/68 - 123/73)  BP(mean): --  RR: 17 (07 Oct 2022 12:36) (16 - 17)  SpO2: 92% (07 Oct 2022 12:36) (92% - 96%)    Parameters below as of 07 Oct 2022 12:36  Patient On (Oxygen Delivery Method): room air        10-07    144  |  114<H>  |  10  ----------------------------<  94  3.6   |  22  |  0.44<L>    Ca    8.1<L>      07 Oct 2022 06:55    TPro  6.0  /  Alb  1.7<L>  /  TBili  5.7<H>  /  DBili  x   /  AST  67<H>  /  ALT  29  /  AlkPhos  280<H>  10                          10.7   5.35  )-----------( 110      ( 07 Oct 2022 06:55 )             31.6     PT/INR - ( 05 Oct 2022 14:40 )   PT: 21.4 sec;   INR: 1.82 ratio         PTT - ( 05 Oct 2022 14:40 )  PTT:36.7 sec  CAPILLARY BLOOD GLUCOSE        Urinalysis Basic - ( 06 Oct 2022 06:25 )    Color: Miladys / Appearance: Clear / S.015 / pH: x  Gluc: x / Ketone: Small  / Bili: Moderate / Urobili: 12   Blood: x / Protein: 30 mg/dL / Nitrite: Negative   Leuk Esterase: Trace / RBC: 6-10 /HPF / WBC 6-10   Sq Epi: x / Non Sq Epi: Few / Bacteria: Few      < from: MR Lumbar Spine No Cont (10.06.22 @ 11:09) >    ACC: [id number] EXAM:  MR SPINE LUMBAR                          PROCEDURE DATE:  10/06/2022          INTERPRETATION:  MR LUMBAR SPINE    Clinical information: VCF    VCF    A noncontrast MRI of the lumbar spine was performed.    TECHNIQUE:  In the sagittal plane T1, T2, and STIR imaging was performed.  In the   axial plane T1 and T2 weighted imaging was utilized.    COMPARISON:  Exam is compared to recent CT of 10/5/2022    FINDINGS:  SPINAL COLUMN:  Moderate acute to subacute incomplete burst fracture inferior aspect of   the L1 vertebra. Broad retropulsed fragment with mild spinal canal   narrowing. Anterior to posterior dimension of the thecal sac measures 1.2   cm. No compression of the cauda equina.  Mild to moderate S-shaped scoliosis with the upper dextroscoliosis   centered at T12 and the lower lumbar scoliosis centered at L3.  Mild retrolisthesis L2 on L3 and L3 on L4. Mild anterolisthesis L4 and   L5. These are thought related to degenerative changes.  Narrowing of the disc spaces throughout. Associated endplate degenerative   changes/osteophytes. Vacuum disc phenomenon at T11-12, L1-2, L2-3, L3-4,   L4-5 and L5-S1.    DISC LEVELS:  T10-11: Mild disc bulge with mild facet degenerative changes. Mild canal   and severe left neural foramina narrowing.  T11-12: Mild disc bulge with mild facet degenerative changes. Minimal   canal narrowing. Moderate left neural foramina narrowing  T12-L1: Mild disc bulge with mild facet degenerative changes. Minimal   canal and neural foramina narrowing  L1-2: Broad retropulsed bony fragment with facet and ligamentous   degenerative changes. Mild canal, severe left and moderate right neural   foramina narrowing.  L2-3: Mild disc bulge. Moderate right and mild left facet DJD. Mild canal   and right greater than left lateral recess narrowing. Severe right and   moderate left neural foramina narrowing.  L3-4: Right paracentral/foraminal/extraforaminal disc bulge/osteophyte.   Mild facet and ligamentous degenerative changes. Moderate right lateral   recess, moderate to severe right and mild left neural foramina narrowing.  L4-5: Mild disc bulge/osteophyte. Severe right greater than left facet   DJD. Mild canal and right greater than left lateral recess narrowing.   Severe right and mild to moderate left neural foramina narrowing  L5-S1: Diffuse disc bulge with a left foraminal osteophyte. Severe left   greater than right facet and ligamentous degenerative changes. Mild left   greater than right lateral recess narrowing. Severe left and mild right   neural foramina narrowing    CANAL CONTENTS:  No compression of the distal cord or cauda equina.  No epidural hematoma.    IMPRESSION:  Moderate acute to subacute incomplete burst fracture inferior endplate of   L1. Broad retropulsed fragment with mild spinal canal narrowing anterior   to posterior dimension of the thecal sac measures 1.2 cm. No compression   of the cauda equina. No significant epidural hematoma.  Mild to moderate S-shaped scoliosis with moderate multilevel degenerative   changes.    --- End of Report ---            JERRY HURD MD; Attending Radiologist  This document has been electronically signed. Oct  6 2022 12:12PM    < end of copied text >          atorvastatin 10 milliGRAM(s) Oral at bedtime  influenza  Vaccine (HIGH DOSE) 0.7 milliLiter(s) IntraMuscular once  Inveltys (loteprednol) 1% 1 Drop(s) 1 Drop(s) Left EYE three times a day  ketorolac 0.5% Ophthalmic Solution 1 Drop(s) Left EYE four times a day  lactulose Syrup 20 Gram(s) Oral at bedtime  morphine  - Injectable 2 milliGRAM(s) IV Push every 4 hours PRN  moxifloxacin 0.5% Solution 1 Drop(s) Left EYE three times a day  multivitamin 1 Tablet(s) Oral daily  prednisoLONE acetate 1% Suspension 1 Drop(s) Left EYE four times a day  propranolol LA 60 milliGRAM(s) Oral daily  rifAXIMin 550 milliGRAM(s) Oral two times a day  spironolactone 25 milliGRAM(s) Oral daily              REVIEW OF SYSTEMS:  CONSTITUTIONAL: No fever, no weight loss, or no fatigue  NECK: No pain, no stiffness  RESPIRATORY: No cough, no wheezing, no chills, no hemoptysis, No shortness of breath  CARDIOVASCULAR: No chest pain, no palpitations, no dizziness, no leg swelling  GASTROINTESTINAL: No abdominal pain. No nausea, no vomiting, no hematemesis; No diarrhea, no constipation. No melena, no hematochezia.  GENITOURINARY: No dysuria, no frequency, no hematuria, no incontinence  NEUROLOGICAL: No headaches, no loss of strength, no numbness, no tremors  SKIN: No itching, no burning  MUSCULOSKELETAL: No joint pain, no swelling; No muscle, no back, no extremity pain  PSYCHIATRIC: No depression, no mood swings,   HEME/LYMPH: No easy bruising, no bleeding gums  ALLERY AND IMMUNOLOGIC: No hives       Consultant(s) Notes Reviewed:  [X] YES  [ ] NO    PHYSICAL EXAM:  GENERAL: NAD  HEAD:  Atraumatic, Normocephalic  EYES: EOMI, PERRLA, conjunctiva and sclera clear  ENMT: No tonsillar erythema, exudates, or enlargement; Moist mucous membranes  NECK: Supple, No JVD  NERVOUS SYSTEM:  Awake & alert  CHEST/LUNG: Clear to auscultation bilaterally; No rales, rhonchi, wheezing,  HEART: Regular rate and rhythm  ABDOMEN: Soft, Nontender, Nondistended; Bowel sounds present  EXTREMITIES:  No clubbing, cyanosis, or edema  LYMPH: No lymphadenopathy noted  SKIN: No rashes      Advanced care planning discussed with patient/family [X] YES   [ ] NO    Advanced care planning discussed with patient/family. Patient's health status was discussed. All appropriate changes have been made regarding patient's end-of-life care. Advanced care planning forms reviewed/discussed/completed.  20 minutes spent.

## 2022-10-07 NOTE — DISCHARGE NOTE PROVIDER - HOSPITAL COURSE
67-year-old female with past medical history alcoholic liver cirrhosis status post TIPS procedure, hepatitis C, hypertension hyperlipidemia inguinal hernia presents to the ER with complaints of 2 months of atraumatic left lower back pain.  This is left the patient bedbound.   states patient is more depressed than usual not getting out of bed is concerned that she is going to develop pneumonia so he brought her to the ER.  Patient denies fever chills nausea vomiting diarrhea.  Patient has been taking Motrin for the left-sided back pain with no improvement.  Denies numbness and tingling to the legs denies weakness.  Denies urinary and bladder incontinence.  No saddle anesthesia.  concerned she may have been taking too many medications or missing medication doses.     MRI Lumbar: Showing L1 Burst Fx, Broad retropulsed Fragment w/ Mild Central Canal Narrowing, moderate degenerative changes.   - received TLSO brace for stability and rotation prevention   - follow up with Dr. Conner in office, call to make an appointment.    >35 minutes spent on discharge

## 2022-10-07 NOTE — DISCHARGE NOTE PROVIDER - NSDCCPCAREPLAN_GEN_ALL_CORE_FT
PRINCIPAL DISCHARGE DIAGNOSIS  Diagnosis: Fracture, lumbar vertebra, compression  Assessment and Plan of Treatment: Follow-up with orthopedics in 1 week  Wear brace

## 2022-11-15 ENCOUNTER — EMERGENCY (EMERGENCY)
Facility: HOSPITAL | Age: 67
LOS: 1 days | Discharge: ANOTHER HOSPITAL | End: 2022-11-15
Attending: EMERGENCY MEDICINE | Admitting: EMERGENCY MEDICINE
Payer: COMMERCIAL

## 2022-11-15 VITALS
DIASTOLIC BLOOD PRESSURE: 55 MMHG | OXYGEN SATURATION: 94 % | SYSTOLIC BLOOD PRESSURE: 103 MMHG | HEART RATE: 92 BPM | RESPIRATION RATE: 16 BRPM | TEMPERATURE: 98 F

## 2022-11-15 VITALS
WEIGHT: 130.07 LBS | DIASTOLIC BLOOD PRESSURE: 58 MMHG | SYSTOLIC BLOOD PRESSURE: 94 MMHG | OXYGEN SATURATION: 95 % | HEART RATE: 95 BPM | HEIGHT: 66 IN | RESPIRATION RATE: 18 BRPM | TEMPERATURE: 97 F

## 2022-11-15 DIAGNOSIS — Z95.828 PRESENCE OF OTHER VASCULAR IMPLANTS AND GRAFTS: Chronic | ICD-10-CM

## 2022-11-15 LAB
ALBUMIN SERPL ELPH-MCNC: 1.3 G/DL — LOW (ref 3.3–5)
ALP SERPL-CCNC: 191 U/L — HIGH (ref 40–120)
ALT FLD-CCNC: 54 U/L — SIGNIFICANT CHANGE UP (ref 12–78)
AMMONIA BLD-MCNC: 36 UMOL/L — HIGH (ref 11–32)
ANION GAP SERPL CALC-SCNC: 10 MMOL/L — SIGNIFICANT CHANGE UP (ref 5–17)
APPEARANCE UR: ABNORMAL
APTT BLD: 37.8 SEC — HIGH (ref 27.5–35.5)
AST SERPL-CCNC: 160 U/L — HIGH (ref 15–37)
BASOPHILS # BLD AUTO: 0.17 K/UL — SIGNIFICANT CHANGE UP (ref 0–0.2)
BASOPHILS NFR BLD AUTO: 1 % — SIGNIFICANT CHANGE UP (ref 0–2)
BILIRUB SERPL-MCNC: 17.8 MG/DL — CRITICAL HIGH (ref 0.2–1.2)
BILIRUB UR-MCNC: ABNORMAL
BUN SERPL-MCNC: 78 MG/DL — HIGH (ref 7–23)
CALCIUM SERPL-MCNC: 7.9 MG/DL — LOW (ref 8.5–10.1)
CHLORIDE SERPL-SCNC: 97 MMOL/L — SIGNIFICANT CHANGE UP (ref 96–108)
CO2 SERPL-SCNC: 26 MMOL/L — SIGNIFICANT CHANGE UP (ref 22–31)
COLOR SPEC: ABNORMAL
CREAT SERPL-MCNC: 2.5 MG/DL — HIGH (ref 0.5–1.3)
DIFF PNL FLD: ABNORMAL
EGFR: 21 ML/MIN/1.73M2 — LOW
EOSINOPHIL # BLD AUTO: 0.34 K/UL — SIGNIFICANT CHANGE UP (ref 0–0.5)
EOSINOPHIL NFR BLD AUTO: 2 % — SIGNIFICANT CHANGE UP (ref 0–6)
ETHANOL SERPL-MCNC: <10 MG/DL — SIGNIFICANT CHANGE UP (ref 0–10)
GLUCOSE SERPL-MCNC: 107 MG/DL — HIGH (ref 70–99)
GLUCOSE UR QL: NEGATIVE — SIGNIFICANT CHANGE UP
HCT VFR BLD CALC: 21.6 % — LOW (ref 34.5–45)
HGB BLD-MCNC: 6.9 G/DL — CRITICAL LOW (ref 11.5–15.5)
INR BLD: 1.99 RATIO — HIGH (ref 0.88–1.16)
KETONES UR-MCNC: ABNORMAL
LACTATE SERPL-SCNC: 3.9 MMOL/L — HIGH (ref 0.7–2)
LEUKOCYTE ESTERASE UR-ACNC: ABNORMAL
LIDOCAIN IGE QN: 237 U/L — SIGNIFICANT CHANGE UP (ref 73–393)
LYMPHOCYTES # BLD AUTO: 1.18 K/UL — SIGNIFICANT CHANGE UP (ref 1–3.3)
LYMPHOCYTES # BLD AUTO: 7 % — LOW (ref 13–44)
MAGNESIUM SERPL-MCNC: 2.7 MG/DL — HIGH (ref 1.6–2.6)
MCHC RBC-ENTMCNC: 31.9 GM/DL — LOW (ref 32–36)
MCHC RBC-ENTMCNC: 40.1 PG — HIGH (ref 27–34)
MCV RBC AUTO: 125.6 FL — HIGH (ref 80–100)
MONOCYTES # BLD AUTO: 1.51 K/UL — HIGH (ref 0–0.9)
MONOCYTES NFR BLD AUTO: 9 % — SIGNIFICANT CHANGE UP (ref 2–14)
NEUTROPHILS # BLD AUTO: 13.27 K/UL — HIGH (ref 1.8–7.4)
NEUTROPHILS NFR BLD AUTO: 75 % — SIGNIFICANT CHANGE UP (ref 43–77)
NITRITE UR-MCNC: NEGATIVE — SIGNIFICANT CHANGE UP
NRBC # BLD: SIGNIFICANT CHANGE UP /100 WBCS (ref 0–0)
PH UR: 5 — SIGNIFICANT CHANGE UP (ref 5–8)
PLATELET # BLD AUTO: 116 K/UL — LOW (ref 150–400)
POTASSIUM SERPL-MCNC: 4 MMOL/L — SIGNIFICANT CHANGE UP (ref 3.5–5.3)
POTASSIUM SERPL-SCNC: 4 MMOL/L — SIGNIFICANT CHANGE UP (ref 3.5–5.3)
PROCALCITONIN SERPL-MCNC: 1.06 NG/ML — HIGH
PROT SERPL-MCNC: 4.6 G/DL — LOW (ref 6–8.3)
PROT UR-MCNC: 30 MG/DL
PROTHROM AB SERPL-ACNC: 23.4 SEC — HIGH (ref 10.5–13.4)
RBC # BLD: 1.72 M/UL — LOW (ref 3.8–5.2)
RBC # FLD: 25.2 % — HIGH (ref 10.3–14.5)
SARS-COV-2 RNA SPEC QL NAA+PROBE: SIGNIFICANT CHANGE UP
SODIUM SERPL-SCNC: 133 MMOL/L — LOW (ref 135–145)
SP GR SPEC: 1.01 — SIGNIFICANT CHANGE UP (ref 1.01–1.02)
UROBILINOGEN FLD QL: 12
WBC # BLD: 16.8 K/UL — HIGH (ref 3.8–10.5)
WBC # FLD AUTO: 16.8 K/UL — HIGH (ref 3.8–10.5)

## 2022-11-15 PROCEDURE — 85730 THROMBOPLASTIN TIME PARTIAL: CPT

## 2022-11-15 PROCEDURE — 80053 COMPREHEN METABOLIC PANEL: CPT

## 2022-11-15 PROCEDURE — 83735 ASSAY OF MAGNESIUM: CPT

## 2022-11-15 PROCEDURE — 84145 PROCALCITONIN (PCT): CPT

## 2022-11-15 PROCEDURE — 99285 EMERGENCY DEPT VISIT HI MDM: CPT

## 2022-11-15 PROCEDURE — U0005: CPT

## 2022-11-15 PROCEDURE — 71045 X-RAY EXAM CHEST 1 VIEW: CPT | Mod: 26

## 2022-11-15 PROCEDURE — 36415 COLL VENOUS BLD VENIPUNCTURE: CPT

## 2022-11-15 PROCEDURE — 83690 ASSAY OF LIPASE: CPT

## 2022-11-15 PROCEDURE — 93010 ELECTROCARDIOGRAM REPORT: CPT

## 2022-11-15 PROCEDURE — 87186 SC STD MICRODIL/AGAR DIL: CPT

## 2022-11-15 PROCEDURE — 81001 URINALYSIS AUTO W/SCOPE: CPT

## 2022-11-15 PROCEDURE — U0003: CPT

## 2022-11-15 PROCEDURE — 36430 TRANSFUSION BLD/BLD COMPNT: CPT

## 2022-11-15 PROCEDURE — 86900 BLOOD TYPING SEROLOGIC ABO: CPT

## 2022-11-15 PROCEDURE — 87150 DNA/RNA AMPLIFIED PROBE: CPT

## 2022-11-15 PROCEDURE — 87077 CULTURE AEROBIC IDENTIFY: CPT

## 2022-11-15 PROCEDURE — 87086 URINE CULTURE/COLONY COUNT: CPT

## 2022-11-15 PROCEDURE — 80307 DRUG TEST PRSMV CHEM ANLYZR: CPT

## 2022-11-15 PROCEDURE — 71045 X-RAY EXAM CHEST 1 VIEW: CPT

## 2022-11-15 PROCEDURE — 87040 BLOOD CULTURE FOR BACTERIA: CPT

## 2022-11-15 PROCEDURE — 86901 BLOOD TYPING SEROLOGIC RH(D): CPT

## 2022-11-15 PROCEDURE — 96374 THER/PROPH/DIAG INJ IV PUSH: CPT

## 2022-11-15 PROCEDURE — 85025 COMPLETE CBC W/AUTO DIFF WBC: CPT

## 2022-11-15 PROCEDURE — 74176 CT ABD & PELVIS W/O CONTRAST: CPT | Mod: MA

## 2022-11-15 PROCEDURE — P9016: CPT

## 2022-11-15 PROCEDURE — 71250 CT THORAX DX C-: CPT | Mod: 26,MA

## 2022-11-15 PROCEDURE — 96375 TX/PRO/DX INJ NEW DRUG ADDON: CPT

## 2022-11-15 PROCEDURE — 74176 CT ABD & PELVIS W/O CONTRAST: CPT | Mod: 26,MA

## 2022-11-15 PROCEDURE — 82140 ASSAY OF AMMONIA: CPT

## 2022-11-15 PROCEDURE — 86850 RBC ANTIBODY SCREEN: CPT

## 2022-11-15 PROCEDURE — 83605 ASSAY OF LACTIC ACID: CPT

## 2022-11-15 PROCEDURE — 93005 ELECTROCARDIOGRAM TRACING: CPT

## 2022-11-15 PROCEDURE — 99291 CRITICAL CARE FIRST HOUR: CPT | Mod: 25

## 2022-11-15 PROCEDURE — 71250 CT THORAX DX C-: CPT | Mod: MA

## 2022-11-15 PROCEDURE — 86923 COMPATIBILITY TEST ELECTRIC: CPT

## 2022-11-15 PROCEDURE — 85610 PROTHROMBIN TIME: CPT

## 2022-11-15 RX ORDER — PANTOPRAZOLE SODIUM 20 MG/1
40 TABLET, DELAYED RELEASE ORAL ONCE
Refills: 0 | Status: COMPLETED | OUTPATIENT
Start: 2022-11-15 | End: 2022-11-15

## 2022-11-15 RX ORDER — PIPERACILLIN AND TAZOBACTAM 4; .5 G/20ML; G/20ML
3.38 INJECTION, POWDER, LYOPHILIZED, FOR SOLUTION INTRAVENOUS ONCE
Refills: 0 | Status: COMPLETED | OUTPATIENT
Start: 2022-11-15 | End: 2022-11-15

## 2022-11-15 RX ORDER — SODIUM CHLORIDE 9 MG/ML
1000 INJECTION INTRAMUSCULAR; INTRAVENOUS; SUBCUTANEOUS ONCE
Refills: 0 | Status: COMPLETED | OUTPATIENT
Start: 2022-11-15 | End: 2022-11-15

## 2022-11-15 RX ADMIN — SODIUM CHLORIDE 500 MILLILITER(S): 9 INJECTION INTRAMUSCULAR; INTRAVENOUS; SUBCUTANEOUS at 09:16

## 2022-11-15 RX ADMIN — PANTOPRAZOLE SODIUM 40 MILLIGRAM(S): 20 TABLET, DELAYED RELEASE ORAL at 10:36

## 2022-11-15 RX ADMIN — PIPERACILLIN AND TAZOBACTAM 200 GRAM(S): 4; .5 INJECTION, POWDER, LYOPHILIZED, FOR SOLUTION INTRAVENOUS at 10:36

## 2022-11-15 NOTE — ED PROVIDER NOTE - CRITICAL CARE ATTENDING CONTRIBUTION TO CARE
67-year-old female with past medical history of alcoholic liver cirrhosis status post TIPS procedure in 2020, hep C, hypertension, hyperlipidemia, recent admission to [place] for L1 burst fracture treated conservatively, presents to the emergency department by ambulance from home with report of abdominal discomfort and distention, increased jaundice and ascites, decreased appetite increased lethargy.   at the bedside states that this morning patient had a bowel movement in the bed and was too weak to get up and call for ambulance.  Patient states that she feels weak and has abdominal distention and  states that she is more jaundiced than usual.  Patient arouses to voice he is alert, patient is still looking jaundiced abdomen is distended, concern for liver failure and renal failure, noted low blood pressure to rule out sepsis, send CBC, CMP, blood cultures, lactate level, CT chest CT abdomen and pelvis, start IV fluids and antibiotics and call gastroenterology. This patient was seen and shared with the D[facility]. I have personally performed a face to face diagnostic evaluation on this patient.  I have reviewed the PA note and agree with the history, exam, and plan of care, except as noted.  History and Exam by me shows     67-year-old female with past medical history of alcoholic liver cirrhosis status post TIPS procedure in 2020, hep C, hypertension, hyperlipidemia, recent admission to [place] for L1 burst fracture treated conservatively, presents to the emergency department by ambulance from home with report of abdominal discomfort and distention, increased jaundice and ascites, decreased appetite increased lethargy.   at the bedside states that this morning patient had a bowel movement in the bed and was too weak to get up and call for ambulance.  Patient states that she feels weak and has abdominal distention and  states that she is more jaundiced than usual.  Patient arouses to voice he is alert, patient is still looking jaundiced abdomen is distended, concern for liver failure and renal failure, noted low blood pressure to rule out sepsis, send CBC, CMP, blood cultures, lactate level, CT chest CT abdomen and pelvis, start IV fluids and antibiotics and call gastroenterology.

## 2022-11-15 NOTE — CONSULT NOTE ADULT - SUBJECTIVE AND OBJECTIVE BOX
[facility]  Montez Gordon PA-C  [address] [place] [id number] [phone]      Chief Complaint:  Patient is a 67y old  Female who presents with a chief complaint of abdominal pain and nausea    HPI:  68 yo F PMHx alcoholic liver cirrhosis s/p TIPS procedure, hepatitis C, hypertension, hyperlipidemia BIBEMS for evaluation of generalized weakness, N/V x yesterday. Pt A&Ox4, but poor historian. Pt reports diffuse body pain. States she has chronic abdominal pain. Pt denies fever/chills, chest pain, SOB, dysuria.    INTERVAL HPI:  Pt seen and examined in emergency department with  at bedside  they report same history as above  pt is lethargic, arousable but appears to be in pain  reports diffuse pain, weakness. +N/V, +jaundice  no further GI complaints    Allergies:  No Known Allergies      Medications:      PMHX/PSHX:  Inguinal hernia    Umbilical hernia    HTN (hypertension)    HLD (hyperlipidemia)    Cirrhosis    Hepatitis C    No significant past surgical history    S/P TIPS (transjugular intrahepatic portosystemic shunt)        Family history:  FH: diabetes mellitus (Father)      ROS:   General:  No wt loss, fevers, chills, night sweats, fatigue,   Eyes:  Good vision, no reported pain  ENT:  No sore throat, pain, runny nose, dysphagia  CV:  No pain, palpitations, hypo/hypertension  Resp:  No dyspnea, cough, tachypnea, wheezing  GI:  +pain, +nausea, +vomiting, No diarrhea, No constipation, No weight loss, No fever, No pruritis, No rectal bleeding, No tarry stools, No dysphagia  :  No pain, bleeding, incontinence, nocturia  Muscle:  No pain, weakness  Neuro:  No weakness, tingling, memory problems  Psych:  No fatigue, insomnia, mood problems, depression  Endocrine:  No polyuria, polydipsia, cold/heat intolerance  Heme:  No petechiae, ecchymosis, easy bruisability  Skin:  No rash, tattoos, scars, edema      PHYSICAL EXAM:   Vital Signs:  Vital Signs Last 24 Hrs  T(C): 36.3 (15 Nov 2022 11:56), Max: 36.3 (15 Nov 2022 11:39)  T(F): 97.4 (15 Nov 2022 11:56), Max: 97.4 (15 Nov 2022 11:39)  HR: 93 (15 Nov 2022 11:56) (85 - 95)  BP: 106/51 (15 Nov 2022 11:56) (94/58 - 111/52)  BP(mean): --  RR: 16 (15 Nov 2022 11:56) (16 - 18)  SpO2: 92% (15 Nov 2022 11:56) (92% - 95%)    Parameters below as of 15 Nov 2022 11:56  Patient On (Oxygen Delivery Method): room air      Daily Height in cm: 167.64 (15 Nov 2022 08:41)    Daily     GENERAL:  Appears stated age  ABDOMEN:  Soft, +TTP diffusely, +distended  SKIN: +jaundice  NEURO: Lethargic    LABS:                        6.9    16.80 )-----------( 116      ( 15 Nov 2022 09:09 )             21.6     11-15    133<L>  |  97  |  78<H>  ----------------------------<  107<H>  4.0   |  26  |  2.50<H>    Ca    7.9<L>      15 Nov 2022 09:09  Mg     2.7     15    TPro  4.6<L>  /  Alb  1.3<L>  /  TBili  17.8<HH>  /  DBili  x   /  AST  160<H>  /  ALT  54  /  AlkPhos  191<H>  11-15    LIVER FUNCTIONS - ( 15 Nov 2022 09:09 )  Alb: 1.3 g/dL / Pro: 4.6 g/dL / ALK PHOS: 191 U/L / ALT: 54 U/L / AST: 160 U/L / GGT: x           PT/INR - ( 15 Nov 2022 09:09 )   PT: 23.4 sec;   INR: 1.99 ratio         PTT - ( 15 Nov 2022 09:09 )  PTT:37.8 sec  Urinalysis Basic - ( 15 Nov 2022 09:35 )    Color: Miladys / Appearance: Slightly Turbid / S.015 / pH: x  Gluc: x / Ketone: Trace  / Bili: Large / Urobili: 12   Blood: x / Protein: 30 mg/dL / Nitrite: Negative   Leuk Esterase: Trace / RBC: 0-2 /HPF / WBC 0-2   Sq Epi: x / Non Sq Epi: Few / Bacteria: Moderate        [id number]    Lipase [id number]      Imaging:  < from: CT Abdomen and Pelvis No Cont (11.15.22 @ 10:58) >    ACC: [id number] EXAM:  CT ABDOMEN AND PELVIS                        ACC: [id number] EXAM:  CT CHEST                          PROCEDURE DATE:  11/15/2022          INTERPRETATION:  CLINICAL INFORMATION: Weakness, jaundice.    COMPARISON: CT chest abdomenand pelvis 10/5/2022    CONTRAST/COMPLICATIONS:  IV Contrast: NONE  Oral Contrast: NONE  Complications: None reported at time of study completion    PROCEDURE:  CT of the Chest, Abdomen and Pelvis was performed.  Sagittal and coronal reformats were performed.    FINDINGS:  CHEST:  LUNGS AND LARGE AIRWAYS: Patent central airways. Moderate sized bilateral   pleural effusions left larger than right increased in size compared to   prior. Bibasilar dependent atelectasis/consolidation, left worse than   right. Correlate clinically for concomitant infection..  PLEURA: As above  VESSELS: Normal caliber. Atherosclerotic aorta.  HEART: Cardiomegaly with cardiac vascular opacification. Decreased   cardiac chamber blood pool attenuation suggesting anemicstate.   Pericardial calcification similar to prior. No pericardial effusion.  MEDIASTINUM AND HILDA: No lymphadenopathy. Large hiatal hernia. Left   thyroid lobe calcification.  CHEST WALL AND LOWER NECK: Within normal limits.    ABDOMEN AND PELVIS:  LIVER: Cirrhotic. Evaluation for space-occupying lesion limited on this   noncontrast study.  BILE DUCTS: Normal caliber.  GALLBLADDER: Cholelithiasis. Mild nonspecific gallbladder wall thickening   similar to prior likely reactive related to asciteshypoalbuminemia or   adjacent liver disease SPLEEN: Within normal limits.  PANCREAS: Within normal limits.  ADRENALS: Within normal limits.  KIDNEYS/URETERS: Within normal limits.    BLADDER: Not adequately distended.  REPRODUCTIVE ORGANS: Unremarkable    BOWEL: Evaluation of the alimentary tube is limited by nonopacification   underdistention. No bowel obstruction. Colonic diverticulosis without   acute diverticulitis. Thickened transverse and right colon is nonspecific   and may be reactive related to hepatic colopathy. Cannot exclude colitis   in the appropriate setting..  Appendix no appendicitis  PERITONEUM: Moderate abdominal pelvic ascites measuring simple fluid   significantly increased compared to prior. No extraluminal gas or   organized collections.  VESSELS: Nonaneurysmal abdominal aorta. TIPS stent reidentified in situ.   Perigastric varices are not well visualized on this noncontrast study.  RETROPERITONEUM/LYMPH NODES: No lymphadenopathy.  ABDOMINAL WALL: A groin hernia containing ascites. Moderate diffuse   anasarca increased.  BONES: Stable L1 compression deformity with retropulsion. Multiple old   right rib fractures.    IMPRESSION:  Bilateral moderate pleural effusions with bibasilar dependent   consolidation/atelectasis, increased.    Cirrhosis.    Moderate abdominal pelvic ascites with significant interval increase.    Transverse and right colon wall thickening may be reactive. Cannot   exclude colitis in the appropriate clinical setting.    Diverticulosis coli without acute diverticulitis.    Additional findings as discussed    --- End of Report ---      SAMANTHA WHITMAN MD; Attending Radiologist  This document has been electronically signed. Nov 15 2022 11:47AM    < end of copied text >

## 2022-11-15 NOTE — ED ADULT NURSE NOTE - OBJECTIVE STATEMENT
Patient is a 66 yo Female  PMHx alcoholic liver cirrhosis s/p TIPS procedure, hepatitis C, hypertension, hyperlipidemia BIBEMS for evaluation of generalized weakness, N/V x yesterday. Pt A&Ox4, but poor historian. Pt reports diffuse body pain. States she has chronic abdominal pain. Pt denies fever/chills, chest pain, SOB, dysuria.

## 2022-11-15 NOTE — CONSULT NOTE ADULT - ASSESSMENT
Abdominal pain, Nausea/vomiting  Ascites  ?Colitis  Cirrhosis s/p TIPS  Hx hepatitis C    CT noted, d/w family  Transfuse as needed, monitor cbc  Trend LFT  Follow FOBT and septic workup  Currently planning for transfer for tertiary care management  D/w pt and   D/w ER     I reviewed the overnight course of events on the unit, re-confirming the patient history. I discussed the care with the patient and their family  Differential diagnosis and plan of care discussed with patient after the evaluation  35 minutes spent on total encounter of which more than fifty percent of the encounter was spent counseling and/or coordinating care by the attending physician.  Advanced care planning was discussed with patient and family.  Advanced care planning forms were reviewed and discussed.  Risks, benefits and alternatives of gastroenterologic procedures were discussed in detail and all questions were answered.

## 2022-11-15 NOTE — ED PROVIDER NOTE - CARE PLAN
1 Principal Discharge DX:	BON (acute kidney injury)  Secondary Diagnosis:	Sepsis with acute liver failure  Secondary Diagnosis:	Anemia   Principal Discharge DX:	End stage liver disease  Secondary Diagnosis:	Sepsis with acute liver failure  Secondary Diagnosis:	Anemia  Secondary Diagnosis:	Acute renal failure (ARF)

## 2022-11-15 NOTE — ED ADULT TRIAGE NOTE - CHIEF COMPLAINT QUOTE
Patient A/Ox4 with clear speech. Patient c/o ABD pain, nausea and diarrhea. Patient with apparent jaundice and ascites in triage. Per EMS house is disheveled as well.

## 2022-11-15 NOTE — ED PROVIDER NOTE - OBJECTIVE STATEMENT
66 yo F PMHx alcoholic liver cirrhosis s/p TIPS procedure, hepatitis C, hypertension, hyperlipidemia BIBEMS for evaluation of generalized weakness, N/V x yesterday. Pt A&Ox4, but poor historian. Pt reports diffuse body pain. States she has chronic abdominal pain. Pt denies fever/chills, chest pain, SOB, dysuria.

## 2022-11-15 NOTE — ED ADULT NURSE NOTE - NSIMPLEMENTINTERV_GEN_ALL_ED
Implemented All Fall Risk Interventions:  [place] to call system. Call bell, personal items and telephone within reach. Instruct patient to call for assistance. Room bathroom lighting operational. Non-slip footwear when patient is off stretcher. Physically safe environment: no spills, clutter or unnecessary equipment. Stretcher in lowest position, wheels locked, appropriate side rails in place. Provide visual cue, wrist band, yellow gown, etc. Monitor gait and stability. Monitor for mental status changes and reorient to person, place, and time. Review medications for side effects contributing to fall risk. Reinforce activity limits and safety measures with patient and family.

## 2022-11-15 NOTE — ED PROVIDER NOTE - CLINICAL SUMMARY MEDICAL DECISION MAKING FREE TEXT BOX
67-year-old female with past medical history of alcoholic liver cirrhosis status post TIPS procedure in 2020, hep C, hypertension, hyperlipidemia, recent admission to [facility] for L1 burst fracture treated conservatively, presents to the emergency department by ambulance from home with report of abdominal discomfort and distention, increased jaundice and ascites, decreased appetite increased lethargy.   at the bedside states that this morning patient had a bowel movement in the bed and was too weak to get up and call for ambulance.  Patient states that she feels weak and has abdominal distention and  states that she is more jaundiced than usual.  Patient arouses to voice he is alert, patient is still looking jaundiced abdomen is distended, concern for liver failure and renal failure, noted low blood pressure to rule out sepsis, send CBC, CMP, blood cultures, lactate level, CT chest CT abdomen and pelvis, start IV fluids and antibiotics and call gastroenterology.

## 2022-11-15 NOTE — ED PROVIDER NOTE - PROGRESS NOTE DETAILS
Spoke with hepatology fellow Dr. Karen Castelan/Dr. Toribio- attending. Pt accepted for transfer to [facility]. Fellow requests us abd duplex to access tips however US tech states this test cannot be performed prior to transfer as it will take 1hr+ and requires prep  Pt continues hemodynamically stable.

## 2022-11-16 LAB
-  CTX-M RESISTANCE MARKER: SIGNIFICANT CHANGE UP
-  ESBL: SIGNIFICANT CHANGE UP
-  STREPTOCOCCUS SP. (NOT GRP A, B OR S PNEUMONIAE): SIGNIFICANT CHANGE UP
CULTURE RESULTS: SIGNIFICANT CHANGE UP
E COLI DNA BLD POS QL NAA+NON-PROBE: SIGNIFICANT CHANGE UP
GRAM STN FLD: SIGNIFICANT CHANGE UP
METHOD TYPE: SIGNIFICANT CHANGE UP
METHOD TYPE: SIGNIFICANT CHANGE UP
SPECIMEN SOURCE: SIGNIFICANT CHANGE UP

## 2022-11-16 NOTE — PROVIDER CONTACT NOTE (CRITICAL VALUE NOTIFICATION) - NAME OF MD/NP/PA/DO NOTIFIED:
pt transferred to [facility] ED, reported result to Kaycee on care team Dr. Lonny bhagat, pt transferred to [facility], reported result to Kaycee on care team

## 2022-11-16 NOTE — PROVIDER CONTACT NOTE (CRITICAL VALUE NOTIFICATION) - TEST AND RESULT REPORTED:
Blood cultures drawn 11/15, growth in aerobic and anaerobic bottles - gram negative rods
Bilirubin 17.8 Lactate 3.9
Hemoglobin 6.9

## 2022-11-17 LAB
-  AMIKACIN: SIGNIFICANT CHANGE UP
-  AMPICILLIN/SULBACTAM: SIGNIFICANT CHANGE UP
-  AMPICILLIN: SIGNIFICANT CHANGE UP
-  AZTREONAM: SIGNIFICANT CHANGE UP
-  CEFAZOLIN: SIGNIFICANT CHANGE UP
-  CEFEPIME: SIGNIFICANT CHANGE UP
-  CEFOXITIN: SIGNIFICANT CHANGE UP
-  CEFTRIAXONE: SIGNIFICANT CHANGE UP
-  CIPROFLOXACIN: SIGNIFICANT CHANGE UP
-  CLINDAMYCIN: SIGNIFICANT CHANGE UP
-  ERTAPENEM: SIGNIFICANT CHANGE UP
-  ERYTHROMYCIN: SIGNIFICANT CHANGE UP
-  GENTAMICIN: SIGNIFICANT CHANGE UP
-  IMIPENEM: SIGNIFICANT CHANGE UP
-  LEVOFLOXACIN: SIGNIFICANT CHANGE UP
-  MEROPENEM: SIGNIFICANT CHANGE UP
-  PENICILLIN: SIGNIFICANT CHANGE UP
-  PIPERACILLIN/TAZOBACTAM: SIGNIFICANT CHANGE UP
-  TOBRAMYCIN: SIGNIFICANT CHANGE UP
-  TRIMETHOPRIM/SULFAMETHOXAZOLE: SIGNIFICANT CHANGE UP
-  VANCOMYCIN: SIGNIFICANT CHANGE UP
CULTURE RESULTS: SIGNIFICANT CHANGE UP
CULTURE RESULTS: SIGNIFICANT CHANGE UP
METHOD TYPE: SIGNIFICANT CHANGE UP
ORGANISM # SPEC MICROSCOPIC CNT: SIGNIFICANT CHANGE UP
SPECIMEN SOURCE: SIGNIFICANT CHANGE UP
SPECIMEN SOURCE: SIGNIFICANT CHANGE UP

## 2022-11-29 NOTE — PATIENT PROFILE ADULT - HAVE YOU EXPERIENCED VIOLENCE OR A TRAUMATIC EVENT?
no Unspecified dementia, severe, without behavioral disturbance, psychotic disturbance, mood disturbance, and anxiety

## 2023-02-14 NOTE — ED PROVIDER NOTE - NS_BEDUNITTYPES_ED_ALL_ED
SUBJECTIVE:    Patient ID: Yeyo Hollingsworth is a 83 y.o. male.    Chief Complaint: Hospital Follow Up  83 year old male with history of SBO (recurrent), PAF (apixaban), DM 2, HTN, Morbid Obesity with BRANDI (CPAP) presented to ED complaining of 5 hour history of worsening generalized abdominal pain, worse with movement or eating. No N/V. No change in BM.  Patient was diagnosed with a small-bowel partial obstruction he was kept NPO, his symptoms resolved he has returned home he is currently asymptomatic.            05/5/2020  244lbs  07/22/2020 247  09/28/2020 245  03/29/2021 235  04/30/2021 231  06/28/2021 231  12/28/2021 230  06/28/2022 227  09/28/2022 231  01/12/2023 233  02/14/2023 229        SPMHx:  DM: Jardiance 10mg, Januvia 100mg, not on metformin due to diarrhea Last A1C 6.0  is doing well.  HTN: Lisinopril 20mg, Metoprolol 25mg, is well controlled.  Hyperlipidemia: Atorvastatin 10 mg well controlled. LDL 59  Arthritis: On several chronic narcotics and follows up with pain management, for his neck and back pain pt is planning to have a procedure.  Anxiety: Takes Valium 5mg, PRN  A-fib: 2012 Was cardioverted, no longer in a-fib  Insomnia: Trazadone 100mg  Hx of DVT: Currently on Eliquis 5mg   BRANDI: Wears CPAP  Chronic constipation: On polyethylene Glycol 17g, manages well with this medication.     Specialists:  Cardiology: Dr Mcclelland  Pain Management: Michele Martinez  Ortho: Dr Dean  Podiatry: Dr Mandujano  Sleep: Dr Felice Bello  GS: Dr Thorne     Smoke: Quit in 1988  ETOH: None  Exercise: Never    HPI    Admit Date: 2/1/23   Discharge Date: 2/4/23  Discharge Facility: Hospital    Medication Reconciliation:  No medication changes. No changes  New Prescriptions filled after discharge: not applicable  Discharge summary reviewed:  yes  Pending test results at discharge reviewed:   no  Follow up appointments scheduled:  not applicable                Follow up labs/tests ordered:   not applicable  Home Health ordered  on discharge:   not applicable  Home Health company name:  NA  DME ordered at discharge:   not applicable  How patient is feeling since discharge from the hospital?  Improved     Patient follow up phone call documented on separate encounter.      Admission on 02/01/2023, Discharged on 02/04/2023   Component Date Value Ref Range Status    WBC 02/02/2023 15.13 (H)  3.90 - 12.70 K/uL Final    RBC 02/02/2023 5.41  4.60 - 6.20 M/uL Final    Hemoglobin 02/02/2023 16.5  14.0 - 18.0 g/dL Final    Hematocrit 02/02/2023 51.9  40.0 - 54.0 % Final    MCV 02/02/2023 96  82 - 98 fL Final    MCH 02/02/2023 30.5  27.0 - 31.0 pg Final    MCHC 02/02/2023 31.8 (L)  32.0 - 36.0 g/dL Final    RDW 02/02/2023 14.1  11.5 - 14.5 % Final    Platelets 02/02/2023 151  150 - 450 K/uL Final    MPV 02/02/2023 9.2  9.2 - 12.9 fL Final    Immature Granulocytes 02/02/2023 0.5  0.0 - 0.5 % Final    Gran # (ANC) 02/02/2023 12.9 (H)  1.8 - 7.7 K/uL Final    Immature Grans (Abs) 02/02/2023 0.07 (H)  0.00 - 0.04 K/uL Final    Lymph # 02/02/2023 0.7 (L)  1.0 - 4.8 K/uL Final    Mono # 02/02/2023 1.3 (H)  0.3 - 1.0 K/uL Final    Eos # 02/02/2023 0.1  0.0 - 0.5 K/uL Final    Baso # 02/02/2023 0.03  0.00 - 0.20 K/uL Final    nRBC 02/02/2023 0  0 /100 WBC Final    Gran % 02/02/2023 85.3 (H)  38.0 - 73.0 % Final    Lymph % 02/02/2023 4.6 (L)  18.0 - 48.0 % Final    Mono % 02/02/2023 8.7  4.0 - 15.0 % Final    Eosinophil % 02/02/2023 0.7  0.0 - 8.0 % Final    Basophil % 02/02/2023 0.2  0.0 - 1.9 % Final    Differential Method 02/02/2023 Automated   Final    Sodium 02/02/2023 140  136 - 145 mmol/L Final    Potassium 02/02/2023 4.3  3.5 - 5.1 mmol/L Final    Chloride 02/02/2023 103  95 - 110 mmol/L Final    CO2 02/02/2023 26  23 - 29 mmol/L Final    Glucose 02/02/2023 177 (H)  70 - 110 mg/dL Final    BUN 02/02/2023 29 (H)  8 - 23 mg/dL Final    Creatinine 02/02/2023 1.3  0.5 - 1.4 mg/dL Final    Calcium 02/02/2023 8.9  8.7 - 10.5 mg/dL Final    Total Protein  02/02/2023 7.2  6.0 - 8.4 g/dL Final    Albumin 02/02/2023 4.3  3.5 - 5.2 g/dL Final    Total Bilirubin 02/02/2023 1.1 (H)  0.1 - 1.0 mg/dL Final    Alkaline Phosphatase 02/02/2023 38 (L)  55 - 135 U/L Final    AST 02/02/2023 26  10 - 40 U/L Final    ALT 02/02/2023 38  10 - 44 U/L Final    Anion Gap 02/02/2023 11  8 - 16 mmol/L Final    eGFR 02/02/2023 55.2 (A)  >60 mL/min/1.73 m^2 Final    Lipase 02/02/2023 31  4 - 60 U/L Final    Specimen UA 02/02/2023 Urine, Clean Catch   Final    Color, UA 02/02/2023 Yellow  Yellow, Straw, Hoda Final    Appearance, UA 02/02/2023 Clear  Clear Final    pH, UA 02/02/2023 6.0  5.0 - 8.0 Final    Specific Gravity, UA 02/02/2023 >1.030 (A)  1.005 - 1.030 Final    Protein, UA 02/02/2023 1+ (A)  Negative Final    Glucose, UA 02/02/2023 4+ (A)  Negative Final    Ketones, UA 02/02/2023 Negative  Negative Final    Bilirubin (UA) 02/02/2023 Negative  Negative Final    Occult Blood UA 02/02/2023 Negative  Negative Final    Nitrite, UA 02/02/2023 Negative  Negative Final    Urobilinogen, UA 02/02/2023 Negative  Negative EU/dL Final    Leukocytes, UA 02/02/2023 Negative  Negative Final    Troponin I High Sensitivity 02/02/2023 6.7  0.0 - 14.9 pg/mL Final    BNP 02/02/2023 36  0 - 99 pg/mL Final    Lactate (Lactic Acid) 02/02/2023 2.2 (HH)  0.5 - 1.9 mmol/L Final    POC Creatinine 02/02/2023 1.3  0.5 - 1.4 mg/dL Final    Sample 02/02/2023 VENOUS   Final    POC PH 02/02/2023 7.329 (L)  7.35 - 7.45 Final    POC PCO2 02/02/2023 52.6 (H)  35 - 45 mmHg Final    POC PO2 02/02/2023 27 (L)  40 - 60 mmHg Final    POC HCO3 02/02/2023 27.7  24 - 28 mmol/L Final    POC BE 02/02/2023 2  -2 to 2 mmol/L Final    POC SATURATED O2 02/02/2023 45 (L)  95 - 100 % Final    POC TCO2 02/02/2023 29  24 - 29 mmol/L Final    Sample 02/02/2023 VENOUS   Final    Site 02/02/2023 [place]   Final    Allens Test 02/02/2023 N/A   Final    DelSys 02/02/2023 Room Air   Final    Lactate (Lactic Acid) 02/02/2023 1.9  0.5 - 1.9  mmol/L Final    RBC, UA 02/02/2023 1  0 - 4 /hpf Final    WBC, UA 02/02/2023 1  0 - 5 /hpf Final    Bacteria 02/02/2023 Negative  None-Occ /hpf Final    Yeast, UA 02/02/2023 None  None Final    Squam Epithel, UA 02/02/2023 1  /hpf Final    Hyaline Casts, UA 02/02/2023 2 (A)  0-1/lpf /lpf Final    Microscopic Comment 02/02/2023 SEE COMMENT   Final    WBC 02/03/2023 7.40  3.90 - 12.70 K/uL Final    RBC 02/03/2023 4.47 (L)  4.60 - 6.20 M/uL Final    Hemoglobin 02/03/2023 13.8 (L)  14.0 - 18.0 g/dL Final    Hematocrit 02/03/2023 44.3  40.0 - 54.0 % Final    MCV 02/03/2023 99 (H)  82 - 98 fL Final    MCH 02/03/2023 30.9  27.0 - 31.0 pg Final    MCHC 02/03/2023 31.2 (L)  32.0 - 36.0 g/dL Final    RDW 02/03/2023 14.2  11.5 - 14.5 % Final    Platelets 02/03/2023 128 (L)  150 - 450 K/uL Final    MPV 02/03/2023 9.2  9.2 - 12.9 fL Final    Immature Granulocytes 02/03/2023 0.3  0.0 - 0.5 % Final    Gran # (ANC) 02/03/2023 5.1  1.8 - 7.7 K/uL Final    Immature Grans (Abs) 02/03/2023 0.02  0.00 - 0.04 K/uL Final    Lymph # 02/03/2023 1.4  1.0 - 4.8 K/uL Final    Mono # 02/03/2023 0.6  0.3 - 1.0 K/uL Final    Eos # 02/03/2023 0.2  0.0 - 0.5 K/uL Final    Baso # 02/03/2023 0.01  0.00 - 0.20 K/uL Final    nRBC 02/03/2023 0  0 /100 WBC Final    Gran % 02/03/2023 69.0  38.0 - 73.0 % Final    Lymph % 02/03/2023 18.9  18.0 - 48.0 % Final    Mono % 02/03/2023 8.5  4.0 - 15.0 % Final    Eosinophil % 02/03/2023 3.2  0.0 - 8.0 % Final    Basophil % 02/03/2023 0.1  0.0 - 1.9 % Final    Differential Method 02/03/2023 Automated   Final    Sodium 02/03/2023 140  136 - 145 mmol/L Final    Potassium 02/03/2023 4.1  3.5 - 5.1 mmol/L Final    Chloride 02/03/2023 108  95 - 110 mmol/L Final    CO2 02/03/2023 27  23 - 29 mmol/L Final    Glucose 02/03/2023 92  70 - 110 mg/dL Final    BUN 02/03/2023 28 (H)  8 - 23 mg/dL Final    Creatinine 02/03/2023 1.0  0.5 - 1.4 mg/dL Final    Calcium 02/03/2023 7.9 (L)  8.7 - 10.5 mg/dL Final    Anion Gap 02/03/2023  5 (L)  8 - 16 mmol/L Final    eGFR 02/03/2023 >60.0  >60 mL/min/1.73 m^2 Final    POC Glucose 02/03/2023 100  70 - 110 Final    POC Glucose 02/03/2023 110  70 - 110 Final    POC Glucose 02/04/2023 93  70 - 110 Final   Lab Visit on 01/05/2023   Component Date Value Ref Range Status    Hemoglobin A1C 01/05/2023 6.0  4.5 - 6.2 % Final    Estimated Avg Glucose 01/05/2023 126  68 - 131 mg/dL Final    Cholesterol 01/05/2023 123  120 - 199 mg/dL Final    Triglycerides 01/05/2023 133  30 - 150 mg/dL Final    HDL 01/05/2023 40  40 - 75 mg/dL Final    LDL Cholesterol 01/05/2023 56.4 (L)  63.0 - 159.0 mg/dL Final    HDL/Cholesterol Ratio 01/05/2023 32.5  20.0 - 50.0 % Final    Total Cholesterol/HDL Ratio 01/05/2023 3.1  2.0 - 5.0 Final    Non-HDL Cholesterol 01/05/2023 83  mg/dL Final   Admission on 11/23/2022, Discharged on 11/23/2022   Component Date Value Ref Range Status    HIV 1/2 Ag/Ab 11/23/2022 Non-reactive  Non-reactive Final    Hepatitis C Ab 11/23/2022 Non-reactive  Non-reactive Final    Sodium 11/23/2022 144  136 - 145 mmol/L Final    Potassium 11/23/2022 4.4  3.5 - 5.1 mmol/L Final    Chloride 11/23/2022 106  95 - 110 mmol/L Final    CO2 11/23/2022 31 (H)  23 - 29 mmol/L Final    Glucose 11/23/2022 159 (H)  70 - 110 mg/dL Final    BUN 11/23/2022 25 (H)  8 - 23 mg/dL Final    Creatinine 11/23/2022 1.2  0.5 - 1.4 mg/dL Final    Calcium 11/23/2022 9.2  8.7 - 10.5 mg/dL Final    Total Protein 11/23/2022 6.5  6.0 - 8.4 g/dL Final    Albumin 11/23/2022 3.7  3.5 - 5.2 g/dL Final    Total Bilirubin 11/23/2022 0.6  0.1 - 1.0 mg/dL Final    Alkaline Phosphatase 11/23/2022 39 (L)  55 - 135 U/L Final    AST 11/23/2022 16  10 - 40 U/L Final    ALT 11/23/2022 22  10 - 44 U/L Final    Anion Gap 11/23/2022 7 (L)  8 - 16 mmol/L Final    eGFR 11/23/2022 >60  >60 mL/min/1.73 m^2 Final    WBC 11/23/2022 5.03  3.90 - 12.70 K/uL Final    RBC 11/23/2022 4.74  4.60 - 6.20 M/uL Final    Hemoglobin 11/23/2022 14.5  14.0 - 18.0 g/dL Final     Hematocrit 11/23/2022 45.6  40.0 - 54.0 % Final    MCV 11/23/2022 96  82 - 98 fL Final    MCH 11/23/2022 30.6  27.0 - 31.0 pg Final    MCHC 11/23/2022 31.8 (L)  32.0 - 36.0 g/dL Final    RDW 11/23/2022 13.7  11.5 - 14.5 % Final    Platelets 11/23/2022 173  150 - 450 K/uL Final    MPV 11/23/2022 9.6  9.2 - 12.9 fL Final    Immature Granulocytes 11/23/2022 0.2  0.0 - 0.5 % Final    Gran # (ANC) 11/23/2022 3.1  1.8 - 7.7 K/uL Final    Immature Grans (Abs) 11/23/2022 0.01  0.00 - 0.04 K/uL Final    Lymph # 11/23/2022 1.3  1.0 - 4.8 K/uL Final    Mono # 11/23/2022 0.3  0.3 - 1.0 K/uL Final    Eos # 11/23/2022 0.2  0.0 - 0.5 K/uL Final    Baso # 11/23/2022 0.02  0.00 - 0.20 K/uL Final    nRBC 11/23/2022 0  0 /100 WBC Final    Gran % 11/23/2022 61.8  38.0 - 73.0 % Final    Lymph % 11/23/2022 26.4  18.0 - 48.0 % Final    Mono % 11/23/2022 6.8  4.0 - 15.0 % Final    Eosinophil % 11/23/2022 4.4  0.0 - 8.0 % Final    Basophil % 11/23/2022 0.4  0.0 - 1.9 % Final    Differential Method 11/23/2022 Automated   Final    Lipase 11/23/2022 16  4 - 60 U/L Final    Specimen UA 11/23/2022 Urine, Clean Catch   Final    Color, UA 11/23/2022 Yellow  Yellow, Straw, Hoda Final    Appearance, UA 11/23/2022 Clear  Clear Final    pH, UA 11/23/2022 7.0  5.0 - 8.0 Final    Specific Gravity, UA 11/23/2022 1.015  1.005 - 1.030 Final    Protein, UA 11/23/2022 Negative  Negative Final    Glucose, UA 11/23/2022 4+ (A)  Negative Final    Ketones, UA 11/23/2022 Negative  Negative Final    Bilirubin (UA) 11/23/2022 Negative  Negative Final    Occult Blood UA 11/23/2022 Negative  Negative Final    Nitrite, UA 11/23/2022 Negative  Negative Final    Urobilinogen, UA 11/23/2022 1.0  <2.0 EU/dL Final    Leukocytes, UA 11/23/2022 Negative  Negative Final    RBC, UA 11/23/2022 1  0 - 4 /hpf Final    WBC, UA 11/23/2022 2  0 - 5 /hpf Final    Bacteria 11/23/2022 None  None-Occ /hpf Final    Yeast, UA 11/23/2022 None  None Final    Squam Epithel, UA 11/23/2022  0  /hpf Final    Microscopic Comment 11/23/2022 SEE COMMENT   Final   Admission on 11/04/2022, Discharged on 11/04/2022   Component Date Value Ref Range Status    POCT Glucose 11/04/2022 102  70 - 110 mg/dL Final       Past Medical History:   Diagnosis Date    Allergy     Ankle pain     left    Anticoagulant long-term use     aspirin    Anxiety     Atrial fibrillation     Back pain     Bruises easily     Cataract     Clotting disorder     left leg    Colon polyp     Diabetes mellitus     Diabetes mellitus, type 2     Hay fever     Hyperlipidemia     Hypertension     Left hip pain 12/26/2021    Seeing Dr. Beard for hip pain     Obese     BRANDI on CPAP      Past Surgical History:   Procedure Laterality Date    ABDOMINAL SURGERY      origunal surg 1986-mult surgeries since    CARPAL TUNNEL RELEASE      right wrist 2009-left wrist 2010    COLON SURGERY      partial colon removal    COLONOSCOPY  11/26/2018    Dr. Salazar; normal terminal ileum; polyps removed from ascending colon and hepatic flexure; pan diverticulosis; small internal hemorrhoids; repeat in 5 years; Bx: fragments of an adenomatous polyp with mild surface glandular dysplasia and associated chronic active inflammation, no evidence of high-grade dysplasia or malignancy    COLOSTOMY  1986    colostomy reversal  1986    EYE SURGERY      hay fever      HERNIA REPAIR  1949    REPAIR OF TENDON OF LOWER EXTREMITY Left 6/24/2020    Procedure: REPAIR, TENDON, LOWER EXTREMITY;  Surgeon: Justin Mandujano DPM;  Location: [facility] OR;  Service: Podiatry;  Laterality: Left;  ARTHEX NOTIFIED IN CASE HE WANTS ANCHOR    TOE SURGERY Left 2017    replaced a joint     TONSILLECTOMY  1947    TRANSFORAMINAL EPIDURAL INJECTION OF STEROID      x 4    TRANSFORAMINAL EPIDURAL INJECTION OF STEROID Bilateral 11/13/2019    Procedure: Injection,steroid,epidural,transforaminal approach;  Surgeon: Grant Wright MD;  Location: [facility] OR;  Service: Pain Management;  Laterality: Bilateral;   L4-5, L5-S1    TRANSFORAMINAL EPIDURAL INJECTION OF STEROID Bilateral 3/9/2021    Procedure: Injection,steroid,epidural,transforaminal approach;  Surgeon: Grant Wright MD;  Location: [facility] OR;  Service: Pain Management;  Laterality: Bilateral;  L4-5, L5-S1    TRANSFORAMINAL EPIDURAL INJECTION OF STEROID Bilateral 5/25/2021    Procedure: Injection,steroid,epidural,transforaminal approach;  Surgeon: Grant Wright MD;  Location: [facility] OR;  Service: Pain Management;  Laterality: Bilateral;  L4-L5,L5,S1    TRANSFORAMINAL EPIDURAL INJECTION OF STEROID Bilateral 12/14/2021    Procedure: Injection,steroid,epidural,transforaminal approach Bilateral L4-5, L5-S1;  Surgeon: Grant Wright MD;  Location: [facility] OR;  Service: Pain Management;  Laterality: Bilateral;    TRANSFORAMINAL EPIDURAL INJECTION OF STEROID Bilateral 11/4/2022    Procedure: Injection,steroid,epidural,transforaminal approach;  Surgeon: Grant Wright MD;  Location: [facility] OR;  Service: Pain Management;  Laterality: Bilateral;  L4-5 and L5-s1    TRANSFORAMINAL EPIDURAL INJECTION OF STEROID Bilateral 1/20/2023    Procedure: Injection,steroid,epidural,transforaminal approach L4-L5-S1;  Surgeon: Grant Wright MD;  Location: [facility] OR;  Service: Pain Management;  Laterality: Bilateral;     Family History   Problem Relation Age of Onset    Heart disease Mother     Melanoma Neg Hx     Psoriasis Neg Hx     Lupus Neg Hx     Eczema Neg Hx        Marital Status:   Alcohol History:  reports current alcohol use.  Tobacco History:  reports that he quit smoking about 16 years ago. His smoking use included cigarettes. He has never used smokeless tobacco.  Drug History:  reports no history of drug use.    Review of patient's allergies indicates:   Allergen Reactions    Ativan [lorazepam] Other (See Comments)     Mood alternating      Dilaudid [hydromorphone (bulk)] Other (See Comments)     Mood alternating      Morphine Other (See Comments)     Mood alternating      Adhesive  tape-silicones     Hydromorphone        Current Outpatient Medications:     apixaban (ELIQUIS) 5 mg Tab, Take 1 tablet (5 mg total) by mouth 2 (two) times daily., Disp: 180 tablet, Rfl: 3    aspirin 81 MG Chew, Take 1 tablet (81 mg total) by mouth once daily., Disp: 100 tablet, Rfl: 1    azelastine (ASTELIN) 137 mcg (0.1 %) nasal spray, 2 sprays (274 mcg total) by Nasal route 2 (two) times daily., Disp: 30 mL, Rfl: 5    blood sugar diagnostic (BLOOD GLUCOSE TEST) Strp, 1 strip by Misc.(Non-Drug; Combo Route) route 2 (two) times a day. FREESTYLE LITE BG TEST STRIPS. current use of insulin  - Primary ICD-10-CM: E11.9, Disp: 200 each, Rfl: 2    diclofenac sodium (VOLTAREN) 1 % Gel, Apply 2 g topically once daily., Disp: , Rfl:     fexofenadine (ALLEGRA) 180 MG tablet, Take 1 tablet (180 mg total) by mouth once daily., Disp: 90 tablet, Rfl: 3    FREESTYLE LANCETS 28 gauge lancets, Inject 1 lancet into the skin 3 (three) times daily., Disp: 100 each, Rfl: 3    furosemide (LASIX) 20 MG tablet, Take 1 tablet (20 mg total) by mouth every Mon, Wed, Fri., Disp: 45 tablet, Rfl: 3    gabapentin (NEURONTIN) 300 MG capsule, Take 1 capsule (300 mg total) by mouth 3 (three) times daily., Disp: 270 capsule, Rfl: 1    JARDIANCE 10 mg tablet, Take 1 tablet (10 mg total) by mouth once daily., Disp: 90 tablet, Rfl: 1    ketoconazole (NIZORAL) 2 % cream, Apply to face after shaving (Patient taking differently: Apply 1 application topically once daily. Apply to face after shaving), Disp: 60 g, Rfl: 2    lisinopriL (PRINIVIL,ZESTRIL) 20 MG tablet, Take 1 tablet (20 mg total) by mouth once daily., Disp: 90 tablet, Rfl: 3    metoprolol tartrate (LOPRESSOR) 25 MG tablet, Take 0.5 tablets (12.5 mg total) by mouth 2 (two) times daily., Disp: 180 tablet, Rfl: 3    montelukast (SINGULAIR) 10 mg tablet, Take 1 tablet (10 mg total) by mouth every evening., Disp: 90 tablet, Rfl: 3    polyethylene glycol (GLYCOLAX) 17 gram/dose powder, Take 17 g by  "mouth once daily., Disp: 507 g, Rfl: 3    potassium chloride (MICRO-K) 10 MEQ CpSR, Take 1 capsule (10 mEq total) by mouth every other day., Disp: 90 capsule, Rfl: 1    SITagliptin phosphate (JANUVIA) 100 MG Tab, Take 1 tablet (100 mg total) by mouth once daily., Disp: 90 tablet, Rfl: 3    traZODone (DESYREL) 100 MG tablet, Take 1 tablet (100 mg total) by mouth every evening., Disp: 90 tablet, Rfl: 3    triamcinolone acetonide 0.1% (KENALOG) 0.1 % cream, Apply topically 2 (two) times daily. (Patient taking differently: Apply 1 g topically 2 (two) times daily.), Disp: 80 g, Rfl: 0    cholecalciferol, vitamin D3, 1,250 mcg (50,000 unit) capsule, Take 50,000 Units by mouth every Sunday., Disp: , Rfl:     HYDROcodone-acetaminophen (NORCO)  mg per tablet, Take 1 tablet by mouth every 6 (six) hours as needed for Pain., Disp: 45 tablet, Rfl: 0  No current facility-administered medications for this visit.    Facility-Administered Medications Ordered in Other Visits:     lactated ringers infusion, , Intravenous, Once PRN, Grant Wright MD, Stopped at 11/04/22 1230    Review of Systems   Constitutional:  Negative for activity change, appetite change, diaphoresis, fatigue, fever and unexpected weight change.   HENT:  Negative for congestion, rhinorrhea, sinus pressure and sinus pain.    Gastrointestinal:  Negative for abdominal distention, abdominal pain, anal bleeding, blood in stool, constipation and diarrhea.      Objective:      Vitals:    02/14/23 0915   BP: 127/64   Pulse: 60   SpO2: 95%   Weight: 104 kg (229 lb 3.2 oz)   Height: 5' 8" (1.727 m)     Physical Exam  Vitals reviewed.   Constitutional:       General: He is not in acute distress.     Appearance: Normal appearance. He is obese. He is not ill-appearing or toxic-appearing.   HENT:      Head: Normocephalic and atraumatic.   Cardiovascular:      Rate and Rhythm: Normal rate and regular rhythm.   Abdominal:      Palpations: Abdomen is soft.      Tenderness: " There is no abdominal tenderness. There is no guarding or rebound.   Skin:     General: Skin is warm and dry.      Capillary Refill: Capillary refill takes less than 2 seconds.   Neurological:      Mental Status: He is alert and oriented to person, place, and time.       Assessment:       1. Hospital discharge follow-up    2. Lumbar radiculitis         Plan:       Hospital discharge follow-up  Patient doing well his bowel movements are almost returned to normal there on the soft side he is not complaining of any abdominal pain no nausea no vomiting, he is passing gas.  Lumbar radiculitis  -     HYDROcodone-acetaminophen (NORCO)  mg per tablet; Take 1 tablet by mouth every 6 (six) hours as needed for Pain.  Dispense: 45 tablet; Refill: 0      No follow-ups on file.           MED/SURG

## 2023-05-01 NOTE — ED PROVIDER NOTE - OBJECTIVE STATEMENT
[Follow-Up: _____] : a [unfilled] follow-up visit
63 y/o female with hx of HTN, HLD, inguinal and umbilical hernia presents to the ED c/o abd pain, RLQ, starting today.  Pt has hernias, however doesn't usually experience pain from hernia.  Pt notes that pain began today after eating two shrimp for lunch with associated symptoms of: + N/V +Back pain +Chills +lightheadedness. Pt denies SOB, fever, urinary dysfunction, diarrhea.  Pt's surgeon chose not to operate on her hernias due to her cirrhosis. Dr. Elmore, [facility] - surgeon.

## 2023-11-07 NOTE — ED PROVIDER NOTE - NSICDXFAMILYHX_GEN_ALL_CORE_FT
FAMILY HISTORY:  Father  Still living? Unknown  FH: diabetes mellitus, Age at diagnosis: Age Unknown     34.2

## 2024-06-30 NOTE — ED ADULT NURSE NOTE - CAS EDP DISCH DISPOSITION ADMI
HPI: 71 yo female with hx of learning disability, JUSTINO on CKD, DM II, HTN, A-fib s/p DCCV with h/o poor response to medications, also has ovarian and uterine CA, recent diagnosis of Splenic CA, CT Lumbar shows moderate stenosis and DDD, recent UTI, who presents as a code stroke due to sudden onset worsening of left sided weakness of LLE and LUE during PT also associated with slurred speech which has sinced resolved. Arrived with NIHSS of 8 with unknown LKW.  History taken from EMR, ED MD and pt sister at bedside.  (20 Jun 2024 21:01)      Cardiac PET ST (March 2024) no ischemia, LVEF 45%  echo (6/21/2024) LVEF 70%, mildly dilated LA, mod pul HTN    6/24/24:  pt is resting in the bed, denies chest pain/SOB/palpitations  remains Lt side weakness  tele: Afib 's, occasional upto 130's bpm    6/25/24: pt seen resting in bed in NAD, she denies any SOB, CP, dizziness or palpitations.  Left side weakness, can only move left hand  TELE: Afib  bpm, occasional RVRs to 130's    6/26/24: TELE: AFib  bpm, RVRs to 120-130's.  Cardizem gtt is at 5mg/hr.  She denies symptoms.    6/28/24: pt is resting in the bed, denies chest pain/SOB/palpitations  Tele: AFib 100-130's bpm    6/29/24 : Pt in bed without new symptoms remains tachycardic at rest.   Tele: Afib RVR 90's to 130's   6/29/24 : Pt in bed remains tachycardic at rest. Patient with bleeding from prior IV site in arm in setting of persistently supratherapeutic INR on heparin drip for her afib that has been difficult to control.    Tele: Afib RVR 90's to 130's     MEDICATIONS  (STANDING):  aMIOdarone    Tablet 200 milliGRAM(s) Oral daily  atorvastatin 80 milliGRAM(s) Oral at bedtime  clopidogrel Tablet 75 milliGRAM(s) Oral daily  diltiazem    Tablet 90 milliGRAM(s) Oral every 6 hours  ezetimibe 10 milliGRAM(s) Oral daily  heparin  Infusion.  Unit(s)/Hr (15 mL/Hr) IV Continuous <Continuous>    MEDICATIONS  (PRN):  acetaminophen     Tablet .. 650 milliGRAM(s) Oral every 6 hours PRN Mild Pain (1 - 3)  heparin   Injectable 3000 Unit(s) IV Push every 6 hours PRN For aPTT between 40 - 57  heparin   Injectable 6500 Unit(s) IV Push every 6 hours PRN For aPTT less than 40  metoprolol tartrate Injectable 5 milliGRAM(s) IV Push every 6 hours PRN HR > 130  ondansetron Injectable 4 milliGRAM(s) IV Push every 6 hours PRN Nausea and/or Vomiting        Physical Exam:      Vital Signs Last 24 Hrs  T(C): 36.6 (30 Jun 2024 08:33), Max: 36.6 (29 Jun 2024 23:21)  T(F): 97.8 (30 Jun 2024 08:33), Max: 97.9 (29 Jun 2024 23:21)  HR: 113 (30 Jun 2024 08:33) (82 - 145)  BP: 138/83 (30 Jun 2024 08:33) (108/91 - 157/77)  BP(mean): 80 (29 Jun 2024 23:21) (80 - 80)  RR: 18 (30 Jun 2024 08:33) (18 - 18)  SpO2: 97% (30 Jun 2024 08:33) (94% - 97%)    Parameters below as of 30 Jun 2024 08:33  Patient On (Oxygen Delivery Method): room air              Constitutional: well developed,  no deformities and no acute distress  Neurological: Alert & Oriented x 3,(+) Lt side hemiparesis  HEENT: NC/AT, PERRLA, EOMI,  Neck supple.  Respiratory: CTA B/L, No wheezing/crackles/rhonchi  Cardiovascular: (+) irregular S1 & S2,   Gastrointestinal: soft, NT, nondistended, (+) BS  Genitourinary: non distended bladder, voiding freely  Extremities: No pedal edema, No clubbing, No cyanosis      Allergies    Bee stings (Swelling)  No Known Drug Allergies    LABS:                        7.0    13.04 )-----------( 398      ( 30 Jun 2024 10:13 )             21.0     30 Jun 2024 10:13    136    |  108    |  39     ----------------------------<  184    4.8     |  22     |  2.29     Ca    8.7        30 Jun 2024 10:13      PTT - ( 30 Jun 2024 10:13 )  PTT:51.2 sec     [facility]

## 2024-08-29 NOTE — ED PROVIDER NOTE - CPE EDP SKIN NORM
Attending and PA/NP shared services statement (NON-critical care): Attending and PA/NP shared services statement (NON-critical care): normal...

## 2025-05-01 NOTE — ED PROVIDER NOTE - EKG ADDITIONAL QUESTION - PERFORMED INDEPENDENT VISUALIZATION
Caro Butterfield is here today for Office Visit and Follow-up    Concerns/symptoms: N/A  Medications: medications verified and updated  Refills needed today? No     Tobacco history: verified  Advanced Directives: No not on file, not interested.  BP greater than 140/90? No    Patient would like communication of their results via:    Cell Phone:   Telephone Information:   Mobile [phone]     Okay to leave a message containing results? Yes  Preferred language:  English.       Health Maintenance       Shingles Vaccine (2 of 2)  Overdue since 3/22/2023    Lung Cancer Screening (Yearly)  Overdue since 2/21/2024    COVID-19 Vaccine (7 - 2024-25 season)  Overdue since 9/1/2024    Respiratory Syncytial Virus (RSV) Vaccine 60+ (1 - Risk 60-74 years 1-dose series)  Never done           Following review of the above:  Patient wishes to discuss with clinician: Lung Cancer Screening, COVID-19, Respiratory Syncytial Virus (RSV), and Shingles    Note: Refer to final orders and clinician documentation.           Medicare HRA:                PHQ 2:  PHQ 2 Score Adult PHQ 2 Score Adult PHQ 2 Interpretation Little interest or pleasure in activity?   2/17/2025  12:06 PM 1 No further screening needed 1       PHQ 9:  PHQ 9 Score Adult PHQ 9 Score Adult PHQ 9 Interpretation   2/17/2025  12:06 PM 3 Minimal Depression      Yes